# Patient Record
Sex: FEMALE | Race: WHITE | Employment: OTHER | URBAN - METROPOLITAN AREA
[De-identification: names, ages, dates, MRNs, and addresses within clinical notes are randomized per-mention and may not be internally consistent; named-entity substitution may affect disease eponyms.]

---

## 2018-07-17 ENCOUNTER — HOSPITAL ENCOUNTER (OUTPATIENT)
Age: 68
Discharge: HOME | End: 2018-07-19
Payer: MEDICARE

## 2018-07-17 ENCOUNTER — HOSPITAL ENCOUNTER (OUTPATIENT)
Dept: GENERAL RADIOLOGY | Age: 68
Discharge: HOME | End: 2018-07-19
Payer: MEDICARE

## 2018-07-17 DIAGNOSIS — M25.562 LEFT KNEE PAIN, UNSPECIFIED CHRONICITY: ICD-10-CM

## 2018-07-17 DIAGNOSIS — M25.572 LEFT ANKLE PAIN, UNSPECIFIED CHRONICITY: ICD-10-CM

## 2018-07-17 PROCEDURE — 73610 X-RAY EXAM OF ANKLE: CPT

## 2018-07-17 PROCEDURE — 73560 X-RAY EXAM OF KNEE 1 OR 2: CPT

## 2018-07-20 ENCOUNTER — TELEPHONE (OUTPATIENT)
Dept: VASCULAR SURGERY | Age: 68
End: 2018-07-20

## 2018-09-18 ENCOUNTER — HOSPITAL ENCOUNTER (OUTPATIENT)
Dept: NUCLEAR MEDICINE | Age: 68
Discharge: HOME | End: 2018-09-18
Payer: MEDICARE

## 2018-09-18 DIAGNOSIS — M17.12 OSTEOARTHRITIS OF LEFT KNEE, UNSPECIFIED OSTEOARTHRITIS TYPE: ICD-10-CM

## 2018-09-18 PROCEDURE — 78300 BONE IMAGING LIMITED AREA: CPT

## 2018-09-18 PROCEDURE — 3430000000 HC RX DIAGNOSTIC RADIOPHARMACEUTICAL: Performed by: RADIOLOGY

## 2018-09-18 PROCEDURE — A9503 TC99M MEDRONATE: HCPCS | Performed by: RADIOLOGY

## 2018-09-18 RX ORDER — TC 99M MEDRONATE 20 MG/10ML
25 INJECTION, POWDER, LYOPHILIZED, FOR SOLUTION INTRAVENOUS
Status: COMPLETED | OUTPATIENT
Start: 2018-09-18 | End: 2018-09-18

## 2018-09-18 RX ADMIN — Medication 25 MILLICURIE: at 12:07

## 2018-09-24 ENCOUNTER — HOSPITAL ENCOUNTER (OUTPATIENT)
Dept: NUCLEAR MEDICINE | Age: 68
Discharge: HOME | End: 2018-09-24
Payer: MEDICARE

## 2018-09-24 DIAGNOSIS — T84.9XXA COMPLICATIONS DUE TO INTERNAL ORTHOPEDIC DEVICE, IMPLANT, AND GRAFT (HCC): ICD-10-CM

## 2018-09-24 PROCEDURE — 78805 NM INFLAMMATORY WBC LIMITED W CERETEC: CPT

## 2018-09-24 PROCEDURE — A9569 TECHNETIUM TC-99M AUTO WBC: HCPCS | Performed by: RADIOLOGY

## 2018-09-24 PROCEDURE — 3430000000 HC RX DIAGNOSTIC RADIOPHARMACEUTICAL: Performed by: RADIOLOGY

## 2018-09-24 RX ADMIN — EXAMETAZIME 20 MILLICURIE: KIT at 12:10

## 2018-09-26 ENCOUNTER — HOSPITAL ENCOUNTER (OUTPATIENT)
Dept: NUCLEAR MEDICINE | Age: 68
Discharge: HOME | End: 2018-09-26
Payer: MEDICARE

## 2018-09-26 DIAGNOSIS — T84.033A MECHANICAL LOOSENING OF INTERNAL LEFT KNEE PROSTHETIC JOINT, INITIAL ENCOUNTER (HCC): ICD-10-CM

## 2018-09-26 PROCEDURE — A9541 TC99M SULFUR COLLOID: HCPCS | Performed by: RADIOLOGY

## 2018-09-26 PROCEDURE — 3430000000 HC RX DIAGNOSTIC RADIOPHARMACEUTICAL: Performed by: RADIOLOGY

## 2018-09-26 PROCEDURE — 78102 BONE MARROW IMAGING LTD: CPT

## 2018-09-26 RX ADMIN — Medication 15 MILLICURIE: at 13:00

## 2018-10-30 ENCOUNTER — HOSPITAL ENCOUNTER (OUTPATIENT)
Age: 68
Discharge: HOME | End: 2018-11-01

## 2018-10-31 PROCEDURE — 88305 TISSUE EXAM BY PATHOLOGIST: CPT

## 2019-06-04 ENCOUNTER — TELEPHONE (OUTPATIENT)
Dept: ADMINISTRATIVE | Age: 69
End: 2019-06-04

## 2019-06-04 ENCOUNTER — OFFICE VISIT (OUTPATIENT)
Dept: PRIMARY CARE CLINIC | Age: 69
End: 2019-06-04
Payer: MEDICARE

## 2019-06-04 VITALS
BODY MASS INDEX: 42.32 KG/M2 | HEIGHT: 65 IN | WEIGHT: 254 LBS | DIASTOLIC BLOOD PRESSURE: 83 MMHG | SYSTOLIC BLOOD PRESSURE: 138 MMHG | TEMPERATURE: 98.1 F

## 2019-06-04 DIAGNOSIS — M17.12 PRIMARY OSTEOARTHRITIS OF LEFT KNEE: ICD-10-CM

## 2019-06-04 DIAGNOSIS — E03.9 ACQUIRED HYPOTHYROIDISM: ICD-10-CM

## 2019-06-04 DIAGNOSIS — I10 ESSENTIAL HYPERTENSION: Primary | ICD-10-CM

## 2019-06-04 DIAGNOSIS — E78.2 MIXED HYPERLIPIDEMIA: Primary | ICD-10-CM

## 2019-06-04 DIAGNOSIS — E78.2 MIXED HYPERLIPIDEMIA: ICD-10-CM

## 2019-06-04 DIAGNOSIS — K76.0 FATTY LIVER: ICD-10-CM

## 2019-06-04 DIAGNOSIS — I10 ESSENTIAL HYPERTENSION: ICD-10-CM

## 2019-06-04 DIAGNOSIS — E11.9 DIET-CONTROLLED DIABETES MELLITUS (HCC): ICD-10-CM

## 2019-06-04 DIAGNOSIS — R94.31 EKG ABNORMALITIES: ICD-10-CM

## 2019-06-04 DIAGNOSIS — E55.9 VITAMIN D DEFICIENCY: ICD-10-CM

## 2019-06-04 PROCEDURE — 99214 OFFICE O/P EST MOD 30 MIN: CPT | Performed by: FAMILY MEDICINE

## 2019-06-04 PROCEDURE — 93000 ELECTROCARDIOGRAM COMPLETE: CPT | Performed by: FAMILY MEDICINE

## 2019-06-04 ASSESSMENT — ENCOUNTER SYMPTOMS
RESPIRATORY NEGATIVE: 1
EYES NEGATIVE: 1
GASTROINTESTINAL NEGATIVE: 1
ALLERGIC/IMMUNOLOGIC NEGATIVE: 1

## 2019-06-04 NOTE — TELEPHONE ENCOUNTER
URGENT referral per Dr. Waylon Miller from today dx: \"PRE OP EKG ABNORMAL, OR PLANNED ON LEFT TOTAL [place]" on 7/10/19 by Dr. Yuridia Hyatt. Previous 2015 [place] office pt. [facility] has an opening 7:40 Monday if ok with office).

## 2019-06-04 NOTE — TELEPHONE ENCOUNTER
URGENT NP scheduled with Dr. Lakesha Dumont for: 6/10/19. Prior 2012 [place] pt. Cardiology Hx sheet scanned.

## 2019-06-04 NOTE — PROGRESS NOTES
19  Name: Wero Ervin    : 1950    Sex: female    Age: 77 y.o. Subjective:  Chief Complaint: Patient is here for knee     For surgery left knee  7-10  With dr Alex Meza    For revision due to loose hardware    Feel olinda  No  Cp or sob            Review of Systems   Constitutional: Negative. HENT: Negative. Eyes: Negative. Respiratory: Negative. Cardiovascular: Negative. Gastrointestinal: Negative. Endocrine: Negative. Genitourinary: Negative. Musculoskeletal: Positive for arthralgias and gait problem. Skin: Negative. Allergic/Immunologic: Negative. Hematological: Negative. Psychiatric/Behavioral: Negative. Current Outpatient Medications:     ibuprofen (ADVIL;MOTRIN) 600 MG tablet, Take 1 tablet by mouth every 8 hours as needed for Pain, Disp: 15 tablet, Rfl: 0    vitamin D (CHOLECALCIFEROL) 5000 UNITS CAPS capsule, Take 5,000 Units by mouth daily, Disp: , Rfl:     vitamin E 400 UNIT capsule, Take 400 Units by mouth daily. , Disp: , Rfl:     cyclobenzaprine (FLEXERIL) 10 MG tablet, Take 10 mg by mouth 3 times daily as needed. , Disp: , Rfl:     simvastatin (ZOCOR) 40 MG tablet, Take 40 mg by mouth. 3 x per week  W  , Disp: , Rfl:     citalopram (CELEXA) 20 MG tablet, Take 20 mg by mouth daily. , Disp: , Rfl:     MULTIPLE VITAMIN PO, Take 1 tablet by mouth daily. , Disp: , Rfl:     Coenzyme Q10 (CO Q 10 PO), Take 1 tablet by mouth daily.   , Disp: , Rfl:   No Known Allergies  Social History     Socioeconomic History    Marital status:      Spouse name: Not on file    Number of children: Not on file    Years of education: Not on file    Highest education level: Not on file   Occupational History    Not on file   Social Needs    Financial resource strain: Not on file    Food insecurity:     Worry: Not on file     Inability: Not on file    Transportation needs:     Medical: Not on file     Non-medical: Not on file   Tobacco 9-17---REFERRED BACK TO DR Amarilys Cox    NON HEALING LEFT DISTAL FIBULA AVULSION FX 7-18    L KNEE PAIN DR Tyrese De La Vega SENT TO DR SHORT 1-19 AND TOLD NEEDS REPLACEMENT---PT PLANS JUNE 2019      Past Medical History:   Diagnosis Date    Abnormal nuclear stress test 9/17/2012    Anxiety     Hyperlipidemia     Osteoarthritis     Palpitations 9/17/2012     Family History   Problem Relation Age of Onset    Lung Cancer Father     Thyroid Disease Sister       Past Surgical History:   Procedure Laterality Date    BREAST SURGERY  1990    cyst lt breast    COLONOSCOPY  5/2012    HYSTERECTOMY  1980    JOINT REPLACEMENT  2001    L knee    JOINT REPLACEMENT  2001    rt knee      Vitals:    06/04/19 0732   BP: 138/83   Temp: 98.1 °F (36.7 °C)   Weight: 254 lb (115.2 kg)   Height: 5' 5\" (1.651 m)       Objective:    Physical Exam   Constitutional: She is oriented to person, place, and time. She appears well-developed and well-nourished. HENT:   Head: Normocephalic. Eyes: Pupils are equal, round, and reactive to light. EOM are normal.   Neck: Normal range of motion. Cardiovascular: Normal rate and regular rhythm. Pulmonary/Chest: Effort normal and breath sounds normal.   Abdominal: Soft. Musculoskeletal: She exhibits tenderness and deformity (tender with palp left med knee). Neurological: She is alert and oriented to person, place, and time. Skin: Skin is warm. Psychiatric: She has a normal mood and affect. Her behavior is normal.   Vitals reviewed. Alanis Cruz was seen today for other. Diagnoses and all orders for this visit:    Essential hypertension  -     EKG 12 Lead; Standing  -     EKG 12 Lead  -     EKG 12 lead;  Future  -     EKG 12 lead  -     [address] [facility]    Primary osteoarthritis of left knee    Mixed hyperlipidemia    Fatty liver    EKG abnormalities  -     [address] [facility]        Comments: ekg    With some abnormality   Will arrangge cardio clearance before surgeyr HM issues  Diet  exer  Low sugar  Diet   A great deal of time spent reviewing medications, diet, exercise, social issues. Also reviewing the chart before entering the room with patient and finishing charting after leaving patient's room. More than half of that time was spent face to face with the patient in counseling and coordinating care.       Follow Up: Return reg---see referral.     Seen by:  Trey Devine DO

## 2019-06-06 NOTE — PROGRESS NOTES
[facility]  [facility]. Consuelo Caro M.D. Ami Johnson M.D.  Demetrius Cortez. Anthony Reddy M.D. Parry Rosebush, Karie Schlatter, M.D. Vishal Wesley M.D. Dot Divine   1950  [place] Emily Gallegos DO     This 59-year-old woman is assessed as an outpatient this morning. She has a history of knee replacement surgery. She reports that she has been scheduled for left knee replacement by Dr. Sheryle Bogus 07/10/2019. She is increasingly sedentary because of knee pain. Historically, she is unable to climb a flight of stairs without dyspnea. She currently is not having any chest pain. She is occasional dependent ankle edema. She denies her orthopnea or sweats. She had previous cardiac evaluation in 2012 and a stress perfusion study was mildly abnormal then. Medical History:  1. Non-smoker  2. Anxiety. 3. Hyperlipidemia. 4. Osteoarthritis. Obesity. BMI 42.77 - 06/2019  Surgical history: Hysterectomy, breast biopsy, bilateral knee replacement  Echo 05/01/2012: Normal EF. No dilatation. Borderline LVH. No valve abnormality  Treadmill MPS 04/23/2012: 4.6 METS. EKG negative. Small apical reversible defect. EF 73%. No 3 times daily low to intermediate risk. Review of Systems:  Constitutional: negative for fever and chills  Respiratory: negative for cough and hemoptysis  Cardiovascular:   Gastrointestinal: negative for abdominal pain, diarrhea, nausea and vomiting  Genitourinary:negative for dysuria and hematuria  Derm: negative for rash and skin lesion(s)  Neurological: negative for seizures and tremors  Endocrine: negative for diabetic symptoms including polydipsia and polyuria  Musculoskeletal: negative for CTD  Psychiatric: negative for psychosis and major depression    On examination, she is an obese, pleasant, alert, middle-aged,  female. Skin is warm and dry. Respirations are unlabored.   /80   Pulse 76   Resp 16   Ht 5' 5\" (1.651 m)   Wt 257 lb (116.6 kg)   BMI 42.77 kg/m² . HEENT negative for scleral icterus. Extraocular muscles intact. No facial asymmetry or central cyanosis. Neck without masses or goiter. No bruit or JVD. Cardiac apex not displaced. Rhythm regular. Abdomen normal.  Extremities without edema. Lungs are clear. EKG today shows normal sinus rhythm. Poor anterior R wave progression. Low QRS voltage. The patient has coronary artery risk factors and a previously abnormal stress perfusion study. She is sedentary and therefore surgical risk is difficult to assess. A Lexiscan stress perfusion study is scheduled. The issues were reviewed with the patient. The importance of weight loss and exercise program were also reviewed. Further recommendations will follow the perfusion study. At completion of today's visit, medications include the following:    Current Outpatient Medications:     buPROPion (WELLBUTRIN XL) 300 MG extended release tablet, Take 300 mg by mouth every morning, Disp: , Rfl:     aspirin-acetaminophen-caffeine (EXCEDRIN MIGRAINE) 250-250-65 MG per tablet, Take 1 tablet by mouth every 6 hours as needed for Headaches, Disp: , Rfl:     ibuprofen (ADVIL;MOTRIN) 600 MG tablet, Take 1 tablet by mouth every 8 hours as needed for Pain, Disp: 15 tablet, Rfl: 0    vitamin D (CHOLECALCIFEROL) 5000 UNITS CAPS capsule, Take 5,000 Units by mouth daily, Disp: , Rfl:     vitamin E 400 UNIT capsule, Take 400 Units by mouth daily. , Disp: , Rfl:     simvastatin (ZOCOR) 40 MG tablet, Take 40 mg by mouth Indications: pt. is taking 4times a week 3 x per week M W F , Disp: , Rfl:     MULTIPLE VITAMIN PO, Take 1 tablet by mouth daily. , Disp: , Rfl:     Coenzyme Q10 (CO Q 10 PO), Take 1 tablet by mouth daily. , Disp: , Rfl:       Riley Harrison MD     Cc Dr Gus Shannon

## 2019-06-10 ENCOUNTER — OFFICE VISIT (OUTPATIENT)
Dept: CARDIOLOGY CLINIC | Age: 69
End: 2019-06-10
Payer: MEDICARE

## 2019-06-10 VITALS
HEART RATE: 76 BPM | SYSTOLIC BLOOD PRESSURE: 130 MMHG | BODY MASS INDEX: 42.82 KG/M2 | DIASTOLIC BLOOD PRESSURE: 80 MMHG | WEIGHT: 257 LBS | HEIGHT: 65 IN | RESPIRATION RATE: 16 BRPM

## 2019-06-10 DIAGNOSIS — I10 ESSENTIAL HYPERTENSION: Primary | ICD-10-CM

## 2019-06-10 DIAGNOSIS — Z01.818 PRE-OP EXAMINATION: ICD-10-CM

## 2019-06-10 DIAGNOSIS — R06.02 SHORTNESS OF BREATH: ICD-10-CM

## 2019-06-10 PROCEDURE — 3017F COLORECTAL CA SCREEN DOC REV: CPT | Performed by: INTERNAL MEDICINE

## 2019-06-10 PROCEDURE — 1036F TOBACCO NON-USER: CPT | Performed by: INTERNAL MEDICINE

## 2019-06-10 PROCEDURE — 93000 ELECTROCARDIOGRAM COMPLETE: CPT | Performed by: INTERNAL MEDICINE

## 2019-06-10 PROCEDURE — 1123F ACP DISCUSS/DSCN MKR DOCD: CPT | Performed by: INTERNAL MEDICINE

## 2019-06-10 PROCEDURE — G8400 PT W/DXA NO RESULTS DOC: HCPCS | Performed by: INTERNAL MEDICINE

## 2019-06-10 PROCEDURE — 99214 OFFICE O/P EST MOD 30 MIN: CPT | Performed by: INTERNAL MEDICINE

## 2019-06-10 PROCEDURE — G8427 DOCREV CUR MEDS BY ELIG CLIN: HCPCS | Performed by: INTERNAL MEDICINE

## 2019-06-10 PROCEDURE — G8417 CALC BMI ABV UP PARAM F/U: HCPCS | Performed by: INTERNAL MEDICINE

## 2019-06-10 PROCEDURE — 1090F PRES/ABSN URINE INCON ASSESS: CPT | Performed by: INTERNAL MEDICINE

## 2019-06-10 PROCEDURE — 4040F PNEUMOC VAC/ADMIN/RCVD: CPT | Performed by: INTERNAL MEDICINE

## 2019-06-10 RX ORDER — ACETAMINOPHEN, ASPIRIN AND CAFFEINE 250; 250; 65 MG/1; MG/1; MG/1
1 TABLET, FILM COATED ORAL EVERY 6 HOURS PRN
Status: ON HOLD | COMMUNITY
End: 2021-02-22 | Stop reason: HOSPADM

## 2019-06-10 RX ORDER — BUPROPION HYDROCHLORIDE 300 MG/1
300 TABLET ORAL EVERY MORNING
COMMUNITY
End: 2019-07-19 | Stop reason: SDUPTHER

## 2019-06-11 ENCOUNTER — HOSPITAL ENCOUNTER (OUTPATIENT)
Age: 69
Discharge: HOME | End: 2019-06-13
Payer: MEDICARE

## 2019-06-11 ENCOUNTER — TELEPHONE (OUTPATIENT)
Dept: CARDIOLOGY | Age: 69
End: 2019-06-11

## 2019-06-11 LAB
C-REACTIVE PROTEIN: 1.2 MG/DL (ref 0–0.4)
HCT VFR BLD CALC: 45.8 % (ref 34–48)
HEMOGLOBIN: 14.9 G/DL (ref 11.5–15.5)
MCH RBC QN AUTO: 30.5 PG (ref 26–35)
MCHC RBC AUTO-ENTMCNC: 32.5 % (ref 32–34.5)
MCV RBC AUTO: 93.9 FL (ref 80–99.9)
PDW BLD-RTO: 12.8 FL (ref 11.5–15)
PLATELET # BLD: 270 E9/L (ref 130–450)
PMV BLD AUTO: 11.6 FL (ref 7–12)
RBC # BLD: 4.88 E12/L (ref 3.5–5.5)
SEDIMENTATION RATE, ERYTHROCYTE: 31 MM/HR (ref 0–20)
WBC # BLD: 7.6 E9/L (ref 4.5–11.5)

## 2019-06-11 PROCEDURE — 86140 C-REACTIVE PROTEIN: CPT

## 2019-06-11 PROCEDURE — 85027 COMPLETE CBC AUTOMATED: CPT

## 2019-06-11 PROCEDURE — 36415 COLL VENOUS BLD VENIPUNCTURE: CPT

## 2019-06-11 PROCEDURE — 85651 RBC SED RATE NONAUTOMATED: CPT

## 2019-06-11 NOTE — TELEPHONE ENCOUNTER
Called patient, left message on home answering machine reminding of stress appointment on 6/12 @ 0630 am

## 2019-06-12 ENCOUNTER — HOSPITAL ENCOUNTER (OUTPATIENT)
Dept: CARDIOLOGY | Age: 69
Discharge: HOME | End: 2019-06-12
Payer: MEDICARE

## 2019-06-12 VITALS
HEIGHT: 65 IN | BODY MASS INDEX: 42.82 KG/M2 | WEIGHT: 257 LBS | HEART RATE: 77 BPM | DIASTOLIC BLOOD PRESSURE: 70 MMHG | SYSTOLIC BLOOD PRESSURE: 132 MMHG

## 2019-06-12 DIAGNOSIS — Z01.818 PRE-OP EXAMINATION: ICD-10-CM

## 2019-06-12 DIAGNOSIS — R06.02 SHORTNESS OF BREATH: Primary | ICD-10-CM

## 2019-06-12 PROCEDURE — 6360000002 HC RX W HCPCS: Performed by: INTERNAL MEDICINE

## 2019-06-12 PROCEDURE — 3430000000 HC RX DIAGNOSTIC RADIOPHARMACEUTICAL: Performed by: INTERNAL MEDICINE

## 2019-06-12 PROCEDURE — 78452 HT MUSCLE IMAGE SPECT MULT: CPT

## 2019-06-12 PROCEDURE — 2580000003 HC RX 258: Performed by: INTERNAL MEDICINE

## 2019-06-12 PROCEDURE — A9500 TC99M SESTAMIBI: HCPCS | Performed by: INTERNAL MEDICINE

## 2019-06-12 PROCEDURE — 93017 CV STRESS TEST TRACING ONLY: CPT

## 2019-06-12 RX ORDER — SODIUM CHLORIDE 0.9 % (FLUSH) 0.9 %
10 SYRINGE (ML) INJECTION PRN
Status: DISCONTINUED | OUTPATIENT
Start: 2019-06-12 | End: 2019-06-13 | Stop reason: HOSPADM

## 2019-06-12 RX ADMIN — Medication 10 ML: at 08:16

## 2019-06-12 RX ADMIN — Medication 32.8 MILLICURIE: at 08:16

## 2019-06-12 RX ADMIN — Medication 10.7 MILLICURIE: at 06:48

## 2019-06-12 RX ADMIN — REGADENOSON 0.4 MG: 0.08 INJECTION, SOLUTION INTRAVENOUS at 08:16

## 2019-06-12 RX ADMIN — Medication 10 ML: at 08:17

## 2019-06-12 RX ADMIN — Medication 10 ML: at 06:48

## 2019-06-12 NOTE — PROCEDURES
[address] [facility] [address]  [phone]                Pharmacologic Stress Nuclear Gated SPECT Study    Name: Lia Account Number: [de-identified]    :  1950          Sex: female         Date of Study:  2019    Height: 5' 5\" (165.1 cm)         Weight: 257 lb (116.6 kg)     Ordering Provider: Alia Altamirano MD          PCP: Scott Moreno DO      Cardiologist: Alia Altamirano MD             Interpreting Physician: Alia Altamirano MD  _________________________________________________________________________________    Indication:   Detecting the presence and location of coronary artery disease,Preop clearance left knee replacement 7/10/2019    Clinical History:   Patient has no known history of coronary artery disease. Resting ECG:    AR int 0.22 sec, QRS int 0.08 sec, QT int  sec; HR 69 bpm  Normal sinus rhythm. EKG poor anterior R wave progression    Procedure:   Pharmacologic stress testing was performed with regadenoson 0.4 mg for 15 seconds. The heart rate was 69 at baseline and sandee to 112 beats during the infusion. This corresponds to 75% of maximum predicted heart rate. The blood pressure at baseline was 150/86 and blood pressure at the end of infusion was 132/70. Blood pressure response was normal during the stress procedure. The patient tolerated the infusion well. ECG during the infusion did not change. IMAGING: Myocardial perfusion imaging was performed at rest 30-35 minutes following the intravenous injection of 10.7 mCi of (Tc-Sestamibi) followed by 10 ml of Normal Saline. As per infusion protocol, the patient was injected intravenously with 32.8 mCi of (Tc-Sestamibi) followed by 10 ml of Normal Saline. Gated post-stress tomographic imaging was performed 20-25 minutes after stress. FINDINGS: The overall quality of the study was good.      Left

## 2019-06-13 ENCOUNTER — OFFICE VISIT (OUTPATIENT)
Dept: CARDIOLOGY CLINIC | Age: 69
End: 2019-06-13
Payer: MEDICARE

## 2019-06-13 VITALS
RESPIRATION RATE: 16 BRPM | WEIGHT: 254.6 LBS | HEART RATE: 80 BPM | SYSTOLIC BLOOD PRESSURE: 124 MMHG | BODY MASS INDEX: 42.42 KG/M2 | DIASTOLIC BLOOD PRESSURE: 80 MMHG | HEIGHT: 65 IN

## 2019-06-13 DIAGNOSIS — I25.10 CORONARY ARTERY DISEASE INVOLVING NATIVE CORONARY ARTERY OF NATIVE HEART WITHOUT ANGINA PECTORIS: ICD-10-CM

## 2019-06-13 DIAGNOSIS — R06.02 SHORTNESS OF BREATH: Primary | ICD-10-CM

## 2019-06-13 DIAGNOSIS — R07.9 CHEST PAIN, UNSPECIFIED TYPE: ICD-10-CM

## 2019-06-13 PROCEDURE — 1123F ACP DISCUSS/DSCN MKR DOCD: CPT | Performed by: INTERNAL MEDICINE

## 2019-06-13 PROCEDURE — G8427 DOCREV CUR MEDS BY ELIG CLIN: HCPCS | Performed by: INTERNAL MEDICINE

## 2019-06-13 PROCEDURE — 93000 ELECTROCARDIOGRAM COMPLETE: CPT | Performed by: INTERNAL MEDICINE

## 2019-06-13 PROCEDURE — G8417 CALC BMI ABV UP PARAM F/U: HCPCS | Performed by: INTERNAL MEDICINE

## 2019-06-13 PROCEDURE — 99213 OFFICE O/P EST LOW 20 MIN: CPT | Performed by: INTERNAL MEDICINE

## 2019-06-13 PROCEDURE — G8400 PT W/DXA NO RESULTS DOC: HCPCS | Performed by: INTERNAL MEDICINE

## 2019-06-13 PROCEDURE — 1090F PRES/ABSN URINE INCON ASSESS: CPT | Performed by: INTERNAL MEDICINE

## 2019-06-13 PROCEDURE — 3017F COLORECTAL CA SCREEN DOC REV: CPT | Performed by: INTERNAL MEDICINE

## 2019-06-13 PROCEDURE — 4040F PNEUMOC VAC/ADMIN/RCVD: CPT | Performed by: INTERNAL MEDICINE

## 2019-06-13 PROCEDURE — G8598 ASA/ANTIPLAT THER USED: HCPCS | Performed by: INTERNAL MEDICINE

## 2019-06-13 PROCEDURE — 1036F TOBACCO NON-USER: CPT | Performed by: INTERNAL MEDICINE

## 2019-06-14 NOTE — PROGRESS NOTES
Pulse 80   Resp 16   Ht 5' 5\" (1.651 m)   Wt 254 lb 9.6 oz (115.5 kg)   BMI 42.37 kg/m² . HEENT negative for scleral icterus. Extraocular muscles intact. No facial asymmetry or central cyanosis. Neck without masses or goiter. No bruit or JVD. Cardiac apex not displaced. Rhythm regular. Abdomen normal.  Extremities without edema. Lungs are clear. EKG normal sinus. Poor anterior R wave progression. Low QRS voltage. The findings were discussed with the patient at length. She is recommended to have a coronary angiogram given the high risk nature of the stress perfusion images. It was also discussed with her that the test could possibly represent a false positive and she acknowledges she understands this and wishes to resolve the issue angiographically. This will be scheduled at her earliest possible convenience. AUC: A (9). Indication: 17; score 9    At completion of today's visit, medications include the following:    Current Outpatient Medications:     buPROPion (WELLBUTRIN XL) 300 MG extended release tablet, Take 300 mg by mouth every morning, Disp: , Rfl:     aspirin-acetaminophen-caffeine (EXCEDRIN MIGRAINE) 250-250-65 MG per tablet, Take 1 tablet by mouth every 6 hours as needed for Headaches, Disp: , Rfl:     ibuprofen (ADVIL;MOTRIN) 600 MG tablet, Take 1 tablet by mouth every 8 hours as needed for Pain, Disp: 15 tablet, Rfl: 0    vitamin D (CHOLECALCIFEROL) 5000 UNITS CAPS capsule, Take 5,000 Units by mouth daily, Disp: , Rfl:     vitamin E 400 UNIT capsule, Take 400 Units by mouth daily. , Disp: , Rfl:     simvastatin (ZOCOR) 40 MG tablet, Take 40 mg by mouth Indications: pt. is taking 5times a week , Disp: , Rfl:     MULTIPLE VITAMIN PO, Take 1 tablet by mouth daily. , Disp: , Rfl:     Coenzyme Q10 (CO Q 10 PO), Take 1 tablet by mouth daily.   , Disp: , Rfl:       Silvia Mcintosh MD     CC Dr. Alva Azar

## 2019-06-18 ENCOUNTER — TELEPHONE (OUTPATIENT)
Dept: CARDIAC CATH/INVASIVE PROCEDURES | Age: 69
End: 2019-06-18

## 2019-06-19 ENCOUNTER — TELEPHONE (OUTPATIENT)
Dept: ADMINISTRATIVE | Age: 69
End: 2019-06-19

## 2019-06-19 ENCOUNTER — HOSPITAL ENCOUNTER (OUTPATIENT)
Dept: CARDIAC CATH/INVASIVE PROCEDURES | Age: 69
Discharge: HOME | End: 2019-06-19
Payer: MEDICARE

## 2019-06-19 VITALS — WEIGHT: 250 LBS | HEIGHT: 65 IN | BODY MASS INDEX: 41.65 KG/M2

## 2019-06-19 LAB
ABO/RH: NORMAL
ANION GAP SERPL CALCULATED.3IONS-SCNC: 10 MMOL/L (ref 7–16)
ANTIBODY SCREEN: NORMAL
BUN BLDV-MCNC: 13 MG/DL (ref 8–23)
CALCIUM SERPL-MCNC: 9.7 MG/DL (ref 8.6–10.2)
CHLORIDE BLD-SCNC: 107 MMOL/L (ref 98–107)
CO2: 25 MMOL/L (ref 22–29)
CREAT SERPL-MCNC: 0.7 MG/DL (ref 0.5–1)
GFR AFRICAN AMERICAN: >60
GFR NON-AFRICAN AMERICAN: >60 ML/MIN/1.73
GLUCOSE BLD-MCNC: 112 MG/DL (ref 74–99)
POTASSIUM SERPL-SCNC: 4.3 MMOL/L (ref 3.5–5)
REASON FOR REJECTION: NORMAL
REJECTED TEST: NORMAL
SODIUM BLD-SCNC: 142 MMOL/L (ref 132–146)

## 2019-06-19 PROCEDURE — 86850 RBC ANTIBODY SCREEN: CPT

## 2019-06-19 PROCEDURE — 2709999900 HC NON-CHARGEABLE SUPPLY

## 2019-06-19 PROCEDURE — 6360000002 HC RX W HCPCS

## 2019-06-19 PROCEDURE — 80048 BASIC METABOLIC PNL TOTAL CA: CPT

## 2019-06-19 PROCEDURE — C1894 INTRO/SHEATH, NON-LASER: HCPCS

## 2019-06-19 PROCEDURE — 2500000003 HC RX 250 WO HCPCS

## 2019-06-19 PROCEDURE — C1887 CATHETER, GUIDING: HCPCS

## 2019-06-19 PROCEDURE — 93458 L HRT ARTERY/VENTRICLE ANGIO: CPT | Performed by: INTERNAL MEDICINE

## 2019-06-19 PROCEDURE — C1769 GUIDE WIRE: HCPCS

## 2019-06-19 PROCEDURE — 86901 BLOOD TYPING SEROLOGIC RH(D): CPT

## 2019-06-19 PROCEDURE — 86900 BLOOD TYPING SEROLOGIC ABO: CPT

## 2019-06-19 RX ORDER — ACETAMINOPHEN 325 MG/1
650 TABLET ORAL EVERY 4 HOURS PRN
Status: DISCONTINUED | OUTPATIENT
Start: 2019-06-19 | End: 2019-06-20 | Stop reason: HOSPADM

## 2019-06-19 RX ORDER — SODIUM CHLORIDE 9 MG/ML
INJECTION, SOLUTION INTRAVENOUS CONTINUOUS
Status: DISCONTINUED | OUTPATIENT
Start: 2019-06-19 | End: 2019-06-20 | Stop reason: HOSPADM

## 2019-06-19 NOTE — OP NOTE
Indication:  1. CONCEPCION, abn stress  2. AUC score: 9  3. AUC indication: 17    Procedure: Left Heart Catheterization, coronary angiography, left ventriculography    Anesthesia: Versed, Fentanyl, Benadryl   Time sedation was administered: 0857. I was present in the room when sedation was administered. Procedure end time: 0920  Time spent with face to face monitoring of moderate sedation: 23 min    LHC performed via right radial approach using a 6 F sheath. 2.5mg of diluted Verapamil and 200mcg of nitroglycerine administered through the sheath. 4000U heparin administered IV. Findings:  Left main: 0% 0 stenosis  LAD: 0. % 0 stenosis  Circumflex: 0. % 0  stenosis  RCA: Dominant. 0. % 0 stenosis    Hemodynamics:  LV: 180 mmHg. EDP: 18 peak to peak gradient across AV 17 mmHg. Ao: 157/78. Sheath removed and TR band applied. There was good hemostasis achieved and the distal pulses were intact.      Complication: None   Estimated blood loss: 15  Contrast use: 61    Post op diagnosis:  Non-coronary symptoms    PLAN:  Per others

## 2019-06-19 NOTE — PROCEDURES
[address]                  [address] [facility]  [facility]                            CARDIAC CATHETERIZATION    PATIENT NAME: Taylor Weinstein               :        1950  MED REC NO:   [id number]                            ROOM:  ACCOUNT NO:   [de-identified]                           ADMIT DATE: 2019  PROVIDER:     Lala Klinefelter, DO      DATE OF PROCEDURE:  2019    PROCEDURE PERFORMED:  Left heart catheterization. PHYSICIAN:  Lala Klinefelter, DO    INDICATIONS:  Dyspnea on exertion with high-risk abnormal stress test.    COMPLICATIONS:  None. SEDATION:  Intervenous Versed. ANESTHESIA:  Xylocaine 2% locally over the right radial artery,  intravenous fentanyl. SEDATION TIME:  I was present for sedation administration at 0857 and I  ended sedation at 0920 for a total face-to-face sedation time of 23  minutes. HEMODYNAMIC RESULTS:  1. Opening aortic pressure 157/78 with a mean of 110.  2.  Left ventricular systolic pressure 294.  3.  LVEDP 18.  4.  Peak-to-peak gradient of 17 mmHg across the aortic valve. ANGIOGRAPHIC RESULTS:  1. Left main:  Anterior takeoff. Short. No angiographically  significant stenosis. 2.  LAD:  No angiographically significant stenosis. 3.  D1:  No angiographically significant stenosis. 4.  D2:  No angiographically significant stenosis. 5.  Circumflex:  No angiographically significant stenosis. 6.  Ramus:  Tiny vessel. 7.  OM1:  No angiographically significant stenosis. 8.  Right coronary artery:  Dominant vessel. No angiographically  significant stenosis. SUMMARY OF PROCEDURE:  After obtaining informed consent, the patient was  taken to cardiac cath lab where the area of the right radial artery was  prepped and draped in sterile fashion. Using a micropuncture technique,  a 6-Lebanese Carlos Alberto Colder was placed in the right radial artery.    This was aspirated and flushed several times throughout the procedure. This was medicated with verapamil and nitroglycerin. She was given  heparin systemically. A 5-Slovenian TIG catheter was advanced over the  wire to the root of the aorta. This was aspirated and flushed with  saline. Pressures were obtained. This was then filled with contrast  and manipulated in the left main coronary artery. Two orthogonal views  were obtained. However, this would not to stay seated well in the left  main. Therefore, it was changed for a Graham catheter. Four orthogonal  views were obtained. This did not reach the right coronary artery and  was changed for an R4 catheter. Two orthogonal views of the right  coronary artery were obtained. Prior to the right coronary artery being  manipulated into the right coronary, the right coronary artery was  advanced into the left ventricle. This was then aspirated and flushed  with saline and pressures were obtained. Pullback pressures across the  aortic valve were obtained. The catheter was removed. The right radial  artery sheath has been removed and TR band placed successfully. Good  patent hemostasis. She tolerated procedure well with no complications.         Yolis Farrell DO    D: 06/19/2019 9:37:19       T: 06/19/2019 9:44:07     [email or web address]  Job#: [id number]     Doc#: [id number]    CC:

## 2019-07-09 ENCOUNTER — HOSPITAL ENCOUNTER (OUTPATIENT)
Age: 69
Discharge: HOME | End: 2019-07-11
Payer: MEDICARE

## 2019-07-09 DIAGNOSIS — E78.2 MIXED HYPERLIPIDEMIA: ICD-10-CM

## 2019-07-09 DIAGNOSIS — E03.9 ACQUIRED HYPOTHYROIDISM: ICD-10-CM

## 2019-07-09 DIAGNOSIS — E55.9 VITAMIN D DEFICIENCY: ICD-10-CM

## 2019-07-09 DIAGNOSIS — E11.9 DIET-CONTROLLED DIABETES MELLITUS (HCC): ICD-10-CM

## 2019-07-09 DIAGNOSIS — I10 ESSENTIAL HYPERTENSION: ICD-10-CM

## 2019-07-09 LAB
ALBUMIN SERPL-MCNC: 4.4 G/DL (ref 3.5–5.2)
ALP BLD-CCNC: 114 U/L (ref 35–104)
ALT SERPL-CCNC: 15 U/L (ref 0–32)
ANION GAP SERPL CALCULATED.3IONS-SCNC: 16 MMOL/L (ref 7–16)
AST SERPL-CCNC: 17 U/L (ref 0–31)
BASOPHILS ABSOLUTE: 0.02 E9/L (ref 0–0.2)
BASOPHILS RELATIVE PERCENT: 0.3 % (ref 0–2)
BILIRUB SERPL-MCNC: 0.7 MG/DL (ref 0–1.2)
BUN BLDV-MCNC: 16 MG/DL (ref 8–23)
CALCIUM SERPL-MCNC: 9.8 MG/DL (ref 8.6–10.2)
CHLORIDE BLD-SCNC: 105 MMOL/L (ref 98–107)
CHOLESTEROL, TOTAL: 173 MG/DL (ref 0–199)
CO2: 23 MMOL/L (ref 22–29)
CREAT SERPL-MCNC: 0.9 MG/DL (ref 0.5–1)
EOSINOPHILS ABSOLUTE: 0.23 E9/L (ref 0.05–0.5)
EOSINOPHILS RELATIVE PERCENT: 3.9 % (ref 0–6)
GFR AFRICAN AMERICAN: >60
GFR NON-AFRICAN AMERICAN: >60 ML/MIN/1.73
GLUCOSE BLD-MCNC: 103 MG/DL (ref 74–99)
HBA1C MFR BLD: 5.5 % (ref 4–5.6)
HCT VFR BLD CALC: 44.6 % (ref 34–48)
HDLC SERPL-MCNC: 39 MG/DL
HEMOGLOBIN: 14.5 G/DL (ref 11.5–15.5)
IMMATURE GRANULOCYTES #: 0.03 E9/L
IMMATURE GRANULOCYTES %: 0.5 % (ref 0–5)
LDL CHOLESTEROL CALCULATED: 109 MG/DL (ref 0–99)
LYMPHOCYTES ABSOLUTE: 1.68 E9/L (ref 1.5–4)
LYMPHOCYTES RELATIVE PERCENT: 28.3 % (ref 20–42)
MCH RBC QN AUTO: 30.6 PG (ref 26–35)
MCHC RBC AUTO-ENTMCNC: 32.5 % (ref 32–34.5)
MCV RBC AUTO: 94.1 FL (ref 80–99.9)
MONOCYTES ABSOLUTE: 0.49 E9/L (ref 0.1–0.95)
MONOCYTES RELATIVE PERCENT: 8.3 % (ref 2–12)
NEUTROPHILS ABSOLUTE: 3.48 E9/L (ref 1.8–7.3)
NEUTROPHILS RELATIVE PERCENT: 58.7 % (ref 43–80)
PDW BLD-RTO: 12.9 FL (ref 11.5–15)
PLATELET # BLD: 268 E9/L (ref 130–450)
PMV BLD AUTO: 11.7 FL (ref 7–12)
POTASSIUM SERPL-SCNC: 4.5 MMOL/L (ref 3.5–5)
RBC # BLD: 4.74 E12/L (ref 3.5–5.5)
SODIUM BLD-SCNC: 144 MMOL/L (ref 132–146)
T4 TOTAL: 8 MCG/DL (ref 4.5–11.7)
TOTAL PROTEIN: 7.2 G/DL (ref 6.4–8.3)
TRIGL SERPL-MCNC: 127 MG/DL (ref 0–149)
TSH SERPL DL<=0.05 MIU/L-ACNC: 1.77 UIU/ML (ref 0.27–4.2)
VITAMIN D 25-HYDROXY: 30 NG/ML (ref 30–100)
VLDLC SERPL CALC-MCNC: 25 MG/DL
WBC # BLD: 5.9 E9/L (ref 4.5–11.5)

## 2019-07-09 PROCEDURE — 80061 LIPID PANEL: CPT

## 2019-07-09 PROCEDURE — 84443 ASSAY THYROID STIM HORMONE: CPT

## 2019-07-09 PROCEDURE — 84436 ASSAY OF TOTAL THYROXINE: CPT

## 2019-07-09 PROCEDURE — 83036 HEMOGLOBIN GLYCOSYLATED A1C: CPT

## 2019-07-09 PROCEDURE — 36415 COLL VENOUS BLD VENIPUNCTURE: CPT

## 2019-07-09 PROCEDURE — 80053 COMPREHEN METABOLIC PANEL: CPT

## 2019-07-09 PROCEDURE — 85025 COMPLETE CBC W/AUTO DIFF WBC: CPT

## 2019-07-09 PROCEDURE — 82306 VITAMIN D 25 HYDROXY: CPT

## 2019-07-17 RX ORDER — PREDNISONE 10 MG/1
10 TABLET ORAL DAILY
COMMUNITY
End: 2019-08-20

## 2019-07-17 RX ORDER — BETAMETHASONE DIPROPIONATE 0.5 MG/G
CREAM TOPICAL 2 TIMES DAILY
COMMUNITY
End: 2021-01-20 | Stop reason: ALTCHOICE

## 2019-07-19 ENCOUNTER — OFFICE VISIT (OUTPATIENT)
Dept: PRIMARY CARE CLINIC | Age: 69
End: 2019-07-19
Payer: MEDICARE

## 2019-07-19 VITALS — HEIGHT: 65 IN | BODY MASS INDEX: 42.49 KG/M2 | WEIGHT: 255 LBS | TEMPERATURE: 98.1 F

## 2019-07-19 DIAGNOSIS — E78.2 MIXED HYPERLIPIDEMIA: Primary | ICD-10-CM

## 2019-07-19 DIAGNOSIS — F41.9 ANXIETY: ICD-10-CM

## 2019-07-19 DIAGNOSIS — E55.9 VITAMIN D DEFICIENCY: ICD-10-CM

## 2019-07-19 DIAGNOSIS — M17.12 PRIMARY OSTEOARTHRITIS OF LEFT KNEE: ICD-10-CM

## 2019-07-19 DIAGNOSIS — I83.93 VARICOSE VEINS OF BOTH LOWER EXTREMITIES, UNSPECIFIED WHETHER COMPLICATED: Primary | ICD-10-CM

## 2019-07-19 DIAGNOSIS — E66.01 CLASS 3 SEVERE OBESITY DUE TO EXCESS CALORIES WITH SERIOUS COMORBIDITY AND BODY MASS INDEX (BMI) OF 40.0 TO 44.9 IN ADULT (HCC): ICD-10-CM

## 2019-07-19 DIAGNOSIS — J01.80 ACUTE NON-RECURRENT SINUSITIS OF OTHER SINUS: ICD-10-CM

## 2019-07-19 DIAGNOSIS — E03.9 ACQUIRED HYPOTHYROIDISM: ICD-10-CM

## 2019-07-19 PROBLEM — E66.813 CLASS 3 SEVERE OBESITY DUE TO EXCESS CALORIES WITH SERIOUS COMORBIDITY AND BODY MASS INDEX (BMI) OF 40.0 TO 44.9 IN ADULT: Status: ACTIVE | Noted: 2019-07-19

## 2019-07-19 PROCEDURE — 99214 OFFICE O/P EST MOD 30 MIN: CPT | Performed by: FAMILY MEDICINE

## 2019-07-19 RX ORDER — BUPROPION HYDROCHLORIDE 300 MG/1
300 TABLET ORAL EVERY MORNING
Qty: 90 TABLET | Refills: 5 | Status: SHIPPED
Start: 2019-07-19 | End: 2020-09-18 | Stop reason: SDUPTHER

## 2019-07-19 RX ORDER — SIMVASTATIN 40 MG
40 TABLET ORAL NIGHTLY
Qty: 90 TABLET | Refills: 5 | Status: SHIPPED | OUTPATIENT
Start: 2019-07-19 | End: 2020-01-24

## 2019-07-19 RX ORDER — CEPHALEXIN 500 MG/1
500 CAPSULE ORAL 3 TIMES DAILY
Qty: 21 CAPSULE | Refills: 0 | Status: SHIPPED | OUTPATIENT
Start: 2019-07-19 | End: 2019-07-26

## 2019-07-19 ASSESSMENT — ENCOUNTER SYMPTOMS
GASTROINTESTINAL NEGATIVE: 1
EYES NEGATIVE: 1
RESPIRATORY NEGATIVE: 1
ALLERGIC/IMMUNOLOGIC NEGATIVE: 1

## 2019-07-23 ENCOUNTER — TELEPHONE (OUTPATIENT)
Dept: PRIMARY CARE CLINIC | Age: 69
End: 2019-07-23

## 2019-07-30 ENCOUNTER — TELEPHONE (OUTPATIENT)
Dept: PRIMARY CARE CLINIC | Age: 69
End: 2019-07-30

## 2019-07-31 ENCOUNTER — TELEPHONE (OUTPATIENT)
Dept: PRIMARY CARE CLINIC | Age: 69
End: 2019-07-31

## 2019-07-31 NOTE — TELEPHONE ENCOUNTER
Pt called was seen 7/19 for cough and sinus   abx was sent but asking for a cough medication to be sent to [facility]

## 2019-08-04 DIAGNOSIS — E78.2 MIXED HYPERLIPIDEMIA: ICD-10-CM

## 2019-08-04 RX ORDER — SIMVASTATIN 40 MG
40 TABLET ORAL NIGHTLY
Qty: 90 TABLET | Refills: 5 | Status: SHIPPED
Start: 2019-08-04 | End: 2020-09-18 | Stop reason: SDUPTHER

## 2019-08-20 ENCOUNTER — OFFICE VISIT (OUTPATIENT)
Dept: VASCULAR SURGERY | Age: 69
End: 2019-08-20
Payer: MEDICARE

## 2019-08-20 ENCOUNTER — OFFICE VISIT (OUTPATIENT)
Dept: PRIMARY CARE CLINIC | Age: 69
End: 2019-08-20
Payer: MEDICARE

## 2019-08-20 VITALS
HEART RATE: 87 BPM | SYSTOLIC BLOOD PRESSURE: 135 MMHG | WEIGHT: 255 LBS | HEIGHT: 65 IN | TEMPERATURE: 98.1 F | BODY MASS INDEX: 42.49 KG/M2 | OXYGEN SATURATION: 98 % | DIASTOLIC BLOOD PRESSURE: 82 MMHG

## 2019-08-20 VITALS — HEART RATE: 84 BPM | SYSTOLIC BLOOD PRESSURE: 138 MMHG | DIASTOLIC BLOOD PRESSURE: 82 MMHG

## 2019-08-20 DIAGNOSIS — I83.813 VARICOSE VEINS OF LEG WITH PAIN, BILATERAL: Primary | ICD-10-CM

## 2019-08-20 DIAGNOSIS — E66.01 CLASS 3 SEVERE OBESITY DUE TO EXCESS CALORIES WITH SERIOUS COMORBIDITY AND BODY MASS INDEX (BMI) OF 40.0 TO 44.9 IN ADULT (HCC): ICD-10-CM

## 2019-08-20 DIAGNOSIS — J20.8 ACUTE BACTERIAL BRONCHITIS: Primary | ICD-10-CM

## 2019-08-20 DIAGNOSIS — B96.89 ACUTE BACTERIAL BRONCHITIS: Primary | ICD-10-CM

## 2019-08-20 PROCEDURE — G8417 CALC BMI ABV UP PARAM F/U: HCPCS | Performed by: NURSE PRACTITIONER

## 2019-08-20 PROCEDURE — G8598 ASA/ANTIPLAT THER USED: HCPCS | Performed by: NURSE PRACTITIONER

## 2019-08-20 PROCEDURE — 99201 PR OFFICE OUTPATIENT NEW 10 MINUTES: CPT | Performed by: NURSE PRACTITIONER

## 2019-08-20 PROCEDURE — 99213 OFFICE O/P EST LOW 20 MIN: CPT | Performed by: FAMILY MEDICINE

## 2019-08-20 PROCEDURE — 1123F ACP DISCUSS/DSCN MKR DOCD: CPT | Performed by: NURSE PRACTITIONER

## 2019-08-20 PROCEDURE — G8400 PT W/DXA NO RESULTS DOC: HCPCS | Performed by: NURSE PRACTITIONER

## 2019-08-20 PROCEDURE — 4040F PNEUMOC VAC/ADMIN/RCVD: CPT | Performed by: NURSE PRACTITIONER

## 2019-08-20 PROCEDURE — 1036F TOBACCO NON-USER: CPT | Performed by: NURSE PRACTITIONER

## 2019-08-20 PROCEDURE — 1090F PRES/ABSN URINE INCON ASSESS: CPT | Performed by: NURSE PRACTITIONER

## 2019-08-20 PROCEDURE — G8427 DOCREV CUR MEDS BY ELIG CLIN: HCPCS | Performed by: NURSE PRACTITIONER

## 2019-08-20 PROCEDURE — 3017F COLORECTAL CA SCREEN DOC REV: CPT | Performed by: NURSE PRACTITIONER

## 2019-08-20 RX ORDER — CEFDINIR 300 MG/1
300 CAPSULE ORAL 2 TIMES DAILY
COMMUNITY
End: 2020-01-24

## 2019-08-20 RX ORDER — BENZONATATE 200 MG/1
200 CAPSULE ORAL 3 TIMES DAILY PRN
Qty: 30 CAPSULE | Refills: 1 | Status: SHIPPED | OUTPATIENT
Start: 2019-08-20 | End: 2020-01-24

## 2019-08-20 RX ORDER — CEFDINIR 300 MG/1
300 CAPSULE ORAL 2 TIMES DAILY
Qty: 20 CAPSULE | Refills: 0 | Status: SHIPPED | OUTPATIENT
Start: 2019-08-20 | End: 2019-08-20 | Stop reason: ALTCHOICE

## 2019-08-20 ASSESSMENT — ENCOUNTER SYMPTOMS
STRIDOR: 0
SHORTNESS OF BREATH: 0
COUGH: 1
GASTROINTESTINAL NEGATIVE: 1
CHOKING: 0
CHEST TIGHTNESS: 0

## 2019-08-20 NOTE — PROGRESS NOTES
nightly 8/4/19  Yes Manan Gallegos DO   buPROPion (WELLBUTRIN XL) 300 MG extended release tablet Take 1 tablet by mouth every morning 7/19/19  Yes Manan Gallegos DO   simvastatin (ZOCOR) 40 MG tablet Take 1 tablet by mouth nightly Indications: pt. is taking 5times a week 7/19/19  Yes Manan Gallegos DO   augmented betamethasone dipropionate (DIPROLENE AF) 0.05 % cream Apply topically 2 times daily Apply topically 2 times daily. Yes Historical Provider, MD   hydrocortisone 2.5 % cream Apply topically 2 times daily Apply topically 2 times daily. Yes Historical Provider, MD   aspirin-acetaminophen-caffeine (Sudie Standard) 090-774-21 MG per tablet Take 1 tablet by mouth every 6 hours as needed for Headaches   Yes Historical Provider, MD   vitamin D (CHOLECALCIFEROL) 5000 UNITS CAPS capsule Take 5,000 Units by mouth daily   Yes Historical Provider, MD   vitamin E 400 UNIT capsule Take 400 Units by mouth daily. Yes Historical Provider, MD   MULTIPLE VITAMIN PO Take 1 tablet by mouth daily. Yes Historical Provider, MD   Coenzyme Q10 (CO Q 10 PO) Take 1 tablet by mouth daily. Yes Historical Provider, MD   Phentermine-Topiramate (QSYMIA) 3.75-23 MG CP24 Take 3.75 mg by mouth daily for 14 days. Patient not taking: Reported on 8/20/2019 8/20/19 9/3/19  Manan Gallegos DO   Phentermine-Topiramate (QSYMIA) 7.5-46 MG CP24 Take 7.5 mg by mouth daily for 30 days. Patient not taking: Reported on 8/20/2019 8/20/19 9/19/19  Manan Gallegos DO     Allergies:  Patient has no known allergies.     Social History     Socioeconomic History    Marital status:      Spouse name: Not on file    Number of children: Not on file    Years of education: Not on file    Highest education level: Not on file   Occupational History    Not on file   Social Needs    Financial resource strain: Not on file    Food insecurity:     Worry: Not on file     Inability: Not on file    Transportation needs: Medical: Not on file     Non-medical: Not on file   Tobacco Use    Smoking status: Never Smoker    Smokeless tobacco: Never Used   Substance and Sexual Activity    Alcohol use:  Yes     Alcohol/week: 1.0 standard drinks     Types: 1 Glasses of wine per week     Comment: socially    Drug use: No    Sexual activity: Not on file   Lifestyle    Physical activity:     Days per week: Not on file     Minutes per session: Not on file    Stress: Not on file   Relationships    Social connections:     Talks on phone: Not on file     Gets together: Not on file     Attends Hinduism service: Not on file     Active member of club or organization: Not on file     Attends meetings of clubs or organizations: Not on file     Relationship status: Not on file    Intimate partner violence:     Fear of current or ex partner: Not on file     Emotionally abused: Not on file     Physically abused: Not on file     Forced sexual activity: Not on file   Other Topics Concern    Not on file   Social History Narrative        OA    P HYSTER    OBESITY    LIPID    BILAT TOTAL KNEE [facility]     MILD T R    TICS    LLL PNEUMONIA 11-03    L5 S1 NARROWING    COLON----2-04 5 YRS    BASAL CELL CA R SHOUDLER    L-5----S-1 ADVANCED NARROWING---- 4-09    KITCHEN AT [employer] [facility] [employer] [address]  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     LOW BACK PAIN--INJECTIONS DR Diana Morin 2016    INFLU A 3-17    OA R > L HIPS AND LUMBAR SPINE-- AND GB STONES----     GB OUT --WHO SAID LIVER WAS VERY FATTY DURING SURGERY AND ADVISED AGGRESSIVE WT LOSS    FATTY LIVER --- [address] PT 1-18 TROCHANTERIC BURSITIS FIGHT HIP--INJECTION DR HENRY 7-17    [facility] 9-17---REFERRED BACK TO DR Jackson Going    NON HEALING LEFT DISTAL FIBULA AVULSION FX 7-18    L KNEE PAIN DR Margarita Clifton SENT TO DR Franko Hicks AND TOLD NEEDS REPLACEMENT---PT PLANS JUNE 2019        Family History   Problem Relation Age of Onset    Lung Cancer Father     Thyroid Disease Sister        REVIEW OF SYSTEMS (Recent symptoms): Negative if blank [], Positive if [x]       Constitutional    [] Fevers / chills  [] General weakness   [] Poor appetite    [] Weight gain or loss  Eyes    [] Blurred vision  [] Wear glasses / contacts   [] Visual disturbances   [] Blindness  Ears, Nose, Throat    [] Hearing loss  [] Ringing in ears   [] Nose bleeding    [] Voice hoarseness  [] Difficulty swallowing      Lungs    [] Cough  [] Chest pain   [] Wheezing    [] Difficult breathing  Heart    [] Chest pain during activity  [] Dizziness   [] Irregular heart beat    [] Fainting episode    [] Leg swelling   Stomach, Intestines    [] Intestinal bleeding  [] Heart burn   [] Abdominal pain    [] Stomach ulcers   [] Change in bowel habits      Kidney, Prostate    [] Frequent need to urinate  [] Incontinence   [] Blood in urine    [] Erectile dysfunction (men)      [] All negative   Hematologic, Lymphatic,   Skin, Musculoskeletal    [] Easy bruising  [] Swollen lymph nodes   [] Skin lesions, ulcers   [] Skin rash  [] Neck or back pain   [x] Leg / foot pain     Neurologic    [] Speech problems  [] Memory loss   [] Headaches     [] Walking problems    [] Hand / arm / leg weakness   [] Numbness in arms or legs           PHYSICAL EXAM:      CONSTITUTIONAL:  awake, alert, cooperative, no apparent distress, and appears stated age  EYES:  extra-ocular muscles intact and vision intact  ENT:  normocepalic, without obvious abnormality, atraumatic  NECK:  supple, symmetrical, trachea midline, no jugular venous distension, no carotid bruits,  and MASSES:  no

## 2019-11-06 ENCOUNTER — OFFICE VISIT (OUTPATIENT)
Dept: PRIMARY CARE CLINIC | Age: 69
End: 2019-11-06
Payer: MEDICARE

## 2019-11-06 VITALS
HEART RATE: 85 BPM | SYSTOLIC BLOOD PRESSURE: 126 MMHG | OXYGEN SATURATION: 98 % | BODY MASS INDEX: 41.82 KG/M2 | TEMPERATURE: 98.2 F | DIASTOLIC BLOOD PRESSURE: 86 MMHG | RESPIRATION RATE: 18 BRPM | HEIGHT: 65 IN | WEIGHT: 251 LBS

## 2019-11-06 DIAGNOSIS — Z00.00 ROUTINE GENERAL MEDICAL EXAMINATION AT A HEALTH CARE FACILITY: ICD-10-CM

## 2019-11-06 DIAGNOSIS — Z23 NEED FOR PROPHYLACTIC VACCINATION AGAINST STREPTOCOCCUS PNEUMONIAE (PNEUMOCOCCUS): ICD-10-CM

## 2019-11-06 PROCEDURE — 4040F PNEUMOC VAC/ADMIN/RCVD: CPT | Performed by: PHYSICIAN ASSISTANT

## 2019-11-06 PROCEDURE — G0009 ADMIN PNEUMOCOCCAL VACCINE: HCPCS | Performed by: PHYSICIAN ASSISTANT

## 2019-11-06 PROCEDURE — 1123F ACP DISCUSS/DSCN MKR DOCD: CPT | Performed by: PHYSICIAN ASSISTANT

## 2019-11-06 PROCEDURE — G0439 PPPS, SUBSEQ VISIT: HCPCS | Performed by: PHYSICIAN ASSISTANT

## 2019-11-06 PROCEDURE — 3017F COLORECTAL CA SCREEN DOC REV: CPT | Performed by: PHYSICIAN ASSISTANT

## 2019-11-06 PROCEDURE — G8484 FLU IMMUNIZE NO ADMIN: HCPCS | Performed by: PHYSICIAN ASSISTANT

## 2019-11-06 PROCEDURE — G8598 ASA/ANTIPLAT THER USED: HCPCS | Performed by: PHYSICIAN ASSISTANT

## 2019-11-06 PROCEDURE — 90732 PPSV23 VACC 2 YRS+ SUBQ/IM: CPT | Performed by: PHYSICIAN ASSISTANT

## 2019-11-06 ASSESSMENT — LIFESTYLE VARIABLES
AUDIT-C TOTAL SCORE: 1
HOW MANY STANDARD DRINKS CONTAINING ALCOHOL DO YOU HAVE ON A TYPICAL DAY: 0
HOW OFTEN DURING THE LAST YEAR HAVE YOU NEEDED AN ALCOHOLIC DRINK FIRST THING IN THE MORNING TO GET YOURSELF GOING AFTER A NIGHT OF HEAVY DRINKING: 0
HAVE YOU OR SOMEONE ELSE BEEN INJURED AS A RESULT OF YOUR DRINKING: 0
HOW OFTEN DURING THE LAST YEAR HAVE YOU FAILED TO DO WHAT WAS NORMALLY EXPECTED FROM YOU BECAUSE OF DRINKING: 0
HOW OFTEN DO YOU HAVE SIX OR MORE DRINKS ON ONE OCCASION: 0
HOW OFTEN DURING THE LAST YEAR HAVE YOU HAD A FEELING OF GUILT OR REMORSE AFTER DRINKING: 0
HOW OFTEN DURING THE LAST YEAR HAVE YOU FOUND THAT YOU WERE NOT ABLE TO STOP DRINKING ONCE YOU HAD STARTED: 0
AUDIT TOTAL SCORE: 1
HOW OFTEN DO YOU HAVE A DRINK CONTAINING ALCOHOL: 1
HOW OFTEN DURING THE LAST YEAR HAVE YOU BEEN UNABLE TO REMEMBER WHAT HAPPENED THE NIGHT BEFORE BECAUSE YOU HAD BEEN DRINKING: 0
HAS A RELATIVE, FRIEND, DOCTOR, OR ANOTHER HEALTH PROFESSIONAL EXPRESSED CONCERN ABOUT YOUR DRINKING OR SUGGESTED YOU CUT DOWN: 0

## 2019-11-06 ASSESSMENT — PATIENT HEALTH QUESTIONNAIRE - PHQ9
SUM OF ALL RESPONSES TO PHQ QUESTIONS 1-9: 1
SUM OF ALL RESPONSES TO PHQ QUESTIONS 1-9: 1

## 2020-01-17 ENCOUNTER — HOSPITAL ENCOUNTER (OUTPATIENT)
Age: 70
Discharge: HOME | End: 2020-01-19
Payer: MEDICARE

## 2020-01-17 LAB
ALBUMIN SERPL-MCNC: 4.4 G/DL (ref 3.5–5.2)
ALP BLD-CCNC: 117 U/L (ref 35–104)
ALT SERPL-CCNC: 22 U/L (ref 0–32)
ANION GAP SERPL CALCULATED.3IONS-SCNC: 16 MMOL/L (ref 7–16)
AST SERPL-CCNC: 29 U/L (ref 0–31)
BASOPHILS ABSOLUTE: 0.03 E9/L (ref 0–0.2)
BASOPHILS RELATIVE PERCENT: 0.5 % (ref 0–2)
BILIRUB SERPL-MCNC: 0.9 MG/DL (ref 0–1.2)
BUN BLDV-MCNC: 14 MG/DL (ref 8–23)
CALCIUM SERPL-MCNC: 9.9 MG/DL (ref 8.6–10.2)
CHLORIDE BLD-SCNC: 103 MMOL/L (ref 98–107)
CHOLESTEROL, TOTAL: 159 MG/DL (ref 0–199)
CO2: 22 MMOL/L (ref 22–29)
CREAT SERPL-MCNC: 0.8 MG/DL (ref 0.5–1)
EOSINOPHILS ABSOLUTE: 0.17 E9/L (ref 0.05–0.5)
EOSINOPHILS RELATIVE PERCENT: 2.6 % (ref 0–6)
GFR AFRICAN AMERICAN: >60
GFR NON-AFRICAN AMERICAN: >60 ML/MIN/1.73
GLUCOSE BLD-MCNC: 91 MG/DL (ref 74–99)
HCT VFR BLD CALC: 41.6 % (ref 34–48)
HDLC SERPL-MCNC: 41 MG/DL
HEMOGLOBIN: 13.2 G/DL (ref 11.5–15.5)
IMMATURE GRANULOCYTES #: 0.02 E9/L
IMMATURE GRANULOCYTES %: 0.3 % (ref 0–5)
LDL CHOLESTEROL CALCULATED: 95 MG/DL (ref 0–99)
LYMPHOCYTES ABSOLUTE: 1.76 E9/L (ref 1.5–4)
LYMPHOCYTES RELATIVE PERCENT: 27.1 % (ref 20–42)
MCH RBC QN AUTO: 29.2 PG (ref 26–35)
MCHC RBC AUTO-ENTMCNC: 31.7 % (ref 32–34.5)
MCV RBC AUTO: 92 FL (ref 80–99.9)
MONOCYTES ABSOLUTE: 0.46 E9/L (ref 0.1–0.95)
MONOCYTES RELATIVE PERCENT: 7.1 % (ref 2–12)
NEUTROPHILS ABSOLUTE: 4.06 E9/L (ref 1.8–7.3)
NEUTROPHILS RELATIVE PERCENT: 62.4 % (ref 43–80)
PDW BLD-RTO: 12.8 FL (ref 11.5–15)
PLATELET # BLD: 264 E9/L (ref 130–450)
PMV BLD AUTO: 11.6 FL (ref 7–12)
POTASSIUM SERPL-SCNC: 4.8 MMOL/L (ref 3.5–5)
RBC # BLD: 4.52 E12/L (ref 3.5–5.5)
SODIUM BLD-SCNC: 141 MMOL/L (ref 132–146)
T4 TOTAL: 8.5 MCG/DL (ref 4.5–11.7)
TOTAL PROTEIN: 7.2 G/DL (ref 6.4–8.3)
TRIGL SERPL-MCNC: 114 MG/DL (ref 0–149)
TSH SERPL DL<=0.05 MIU/L-ACNC: 1.34 UIU/ML (ref 0.27–4.2)
VLDLC SERPL CALC-MCNC: 23 MG/DL
WBC # BLD: 6.5 E9/L (ref 4.5–11.5)

## 2020-01-17 PROCEDURE — 36415 COLL VENOUS BLD VENIPUNCTURE: CPT

## 2020-01-17 PROCEDURE — 84436 ASSAY OF TOTAL THYROXINE: CPT

## 2020-01-17 PROCEDURE — 85025 COMPLETE CBC W/AUTO DIFF WBC: CPT

## 2020-01-17 PROCEDURE — 80053 COMPREHEN METABOLIC PANEL: CPT

## 2020-01-17 PROCEDURE — 84443 ASSAY THYROID STIM HORMONE: CPT

## 2020-01-17 PROCEDURE — 80061 LIPID PANEL: CPT

## 2020-01-24 ENCOUNTER — OFFICE VISIT (OUTPATIENT)
Dept: PRIMARY CARE CLINIC | Age: 70
End: 2020-01-24
Payer: MEDICARE

## 2020-01-24 VITALS
HEART RATE: 82 BPM | SYSTOLIC BLOOD PRESSURE: 138 MMHG | BODY MASS INDEX: 42.65 KG/M2 | HEIGHT: 65 IN | TEMPERATURE: 97.9 F | RESPIRATION RATE: 16 BRPM | OXYGEN SATURATION: 97 % | DIASTOLIC BLOOD PRESSURE: 83 MMHG | WEIGHT: 256 LBS

## 2020-01-24 PROBLEM — F41.9 ANXIETY: Chronic | Status: ACTIVE | Noted: 2019-07-19

## 2020-01-24 PROBLEM — K76.0 FATTY LIVER: Chronic | Status: ACTIVE | Noted: 2019-06-04

## 2020-01-24 PROBLEM — M17.12 PRIMARY OSTEOARTHRITIS OF LEFT KNEE: Chronic | Status: ACTIVE | Noted: 2019-06-04

## 2020-01-24 PROBLEM — R94.31 EKG ABNORMALITIES: Status: ACTIVE | Noted: 2020-01-24

## 2020-01-24 PROBLEM — E66.01 CLASS 3 SEVERE OBESITY DUE TO EXCESS CALORIES WITH SERIOUS COMORBIDITY AND BODY MASS INDEX (BMI) OF 40.0 TO 44.9 IN ADULT (HCC): Chronic | Status: ACTIVE | Noted: 2019-07-19

## 2020-01-24 PROBLEM — I10 ESSENTIAL HYPERTENSION: Chronic | Status: ACTIVE | Noted: 2019-06-04

## 2020-01-24 PROBLEM — E78.2 MIXED HYPERLIPIDEMIA: Chronic | Status: ACTIVE | Noted: 2019-06-04

## 2020-01-24 PROBLEM — E55.9 VITAMIN D DEFICIENCY: Chronic | Status: ACTIVE | Noted: 2019-07-19

## 2020-01-24 PROBLEM — E66.813 CLASS 3 SEVERE OBESITY DUE TO EXCESS CALORIES WITH SERIOUS COMORBIDITY AND BODY MASS INDEX (BMI) OF 40.0 TO 44.9 IN ADULT: Chronic | Status: ACTIVE | Noted: 2019-07-19

## 2020-01-24 PROCEDURE — G8417 CALC BMI ABV UP PARAM F/U: HCPCS | Performed by: FAMILY MEDICINE

## 2020-01-24 PROCEDURE — 99214 OFFICE O/P EST MOD 30 MIN: CPT | Performed by: FAMILY MEDICINE

## 2020-01-24 PROCEDURE — 1036F TOBACCO NON-USER: CPT | Performed by: FAMILY MEDICINE

## 2020-01-24 PROCEDURE — G9899 SCRN MAM PERF RSLTS DOC: HCPCS | Performed by: FAMILY MEDICINE

## 2020-01-24 PROCEDURE — G8399 PT W/DXA RESULTS DOCUMENT: HCPCS | Performed by: FAMILY MEDICINE

## 2020-01-24 PROCEDURE — 93000 ELECTROCARDIOGRAM COMPLETE: CPT | Performed by: FAMILY MEDICINE

## 2020-01-24 PROCEDURE — 3017F COLORECTAL CA SCREEN DOC REV: CPT | Performed by: FAMILY MEDICINE

## 2020-01-24 PROCEDURE — 1123F ACP DISCUSS/DSCN MKR DOCD: CPT | Performed by: FAMILY MEDICINE

## 2020-01-24 PROCEDURE — G8427 DOCREV CUR MEDS BY ELIG CLIN: HCPCS | Performed by: FAMILY MEDICINE

## 2020-01-24 PROCEDURE — 4040F PNEUMOC VAC/ADMIN/RCVD: CPT | Performed by: FAMILY MEDICINE

## 2020-01-24 PROCEDURE — 1090F PRES/ABSN URINE INCON ASSESS: CPT | Performed by: FAMILY MEDICINE

## 2020-01-24 PROCEDURE — G8484 FLU IMMUNIZE NO ADMIN: HCPCS | Performed by: FAMILY MEDICINE

## 2020-01-24 ASSESSMENT — PATIENT HEALTH QUESTIONNAIRE - PHQ9
1. LITTLE INTEREST OR PLEASURE IN DOING THINGS: 0
SUM OF ALL RESPONSES TO PHQ9 QUESTIONS 1 & 2: 0
SUM OF ALL RESPONSES TO PHQ QUESTIONS 1-9: 0
2. FEELING DOWN, DEPRESSED OR HOPELESS: 0
SUM OF ALL RESPONSES TO PHQ QUESTIONS 1-9: 0

## 2020-01-24 ASSESSMENT — ENCOUNTER SYMPTOMS
ALLERGIC/IMMUNOLOGIC NEGATIVE: 1
EYES NEGATIVE: 1
RESPIRATORY NEGATIVE: 1
GASTROINTESTINAL NEGATIVE: 1

## 2020-01-24 NOTE — PROGRESS NOTES
20  Name: Gila Hobson    : 1950    Sex: female    Age: 68 y.o. Subjective:  Chief Complaint: Patient is here for 6-month checkup regarding cholesterol and labs. No chest pain or shortness of breath. Cholesterol decreased from 173-159. Blood sugars better. No chest pain or shortness of breath. He did fill the qsymia  but failed to continue with it. To causing GI upset. Patient came in 30 minutes late today. Right heel surgery planned for  Friday and needs clearance        jsut  Found out she needs surgery        Review of Systems   Constitutional: Negative. HENT: Negative. Eyes: Negative. Respiratory: Negative. Cardiovascular: Negative. Gastrointestinal: Negative. Endocrine: Negative. Genitourinary: Negative. Musculoskeletal: Positive for arthralgias (bilat knee pain). Right heel pain   Skin: Negative. Allergic/Immunologic: Negative. Neurological: Negative. Hematological: Negative. Psychiatric/Behavioral: Negative. Current Outpatient Medications:     simvastatin (ZOCOR) 40 MG tablet, Take 1 tablet by mouth nightly, Disp: 90 tablet, Rfl: 5    buPROPion (WELLBUTRIN XL) 300 MG extended release tablet, Take 1 tablet by mouth every morning, Disp: 90 tablet, Rfl: 5    augmented betamethasone dipropionate (DIPROLENE AF) 0.05 % cream, Apply topically 2 times daily Apply topically 2 times daily. , Disp: , Rfl:     hydrocortisone 2.5 % cream, Apply topically 2 times daily Apply topically 2 times daily. , Disp: , Rfl:     aspirin-acetaminophen-caffeine (EXCEDRIN MIGRAINE) 250-250-65 MG per tablet, Take 1 tablet by mouth every 6 hours as needed for Headaches, Disp: , Rfl:     vitamin D (CHOLECALCIFEROL) 5000 UNITS CAPS capsule, Take 5,000 Units by mouth daily, Disp: , Rfl:     vitamin E 400 UNIT capsule, Take 400 Units by mouth daily. , Disp: , Rfl:     Coenzyme Q10 (CO Q 10 PO), Take 1 tablet by mouth daily.   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     LOW BACK PAIN--INJECTIONS DR Elsa Shipley 2016    INFLU A 3-17    OA R > L HIPS AND LUMBAR SPINE-- AND GB STONES---- 7-17    GB OUT 8-17--WHO SAID LIVER WAS VERY FATTY DURING SURGERY AND ADVISED AGGRESSIVE WT LOSS    FATTY LIVER 9-17---DR KEARNEY DC PT 1-18    TROCHANTERIC BURSITIS FIGHT HIP--INJECTION DR HENRY 7-17    MRI [facility] 9-17---REFERRED BACK TO DR Elsa Shipley    NON HEALING LEFT DISTAL FIBULA AVULSION FX 7-18    L KNEE PAIN DR Stephen Section SENT TO DR Darien Hernandez 1-19 AND TOLD NEEDS REPLACEMENT---PT PLANS JUNE 2019    Neg heart cath   6-19      Past Medical History:   Diagnosis Date    Anxiety     Basal cell carcinoma (BCC) of right shoulder     Chronic back pain     Closed avulsion fracture of distal fibula with delayed healing, left     Degenerative joint disease involving multiple joints     Diverticulitis     Hyperlipidemia     Hypertension     Essential hypertension    Liver disease     LLL pneumonia (HCC)     Obesity     Osteoarthritis     Palpitations 9/17/2012    Sleep apnea     Tricuspid regurgitation     Trochanteric bursitis of right hip     Varicose veins of both lower extremities      Family History   Problem Relation Age of Onset    Lung Cancer Father     Thyroid Disease Sister       Past Surgical History:   Procedure Laterality Date    BREAST SURGERY  1990    cyst lt breast    CARDIAC CATHETERIZATION  06/19/2019    dr Arthur Cabral  5/2012    HYSTERECTOMY  1980    JOINT REPLACEMENT  2001    L knee    JOINT REPLACEMENT  2001    rt knee      Vitals:    01/24/20 1055   BP: 138/83   Pulse: 82   Resp: 16   Temp: 97.9 °F (36.6 °C)   SpO2: 97%   Weight: 256 lb (116.1 kg)   Height: 5' 5\" (1.651 m)       Objective:    Physical Exam  Vitals signs reviewed.    Constitutional:

## 2020-01-25 ENCOUNTER — TELEPHONE (OUTPATIENT)
Dept: ADMINISTRATIVE | Age: 70
End: 2020-01-25

## 2020-01-25 PROBLEM — I65.22 CAROTID STENOSIS, LEFT: Status: ACTIVE | Noted: 2020-01-25

## 2020-01-25 PROBLEM — I65.22 CAROTID STENOSIS, LEFT: Chronic | Status: ACTIVE | Noted: 2020-01-25

## 2020-01-25 PROBLEM — R94.31 EKG ABNORMALITIES: Chronic | Status: ACTIVE | Noted: 2020-01-24

## 2020-01-25 NOTE — TELEPHONE ENCOUNTER
Pt in [address] [facility] for EKG abnormalities, Pre-operative clearance. Pt saw Dr. Francisco Gonzalez last on 6/13/19. Please advise.

## 2020-01-28 NOTE — PROGRESS NOTES
[facility]  [facility] Merida. Geneva Barnett M.D. Oneyda Jaeger M.D.  Mary Limb. Ridge Hawthorne M.D. Miguel Garzon M.D. Juan Suggs M.D. MD Parish [facility]   1950  Nathan Buck,       This 64-year-old woman is seen today for outpatient cardiac follow-up. A stress myocardial perfusion study in June 2019 showed a large reversible defect. It was done as part of a preoperative cardiac risk assessment for knee surgery. Catheterization 06/19/2019 showed no coronary disease. She was judged in June to be low risk from a cardiac perspective. Apparently the surgery was not performed and she is now a candidate for Achilles surgery. She has no cardiovascular symptoms. Medical History:  1. Non-smoker  2. Anxiety. 3. Hyperlipidemia. Total cholesterol 159, triglycerides 114, HDL 41, LDL 95 (01/2020)  4. Osteoarthritis. 5. Obesity. BMI 41.93- 01/2020.  6. Surgical history: Hysterectomy, breast biopsy, bilateral knee replacement  7. Echo, 05/01/2012. Normal EF. No dilatation. Borderline LVH. No valve abnormality  8. Treadmill MPS, 04/23/2012. 4.6 METS. EKG negative. Small apical reversible defect. EF 73%. 9. Lexiscan MPS, 06/12/2019. Large moderately intense reversible defect of most of the anterior wall, septum and a small part of the adjacent anterolateral wall. Small reversible defect distal inferior septum. Total perfusion defect size large. 5. Cardiac catheterization, 06/19/2019. No hemodynamically significant stenosis/plaques  (Dr. Hossein Lowery).       Review of Systems:  Constitutional: negative for fever and chills  Respiratory: negative for cough and hemoptysis  Cardiovascular:   Gastrointestinal: negative for abdominal pain, diarrhea, nausea and vomiting  Genitourinary:negative for dysuria and hematuria  Derm: negative for rash and skin lesion(s)  Neurological: negative for seizures and tremors  Endocrine: negative for diabetic symptoms including polydipsia and polyuria  Musculoskeletal: negative for CTD  Psychiatric: negative for psychosis and major depression    On examination, she is an alert pleasant elderly female in no distress. Skin is warm and dry. Respirations are unlabored. /64   Pulse 77   Resp 16   Ht 5' 5\" (1.651 m)   Wt 252 lb (114.3 kg)   BMI 41.93 kg/m² . HEENT negative for scleral icterus. Extraocular muscles intact. No facial asymmetry or central cyanosis. Neck without masses or goiter. No bruit or JVD. Cardiac apex not displaced. Rhythm regular. Abdomen normal.  Extremities without edema. EKG today shows sinus rhythm 77/min. Low precordial voltage. The patient is considered low risk for surgery from a cardiac perspective. She reports that a carotid ultrasound has been scheduled. At completion of today's visit, medications include the following:    Current Outpatient Medications:     simvastatin (ZOCOR) 40 MG tablet, Take 1 tablet by mouth nightly, Disp: 90 tablet, Rfl: 5    buPROPion (WELLBUTRIN XL) 300 MG extended release tablet, Take 1 tablet by mouth every morning, Disp: 90 tablet, Rfl: 5    hydrocortisone 2.5 % cream, Apply topically 2 times daily Apply topically 2 times daily. , Disp: , Rfl:     aspirin-acetaminophen-caffeine (EXCEDRIN MIGRAINE) 250-250-65 MG per tablet, Take 1 tablet by mouth every 6 hours as needed for Headaches, Disp: , Rfl:     vitamin D (CHOLECALCIFEROL) 5000 UNITS CAPS capsule, Take 5,000 Units by mouth daily, Disp: , Rfl:     vitamin E 400 UNIT capsule, Take 400 Units by mouth daily. , Disp: , Rfl:     Coenzyme Q10 (CO Q 10 PO), Take 1 tablet by mouth daily. , Disp: , Rfl:     augmented betamethasone dipropionate (DIPROLENE AF) 0.05 % cream, Apply topically 2 times daily Apply topically 2 times daily. , Disp: , Rfl:       Note: This report was completed utilizing computer voice recognition software.  Every effort has been made to ensure accuracy, however; inadvertent computerized transcription errors may be present. Author Mona.  Lucila Abreu MD       [address] [facility]   ,  and Dr Bailey Rinaldi

## 2020-01-29 ENCOUNTER — OFFICE VISIT (OUTPATIENT)
Dept: CARDIOLOGY CLINIC | Age: 70
End: 2020-01-29
Payer: MEDICARE

## 2020-01-29 VITALS
DIASTOLIC BLOOD PRESSURE: 64 MMHG | SYSTOLIC BLOOD PRESSURE: 138 MMHG | HEART RATE: 77 BPM | HEIGHT: 65 IN | WEIGHT: 252 LBS | BODY MASS INDEX: 41.99 KG/M2 | RESPIRATION RATE: 16 BRPM

## 2020-01-29 PROCEDURE — 1036F TOBACCO NON-USER: CPT | Performed by: INTERNAL MEDICINE

## 2020-01-29 PROCEDURE — G8399 PT W/DXA RESULTS DOCUMENT: HCPCS | Performed by: INTERNAL MEDICINE

## 2020-01-29 PROCEDURE — G8484 FLU IMMUNIZE NO ADMIN: HCPCS | Performed by: INTERNAL MEDICINE

## 2020-01-29 PROCEDURE — 99213 OFFICE O/P EST LOW 20 MIN: CPT | Performed by: INTERNAL MEDICINE

## 2020-01-29 PROCEDURE — 3017F COLORECTAL CA SCREEN DOC REV: CPT | Performed by: INTERNAL MEDICINE

## 2020-01-29 PROCEDURE — 1123F ACP DISCUSS/DSCN MKR DOCD: CPT | Performed by: INTERNAL MEDICINE

## 2020-01-29 PROCEDURE — 4040F PNEUMOC VAC/ADMIN/RCVD: CPT | Performed by: INTERNAL MEDICINE

## 2020-01-29 PROCEDURE — 1090F PRES/ABSN URINE INCON ASSESS: CPT | Performed by: INTERNAL MEDICINE

## 2020-01-29 PROCEDURE — G8427 DOCREV CUR MEDS BY ELIG CLIN: HCPCS | Performed by: INTERNAL MEDICINE

## 2020-01-29 PROCEDURE — 93000 ELECTROCARDIOGRAM COMPLETE: CPT | Performed by: INTERNAL MEDICINE

## 2020-01-29 PROCEDURE — G9899 SCRN MAM PERF RSLTS DOC: HCPCS | Performed by: INTERNAL MEDICINE

## 2020-01-29 PROCEDURE — G8417 CALC BMI ABV UP PARAM F/U: HCPCS | Performed by: INTERNAL MEDICINE

## 2020-01-31 ENCOUNTER — TELEPHONE (OUTPATIENT)
Dept: PRIMARY CARE CLINIC | Age: 70
End: 2020-01-31

## 2020-07-16 ENCOUNTER — HOSPITAL ENCOUNTER (OUTPATIENT)
Age: 70
Discharge: HOME | End: 2020-07-18
Payer: MEDICARE

## 2020-07-16 LAB
ALBUMIN SERPL-MCNC: 4.4 G/DL (ref 3.5–5.2)
ALP BLD-CCNC: 129 U/L (ref 35–104)
ALT SERPL-CCNC: 20 U/L (ref 0–32)
ANION GAP SERPL CALCULATED.3IONS-SCNC: 14 MMOL/L (ref 7–16)
AST SERPL-CCNC: 22 U/L (ref 0–31)
BACTERIA: ABNORMAL /HPF
BASOPHILS ABSOLUTE: 0.02 E9/L (ref 0–0.2)
BASOPHILS RELATIVE PERCENT: 0.3 % (ref 0–2)
BILIRUB SERPL-MCNC: 0.8 MG/DL (ref 0–1.2)
BILIRUBIN URINE: NEGATIVE
BLOOD, URINE: NEGATIVE
BUN BLDV-MCNC: 14 MG/DL (ref 8–23)
CALCIUM SERPL-MCNC: 9.9 MG/DL (ref 8.6–10.2)
CHLORIDE BLD-SCNC: 105 MMOL/L (ref 98–107)
CHOLESTEROL, TOTAL: 168 MG/DL (ref 0–199)
CLARITY: NORMAL
CO2: 24 MMOL/L (ref 22–29)
COLOR: YELLOW
CREAT SERPL-MCNC: 0.8 MG/DL (ref 0.5–1)
EOSINOPHILS ABSOLUTE: 0.28 E9/L (ref 0.05–0.5)
EOSINOPHILS RELATIVE PERCENT: 4.1 % (ref 0–6)
GFR AFRICAN AMERICAN: >60
GFR NON-AFRICAN AMERICAN: >60 ML/MIN/1.73
GLUCOSE BLD-MCNC: 104 MG/DL (ref 74–99)
GLUCOSE URINE: NEGATIVE MG/DL
HCT VFR BLD CALC: 45 % (ref 34–48)
HDLC SERPL-MCNC: 37 MG/DL
HEMOGLOBIN: 14.5 G/DL (ref 11.5–15.5)
IMMATURE GRANULOCYTES #: 0.03 E9/L
IMMATURE GRANULOCYTES %: 0.4 % (ref 0–5)
KETONES, URINE: NEGATIVE MG/DL
LDL CHOLESTEROL CALCULATED: 84 MG/DL (ref 0–99)
LEUKOCYTE ESTERASE, URINE: NEGATIVE
LYMPHOCYTES ABSOLUTE: 1.92 E9/L (ref 1.5–4)
LYMPHOCYTES RELATIVE PERCENT: 27.8 % (ref 20–42)
MCH RBC QN AUTO: 30.1 PG (ref 26–35)
MCHC RBC AUTO-ENTMCNC: 32.2 % (ref 32–34.5)
MCV RBC AUTO: 93.6 FL (ref 80–99.9)
MONOCYTES ABSOLUTE: 0.45 E9/L (ref 0.1–0.95)
MONOCYTES RELATIVE PERCENT: 6.5 % (ref 2–12)
NEUTROPHILS ABSOLUTE: 4.2 E9/L (ref 1.8–7.3)
NEUTROPHILS RELATIVE PERCENT: 60.9 % (ref 43–80)
NITRITE, URINE: NEGATIVE
PDW BLD-RTO: 12.9 FL (ref 11.5–15)
PH UA: 5.5 (ref 5–9)
PLATELET # BLD: 268 E9/L (ref 130–450)
PMV BLD AUTO: 11.5 FL (ref 7–12)
POTASSIUM SERPL-SCNC: 4.5 MMOL/L (ref 3.5–5)
PROTEIN UA: NEGATIVE MG/DL
RBC # BLD: 4.81 E12/L (ref 3.5–5.5)
RBC UA: ABNORMAL /HPF (ref 0–2)
SODIUM BLD-SCNC: 143 MMOL/L (ref 132–146)
SPECIFIC GRAVITY UA: >=1.03 (ref 1–1.03)
TOTAL PROTEIN: 7.2 G/DL (ref 6.4–8.3)
TRIGL SERPL-MCNC: 237 MG/DL (ref 0–149)
UROBILINOGEN, URINE: 0.2 E.U./DL
VLDLC SERPL CALC-MCNC: 47 MG/DL
WBC # BLD: 6.9 E9/L (ref 4.5–11.5)
WBC UA: ABNORMAL /HPF (ref 0–5)

## 2020-07-16 PROCEDURE — 81001 URINALYSIS AUTO W/SCOPE: CPT

## 2020-07-16 PROCEDURE — 85025 COMPLETE CBC W/AUTO DIFF WBC: CPT

## 2020-07-16 PROCEDURE — 80053 COMPREHEN METABOLIC PANEL: CPT

## 2020-07-16 PROCEDURE — 36415 COLL VENOUS BLD VENIPUNCTURE: CPT

## 2020-07-16 PROCEDURE — 80061 LIPID PANEL: CPT

## 2020-07-24 ENCOUNTER — OFFICE VISIT (OUTPATIENT)
Dept: PRIMARY CARE CLINIC | Age: 70
End: 2020-07-24
Payer: MEDICARE

## 2020-07-24 PROCEDURE — 4040F PNEUMOC VAC/ADMIN/RCVD: CPT | Performed by: FAMILY MEDICINE

## 2020-07-24 PROCEDURE — G9899 SCRN MAM PERF RSLTS DOC: HCPCS | Performed by: FAMILY MEDICINE

## 2020-07-24 PROCEDURE — 1036F TOBACCO NON-USER: CPT | Performed by: FAMILY MEDICINE

## 2020-07-24 PROCEDURE — G8399 PT W/DXA RESULTS DOCUMENT: HCPCS | Performed by: FAMILY MEDICINE

## 2020-07-24 PROCEDURE — G8428 CUR MEDS NOT DOCUMENT: HCPCS | Performed by: FAMILY MEDICINE

## 2020-07-24 PROCEDURE — 3017F COLORECTAL CA SCREEN DOC REV: CPT | Performed by: FAMILY MEDICINE

## 2020-07-24 PROCEDURE — 1090F PRES/ABSN URINE INCON ASSESS: CPT | Performed by: FAMILY MEDICINE

## 2020-07-24 PROCEDURE — 1123F ACP DISCUSS/DSCN MKR DOCD: CPT | Performed by: FAMILY MEDICINE

## 2020-07-24 PROCEDURE — 99214 OFFICE O/P EST MOD 30 MIN: CPT | Performed by: FAMILY MEDICINE

## 2020-07-24 PROCEDURE — G8417 CALC BMI ABV UP PARAM F/U: HCPCS | Performed by: FAMILY MEDICINE

## 2020-07-24 NOTE — PROGRESS NOTES
20  Name: Alyx Rocha    : 1950    Sex: female    Age: 68 y.o. Subjective:  Chief Complaint: Patient is here for 6-month checkup regarding cholesterol and sugar weight blood pressure anxiety    Patient feels well. No chest pain or shortness of breath. Great deal stress with her son not getting along with the family. He has had trouble with the right heel and had surgery in  that helped her a great deal.  She saw cardiology with Dr. Lucien Beltran just before that and was cleared for surgery. She has have low back pain at times. Laboratory studies do show her cholesterol is the same    Fasting blood sugar is elevated. She requested referral to Dr. Giuseppe Cochran for left knee pain, she has some anxiety and we will add BuSpar on top of her Wellbutrin. She also requests referral to Dr. Sadi CAMPOS for vein. She was referred there by Dr. Tanvi James in the past.  She needs her meds renewed. Review of Systems   Constitutional: Negative. HENT: Negative. Eyes: Negative. Respiratory: Negative. Cardiovascular: Negative. Gastrointestinal: Negative. Endocrine: Negative. Genitourinary: Negative. Musculoskeletal: Positive for back pain. Skin: Negative. Allergic/Immunologic: Negative. Neurological: Negative. Hematological: Negative. Psychiatric/Behavioral: Negative. Current Outpatient Medications:     busPIRone (BUSPAR) 7.5 MG tablet, Take 1 tablet by mouth 2 times daily as needed (anxiety), Disp: 60 tablet, Rfl: 0    simvastatin (ZOCOR) 40 MG tablet, Take 1 tablet by mouth nightly, Disp: 90 tablet, Rfl: 5    buPROPion (WELLBUTRIN XL) 300 MG extended release tablet, Take 1 tablet by mouth every morning, Disp: 90 tablet, Rfl: 5    augmented betamethasone dipropionate (DIPROLENE AF) 0.05 % cream, Apply topically 2 times daily Apply topically 2 times daily. , Disp: , Rfl:     hydrocortisone 2.5 % cream, Apply topically 2 times daily Apply topically 2 times daily. , Disp: , Rfl:     aspirin-acetaminophen-caffeine (EXCEDRIN MIGRAINE) 250-250-65 MG per tablet, Take 1 tablet by mouth every 6 hours as needed for Headaches, Disp: , Rfl:     vitamin D (CHOLECALCIFEROL) 5000 UNITS CAPS capsule, Take 5,000 Units by mouth daily, Disp: , Rfl:     vitamin E 400 UNIT capsule, Take 400 Units by mouth daily. , Disp: , Rfl:     Coenzyme Q10 (CO Q 10 PO), Take 1 tablet by mouth daily. , Disp: , Rfl:   No Known Allergies  Social History     Socioeconomic History    Marital status:      Spouse name: Not on file    Number of children: Not on file    Years of education: Not on file    Highest education level: Not on file   Occupational History    Not on file   Social Needs    Financial resource strain: Not on file    Food insecurity     Worry: Not on file     Inability: Not on file    Transportation needs     Medical: Not on file     Non-medical: Not on file   Tobacco Use    Smoking status: Never Smoker    Smokeless tobacco: Never Used   Substance and Sexual Activity    Alcohol use:  Yes     Alcohol/week: 1.0 standard drinks     Types: 1 Glasses of wine per week     Comment: socially    Drug use: No    Sexual activity: Not on file   Lifestyle    Physical activity     Days per week: Not on file     Minutes per session: Not on file    Stress: Not on file   Relationships    Social connections     Talks on phone: Not on file     Gets together: Not on file     Attends Pentecostal service: Not on file     Active member of club or organization: Not on file     Attends meetings of clubs or organizations: Not on file     Relationship status: Not on file    Intimate partner violence     Fear of current or ex partner: Not on file     Emotionally abused: Not on file     Physically abused: Not on file     Forced sexual activity: Not on file   Other Topics Concern    Not on file   Social History Narrative        OA    P HYSTER    OBESITY    LIPID    BILAT TOTAL KNEE CCF     MILD T R    TICS    LLL PNEUMONIA 11-03    L5 S1 NARROWING    COLON----2-04 5 YRS    BASAL CELL CA R SHOUDLER    L-5----S-1 ADVANCED NARROWING---- 4-09    KITCHEN AT [facility] [address]  1950 Page #2    COLON 3-12 DUNCH---10 YRS    LLL PNEUMONIA 3-12    TMT ABNORMAL  AND SEEN BY DR Mahsa Curtis AND SAID CHG LIFESTYLE AND REDUCE RISK OR LIKELY    VAS DIS IN FUTURE    RETIRE END MAY 2-14    SLEEP STUDY - ONLY MILE SLEEP APNEA    OA LUMBAR SPINE AND OA HIPS---1-15    LEFT TMJ -15    TWO SONS----Pulsant AND Sympoz CO    ONE DARYL--WORKS CO  OF [employer]---NOT     LOW BACK PAIN--INJECTIONS DR Valerio Lima 2016    INFLU A 3-    OA R > L HIPS AND LUMBAR SPINE-- AND GB STONES----     GB OUT --WHO SAID LIVER WAS VERY FATTY DURING SURGERY AND ADVISED AGGRESSIVE WT LOSS    FATTY LIVER ---DR KEARNEY DC PT -18    TROCHANTERIC BURSITIS FIGHT HIP--INJECTION DR HENRY     MRI [facility] ---REFERRED BACK TO DR Valerio Lima    NON HEALING LEFT DISTAL FIBULA AVULSION FX     L KNEE PAIN DR Mady Patel SENT TO DR SHORT  AND TOLD NEEDS REPLACEMENT---PT PLANS 2019    Neg heart cath         Past Medical History:   Diagnosis Date    Anxiety     Basal cell carcinoma (BCC) of right shoulder     Chronic back pain     Closed avulsion fracture of distal fibula with delayed healing, left     Degenerative joint disease involving multiple joints     Diverticulitis     Hyperlipidemia     Hypertension     Essential hypertension    Liver disease     LLL pneumonia ([address]     Obesity     Osteoarthritis     Palpitations 2012    Sleep apnea     Tricuspid regurgitation     Trochanteric bursitis of right hip     Varicose veins of both lower extremities      Family History   Problem Relation Age of Onset    Lung Cancer Father     Thyroid Disease Sister       Past Surgical History:   Procedure Laterality Date    BREAST SURGERY   cyst lt breast    CARDIAC CATHETERIZATION  06/19/2019    dr Aline Blanca  5/2012    HYSTERECTOMY  1980    JOINT REPLACEMENT  2001    L knee    JOINT REPLACEMENT  2001    rt knee      Vitals:    07/24/20 0928   BP: 136/82   Pulse: 73   Temp: 98.6 °F (37 °C)   SpO2: 95%   Weight: 261 lb (118.4 kg)   Height: 5' 5\" (1.651 m)       Objective:    Physical Exam  Vitals signs reviewed. Constitutional:       Appearance: She is well-developed. She is obese. HENT:      Head: Normocephalic. Eyes:      Pupils: Pupils are equal, round, and reactive to light. Neck:      Musculoskeletal: Normal range of motion. Cardiovascular:      Rate and Rhythm: Normal rate and regular rhythm. Pulmonary:      Effort: Pulmonary effort is normal.      Breath sounds: Normal breath sounds. Abdominal:      Palpations: Abdomen is soft. Musculoskeletal:      Comments: Marked decreased range of motion of the left knee. Skin:     General: Skin is warm. Neurological:      Mental Status: She is alert and oriented to person, place, and time. Psychiatric:         Behavior: Behavior normal.         Candida Kim was seen today for discuss labs. Diagnoses and all orders for this visit:    Mixed hyperlipidemia    Pre-diabetes    Essential hypertension    Primary osteoarthritis of left knee  -     External Referral To Orthopedic Surgery    Anxiety  -     busPIRone (BUSPAR) 7.5 MG tablet; Take 1 tablet by mouth 2 times daily as needed (anxiety)    Varicose veins of both lower extremities with pain  -     External Referral To Vascular Surgery        Comments: Low-fat low sugar diet. Check blood pressure weekly. Reduce carbohydrates and watch her hemoglobin A1c. Low-fat diet take cholesterol meds without food. Add to the Wellbutrin and some BuSpar for anxiety. Appoint with Dr. Blanka Jamil per her request for left knee pain. Appoint with vein center doctor to get easy regarding varicose veins.   Redo her meds.  Lose weight exercise. Low sugar. .A great deal of time spent reviewing medications, diet, exercise, social issues. Also reviewing the chart before entering the room with patient and finishing charting after leaving patient's room. More than half of that time was spent face to face with the patient in counseling and coordinating care. Follow Up: Return in about 6 months (around 1/24/2021) for lab  before.      Seen by:  Ar Lofton, DO

## 2020-07-26 VITALS
TEMPERATURE: 98.6 F | HEIGHT: 65 IN | OXYGEN SATURATION: 95 % | BODY MASS INDEX: 43.49 KG/M2 | HEART RATE: 73 BPM | DIASTOLIC BLOOD PRESSURE: 82 MMHG | WEIGHT: 261 LBS | SYSTOLIC BLOOD PRESSURE: 136 MMHG

## 2020-07-26 PROBLEM — I83.813 VARICOSE VEINS OF BOTH LOWER EXTREMITIES WITH PAIN: Status: ACTIVE | Noted: 2020-07-26

## 2020-07-26 PROBLEM — R73.03 PRE-DIABETES: Chronic | Status: ACTIVE | Noted: 2020-07-26

## 2020-07-26 PROBLEM — R73.03 PRE-DIABETES: Status: ACTIVE | Noted: 2020-07-26

## 2020-07-26 RX ORDER — BUSPIRONE HYDROCHLORIDE 7.5 MG/1
7.5 TABLET ORAL 2 TIMES DAILY PRN
Qty: 60 TABLET | Refills: 0 | Status: SHIPPED | OUTPATIENT
Start: 2020-07-26 | End: 2020-08-25

## 2020-07-26 ASSESSMENT — PATIENT HEALTH QUESTIONNAIRE - PHQ9
2. FEELING DOWN, DEPRESSED OR HOPELESS: 0
1. LITTLE INTEREST OR PLEASURE IN DOING THINGS: 0
SUM OF ALL RESPONSES TO PHQ9 QUESTIONS 1 & 2: 0
SUM OF ALL RESPONSES TO PHQ QUESTIONS 1-9: 0
SUM OF ALL RESPONSES TO PHQ QUESTIONS 1-9: 0

## 2020-07-26 ASSESSMENT — ENCOUNTER SYMPTOMS
RESPIRATORY NEGATIVE: 1
BACK PAIN: 1
ALLERGIC/IMMUNOLOGIC NEGATIVE: 1
GASTROINTESTINAL NEGATIVE: 1
EYES NEGATIVE: 1

## 2020-08-25 ENCOUNTER — OFFICE VISIT (OUTPATIENT)
Dept: PRIMARY CARE CLINIC | Age: 70
End: 2020-08-25
Payer: MEDICARE

## 2020-08-25 VITALS — SYSTOLIC BLOOD PRESSURE: 135 MMHG | TEMPERATURE: 97.8 F | DIASTOLIC BLOOD PRESSURE: 82 MMHG

## 2020-08-25 PROBLEM — G89.29 CHRONIC RIGHT-SIDED LOW BACK PAIN WITHOUT SCIATICA: Status: ACTIVE | Noted: 2020-08-25

## 2020-08-25 PROBLEM — M54.50 CHRONIC RIGHT-SIDED LOW BACK PAIN WITHOUT SCIATICA: Status: ACTIVE | Noted: 2020-08-25

## 2020-08-25 PROCEDURE — 1090F PRES/ABSN URINE INCON ASSESS: CPT | Performed by: FAMILY MEDICINE

## 2020-08-25 PROCEDURE — G8417 CALC BMI ABV UP PARAM F/U: HCPCS | Performed by: FAMILY MEDICINE

## 2020-08-25 PROCEDURE — G8427 DOCREV CUR MEDS BY ELIG CLIN: HCPCS | Performed by: FAMILY MEDICINE

## 2020-08-25 PROCEDURE — G9899 SCRN MAM PERF RSLTS DOC: HCPCS | Performed by: FAMILY MEDICINE

## 2020-08-25 PROCEDURE — 1036F TOBACCO NON-USER: CPT | Performed by: FAMILY MEDICINE

## 2020-08-25 PROCEDURE — 1123F ACP DISCUSS/DSCN MKR DOCD: CPT | Performed by: FAMILY MEDICINE

## 2020-08-25 PROCEDURE — 4040F PNEUMOC VAC/ADMIN/RCVD: CPT | Performed by: FAMILY MEDICINE

## 2020-08-25 PROCEDURE — 99213 OFFICE O/P EST LOW 20 MIN: CPT | Performed by: FAMILY MEDICINE

## 2020-08-25 PROCEDURE — 3017F COLORECTAL CA SCREEN DOC REV: CPT | Performed by: FAMILY MEDICINE

## 2020-08-25 PROCEDURE — G8399 PT W/DXA RESULTS DOCUMENT: HCPCS | Performed by: FAMILY MEDICINE

## 2020-08-25 RX ORDER — MELOXICAM 7.5 MG/1
7.5 TABLET ORAL DAILY
Qty: 30 TABLET | Refills: 1 | Status: SHIPPED
Start: 2020-08-25 | End: 2021-01-26

## 2020-08-25 ASSESSMENT — PATIENT HEALTH QUESTIONNAIRE - PHQ9
1. LITTLE INTEREST OR PLEASURE IN DOING THINGS: 0
SUM OF ALL RESPONSES TO PHQ QUESTIONS 1-9: 0
SUM OF ALL RESPONSES TO PHQ9 QUESTIONS 1 & 2: 0
2. FEELING DOWN, DEPRESSED OR HOPELESS: 0
SUM OF ALL RESPONSES TO PHQ QUESTIONS 1-9: 0

## 2020-08-25 ASSESSMENT — ENCOUNTER SYMPTOMS
RESPIRATORY NEGATIVE: 1
EYES NEGATIVE: 1
BACK PAIN: 1
ALLERGIC/IMMUNOLOGIC NEGATIVE: 1
GASTROINTESTINAL NEGATIVE: 1

## 2020-08-25 NOTE — PROGRESS NOTES
20  Name: Archie Kim    : 1950    Sex: female    Age: 68 y.o. Subjective:  Chief Complaint: Patient is here for back pain     Mid to lwo bak pain  fo rmonths     Worse with sita d  And twist   Started   After doing therapy for   foto     Felt back pop doing an exer  And sore since. No chest pain no abdominal pain no shortness of breath      Review of Systems   Constitutional: Negative. HENT: Negative. Eyes: Negative. Respiratory: Negative. Cardiovascular: Negative. Gastrointestinal: Negative. Endocrine: Negative. Genitourinary: Negative. Musculoskeletal: Positive for back pain. Skin: Negative. Allergic/Immunologic: Negative. Neurological: Negative. Hematological: Negative. Psychiatric/Behavioral: Negative. Current Outpatient Medications:     meloxicam (MOBIC) 7.5 MG tablet, Take 1 tablet by mouth daily ---food for arthritis, Disp: 30 tablet, Rfl: 1    busPIRone (BUSPAR) 7.5 MG tablet, Take 1 tablet by mouth 2 times daily as needed (anxiety), Disp: 60 tablet, Rfl: 0    simvastatin (ZOCOR) 40 MG tablet, Take 1 tablet by mouth nightly, Disp: 90 tablet, Rfl: 5    buPROPion (WELLBUTRIN XL) 300 MG extended release tablet, Take 1 tablet by mouth every morning, Disp: 90 tablet, Rfl: 5    augmented betamethasone dipropionate (DIPROLENE AF) 0.05 % cream, Apply topically 2 times daily Apply topically 2 times daily. , Disp: , Rfl:     hydrocortisone 2.5 % cream, Apply topically 2 times daily Apply topically 2 times daily. , Disp: , Rfl:     aspirin-acetaminophen-caffeine (EXCEDRIN MIGRAINE) 250-250-65 MG per tablet, Take 1 tablet by mouth every 6 hours as needed for Headaches, Disp: , Rfl:     vitamin D (CHOLECALCIFEROL) 5000 UNITS CAPS capsule, Take 5,000 Units by mouth daily, Disp: , Rfl:     vitamin E 400 UNIT capsule, Take 400 Units by mouth daily. , Disp: , Rfl:     Coenzyme Q10 (CO Q 10 PO), Take 1 tablet by mouth daily.   , Disp: , Rfl: No Known Allergies  Social History     Socioeconomic History    Marital status:      Spouse name: Not on file    Number of children: Not on file    Years of education: Not on file    Highest education level: Not on file   Occupational History    Not on file   Social Needs    Financial resource strain: Not on file    Food insecurity     Worry: Not on file     Inability: Not on file    Transportation needs     Medical: Not on file     Non-medical: Not on file   Tobacco Use    Smoking status: Never Smoker    Smokeless tobacco: Never Used   Substance and Sexual Activity    Alcohol use:  Yes     Alcohol/week: 1.0 standard drinks     Types: 1 Glasses of wine per week     Comment: socially    Drug use: No    Sexual activity: Not on file   Lifestyle    Physical activity     Days per week: Not on file     Minutes per session: Not on file    Stress: Not on file   Relationships    Social connections     Talks on phone: Not on file     Gets together: Not on file     Attends Jainism service: Not on file     Active member of club or organization: Not on file     Attends meetings of clubs or organizations: Not on file     Relationship status: Not on file    Intimate partner violence     Fear of current or ex partner: Not on file     Emotionally abused: Not on file     Physically abused: Not on file     Forced sexual activity: Not on file   Other Topics Concern    Not on file   Social History Narrative        OA    P HYSTER    OBESITY    LIPID    BILAT TOTAL KNEE CCF     MILD T R    TICS    LLL PNEUMONIA 11-03    L5 S1 NARROWING    COLON----2-04 5 YRS    BASAL CELL CA R SHOUDLER    L-5----S-1 ADVANCED NARROWING---- 4-09    KITCHEN AT [facility] [address]  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 9-17---REFERRED BACK TO DR Trupti Little    NON HEALING LEFT DISTAL FIBULA AVULSION FX 7-18    L KNEE PAIN DR Saskia Lynn SENT TO DR Daya Spence 1-19 AND TOLD NEEDS REPLACEMENT---PT PLANS JUNE 2019    Neg heart cath   6-19      Past Medical History:   Diagnosis Date    Anxiety     Basal cell carcinoma (BCC) of right shoulder     Chronic back pain     Closed avulsion fracture of distal fibula with delayed healing, left     Degenerative joint disease involving multiple joints     Diverticulitis     Hyperlipidemia     Hypertension     Essential hypertension    Liver disease     LLL pneumonia (HCC)     Obesity     Osteoarthritis     Palpitations 9/17/2012    Sleep apnea     Tricuspid regurgitation     Trochanteric bursitis of right hip     Varicose veins of both lower extremities      Family History   Problem Relation Age of Onset    Lung Cancer Father     Thyroid Disease Sister       Past Surgical History:   Procedure Laterality Date    BREAST SURGERY  1990    cyst lt breast    CARDIAC CATHETERIZATION  06/19/2019    dr Keren Suero  5/2012    [place] REPLACEMENT  2001    L knee    JOINT REPLACEMENT  2001    rt knee      Vitals:    08/25/20 1433   BP: 135/82   Temp: 97.8 °F (36.6 °C)   TempSrc: Temporal   Weight: Comment: pt refused       Objective:    Physical Exam  Vitals signs reviewed. Constitutional:       Appearance: She is well-developed.    HENT:

## 2020-08-26 ENCOUNTER — TELEPHONE (OUTPATIENT)
Dept: PRIMARY CARE CLINIC | Age: 70
End: 2020-08-26

## 2020-08-27 NOTE — TELEPHONE ENCOUNTER
Pt called reporting she is home and prefers to be reached at  [phone]; will be watching for the call.

## 2020-09-09 ENCOUNTER — TELEPHONE (OUTPATIENT)
Dept: PRIMARY CARE CLINIC | Age: 70
End: 2020-09-09

## 2020-09-09 NOTE — TELEPHONE ENCOUNTER
Patient calling states the meloxicam is working for her back but her side is still painful in the rib area. Wanting to know your recommendations. [facility] [address]

## 2020-09-10 ENCOUNTER — TELEPHONE (OUTPATIENT)
Dept: PRIMARY CARE CLINIC | Age: 70
End: 2020-09-10

## 2020-09-10 NOTE — TELEPHONE ENCOUNTER
You placed a referral for the pt to see Dr. Roney Soares's M17.12 (ICD-10-CM) - Primary osteoarthritis of left knee.  He told his office to call you and inform you that he is not doing revisions, so the patient will have to go to the [facility] or to the [facility]

## 2020-09-11 NOTE — TELEPHONE ENCOUNTER
Let Dr. Tyson Otoole office know that she has never had surgery done before on that knee and see if they will see her

## 2020-09-11 NOTE — TELEPHONE ENCOUNTER
Asked the patient. If she had left knee surgery already done. I cannot remember but I do not think she has. .  Let me know please

## 2020-09-11 NOTE — TELEPHONE ENCOUNTER
The x-ray of the ribs did not show a fracture but sometimes they do not show up. I put another order in for another set of x-rays have her do that today or Monday and we will let her know the results.   If still not better in a week or 2 let me know

## 2020-09-12 ENCOUNTER — TELEPHONE (OUTPATIENT)
Dept: PRIMARY CARE CLINIC | Age: 70
End: 2020-09-12

## 2020-09-12 NOTE — TELEPHONE ENCOUNTER
Let patient know that the x-ray still showed no fracture. If she not better in a week I need to see her.

## 2020-09-18 ENCOUNTER — OFFICE VISIT (OUTPATIENT)
Dept: PRIMARY CARE CLINIC | Age: 70
End: 2020-09-18
Payer: MEDICARE

## 2020-09-18 VITALS
WEIGHT: 261 LBS | BODY MASS INDEX: 43.43 KG/M2 | OXYGEN SATURATION: 96 % | DIASTOLIC BLOOD PRESSURE: 83 MMHG | TEMPERATURE: 97.3 F | HEART RATE: 81 BPM | SYSTOLIC BLOOD PRESSURE: 138 MMHG

## 2020-09-18 PROCEDURE — 1036F TOBACCO NON-USER: CPT | Performed by: FAMILY MEDICINE

## 2020-09-18 PROCEDURE — G9899 SCRN MAM PERF RSLTS DOC: HCPCS | Performed by: FAMILY MEDICINE

## 2020-09-18 PROCEDURE — G8417 CALC BMI ABV UP PARAM F/U: HCPCS | Performed by: FAMILY MEDICINE

## 2020-09-18 PROCEDURE — 4040F PNEUMOC VAC/ADMIN/RCVD: CPT | Performed by: FAMILY MEDICINE

## 2020-09-18 PROCEDURE — 3017F COLORECTAL CA SCREEN DOC REV: CPT | Performed by: FAMILY MEDICINE

## 2020-09-18 PROCEDURE — G8399 PT W/DXA RESULTS DOCUMENT: HCPCS | Performed by: FAMILY MEDICINE

## 2020-09-18 PROCEDURE — G8427 DOCREV CUR MEDS BY ELIG CLIN: HCPCS | Performed by: FAMILY MEDICINE

## 2020-09-18 PROCEDURE — 1090F PRES/ABSN URINE INCON ASSESS: CPT | Performed by: FAMILY MEDICINE

## 2020-09-18 PROCEDURE — 1123F ACP DISCUSS/DSCN MKR DOCD: CPT | Performed by: FAMILY MEDICINE

## 2020-09-18 PROCEDURE — 99213 OFFICE O/P EST LOW 20 MIN: CPT | Performed by: FAMILY MEDICINE

## 2020-09-18 RX ORDER — BUPROPION HYDROCHLORIDE 300 MG/1
300 TABLET ORAL EVERY MORNING
Qty: 90 TABLET | Refills: 5 | Status: SHIPPED
Start: 2020-09-18 | End: 2021-07-27 | Stop reason: SDUPTHER

## 2020-09-18 RX ORDER — SIMVASTATIN 40 MG
40 TABLET ORAL NIGHTLY
Qty: 90 TABLET | Refills: 5 | Status: SHIPPED
Start: 2020-09-18 | End: 2021-07-27 | Stop reason: SDUPTHER

## 2020-09-18 RX ORDER — PREDNISONE 10 MG/1
TABLET ORAL
Qty: 18 TABLET | Refills: 0 | Status: SHIPPED
Start: 2020-09-18 | End: 2020-10-27 | Stop reason: ALTCHOICE

## 2020-09-18 ASSESSMENT — ENCOUNTER SYMPTOMS
GASTROINTESTINAL NEGATIVE: 1
RESPIRATORY NEGATIVE: 1
ALLERGIC/IMMUNOLOGIC NEGATIVE: 1
BACK PAIN: 1
EYES NEGATIVE: 1

## 2020-09-18 NOTE — PROGRESS NOTES
20  Name: Gypsy Sample    : 1950    Sex: female    Age: 68 y.o. Subjective:  Chief Complaint: Patient is here for  pain     Left knee pain  Dr Henrine Landau not see  Re    Not do re do    Right mid  Lat  Back pain          X ray done and neg  wose with bend and twist      Review of Systems   Constitutional: Negative. HENT: Negative. Eyes: Negative. Respiratory: Negative. Cardiovascular: Negative. Gastrointestinal: Negative. Endocrine: Negative. Genitourinary: Negative. Musculoskeletal: Positive for back pain. Skin: Negative. Allergic/Immunologic: Negative. Neurological: Negative. Hematological: Negative. Psychiatric/Behavioral: Negative. Current Outpatient Medications:     predniSONE (DELTASONE) 10 MG tablet, ONE TID FOR THREE DAYS, ONE BID FOR THREE DAYS, ONE QD FOR THREE DAYS   FOOD, Disp: 18 tablet, Rfl: 0    meloxicam (MOBIC) 7.5 MG tablet, Take 1 tablet by mouth daily ---food for arthritis, Disp: 30 tablet, Rfl: 1    simvastatin (ZOCOR) 40 MG tablet, Take 1 tablet by mouth nightly, Disp: 90 tablet, Rfl: 5    buPROPion (WELLBUTRIN XL) 300 MG extended release tablet, Take 1 tablet by mouth every morning, Disp: 90 tablet, Rfl: 5    augmented betamethasone dipropionate (DIPROLENE AF) 0.05 % cream, Apply topically 2 times daily Apply topically 2 times daily. , Disp: , Rfl:     hydrocortisone 2.5 % cream, Apply topically 2 times daily Apply topically 2 times daily. , Disp: , Rfl:     aspirin-acetaminophen-caffeine (EXCEDRIN MIGRAINE) 250-250-65 MG per tablet, Take 1 tablet by mouth every 6 hours as needed for Headaches, Disp: , Rfl:     vitamin D (CHOLECALCIFEROL) 5000 UNITS CAPS capsule, Take 5,000 Units by mouth daily, Disp: , Rfl:     vitamin E 400 UNIT capsule, Take 400 Units by mouth daily. , Disp: , Rfl:     Coenzyme Q10 (CO Q 10 PO), Take 1 tablet by mouth daily.   , Disp: , Rfl:   No Known Allergies  Social History     Socioeconomic History    Marital status:      Spouse name: Not on file    Number of children: Not on file    Years of education: Not on file    Highest education level: Not on file   Occupational History    Not on file   Social Needs    Financial resource strain: Not on file    Food insecurity     Worry: Not on file     Inability: Not on file    Transportation needs     Medical: Not on file     Non-medical: Not on file   Tobacco Use    Smoking status: Never Smoker    Smokeless tobacco: Never Used   Substance and Sexual Activity    Alcohol use:  Yes     Alcohol/week: 1.0 standard drinks     Types: 1 Glasses of wine per week     Comment: socially    Drug use: No    Sexual activity: Not on file   Lifestyle    Physical activity     Days per week: Not on file     Minutes per session: Not on file    Stress: Not on file   Relationships    Social connections     Talks on phone: Not on file     Gets together: Not on file     Attends Yarsanism service: Not on file     Active member of club or organization: Not on file     Attends meetings of clubs or organizations: Not on file     Relationship status: Not on file    Intimate partner violence     Fear of current or ex partner: Not on file     Emotionally abused: Not on file     Physically abused: Not on file     Forced sexual activity: Not on file   Other Topics Concern    Not on file   Social History Narrative        OA    P HYSTER    OBESITY    LIPID    BILAT TOTAL KNEE [facility]     MILD T R    TICS    LLL PNEUMONIA 11-03    L5 S1 NARROWING    COLON----2-04 5 YRS    BASAL CELL CA R SHOUDLER    L-5----S-1 ADVANCED NARROWING---- 4-09    KITCHEN AT [facility] [address]  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 9-17---REFERRED BACK TO DR Nicky Ross    NON HEALING LEFT DISTAL FIBULA AVULSION FX 7-18    L KNEE PAIN DR Helen Lucas SENT TO DR Maria Dolores Jamil 1-19 AND TOLD NEEDS REPLACEMENT---PT PLANS JUNE 2019    Neg heart cath   6-19      Past Medical History:   Diagnosis Date    Anxiety     Basal cell carcinoma (BCC) of right shoulder     Chronic back pain     Closed avulsion fracture of distal fibula with delayed healing, left     Degenerative joint disease involving multiple joints     Diverticulitis     Hyperlipidemia     Hypertension     Essential hypertension    Liver disease     LLL pneumonia (HCC)     Obesity     Osteoarthritis     Palpitations 9/17/2012    Sleep apnea     Tricuspid regurgitation     Trochanteric bursitis of right hip     Varicose veins of both lower extremities      Family History   Problem Relation Age of Onset    Lung Cancer Father     Thyroid Disease Sister       Past Surgical History:   Procedure Laterality Date    BREAST SURGERY  1990    cyst lt breast    CARDIAC CATHETERIZATION  06/19/2019    dr Nikky Barreto  5/2012    HYSTERECTOMY  1980    JOINT REPLACEMENT  2001    L knee    JOINT REPLACEMENT  2001    rt knee      Vitals:    09/18/20 1315   BP: 138/83   Pulse: 81   Temp: 97.3 °F (36.3 °C)   SpO2: 96%   Weight: 261 lb (118.4 kg)       Objective:    Physical Exam  Vitals signs reviewed. Constitutional:       Appearance: She is well-developed. HENT:      Head: Normocephalic.    Eyes:      Pupils: Pupils are equal, round, and reactive to light. Neck:      Musculoskeletal: Normal range of motion. Cardiovascular:      Rate and Rhythm: Normal rate and regular rhythm. Pulmonary:      Effort: Pulmonary effort is normal.      Breath sounds: Normal breath sounds. Abdominal:      Palpations: Abdomen is soft. Musculoskeletal:      Comments: Right lat back with spasm with reproduced with side bending   Skin:     General: Skin is warm. Neurological:      Mental Status: She is alert and oriented to person, place, and time. Psychiatric:         Behavior: Behavior normal.         Stella Hatfield was seen today for back pain and medication refill. Diagnoses and all orders for this visit:    Acute right-sided low back pain without sciatica  -     External Referral To Pain Clinic  -     predniSONE (DELTASONE) 10 MG tablet; ONE TID FOR THREE DAYS, ONE BID FOR THREE DAYS, ONE QD FOR THREE DAYS   FOOD    Essential hypertension    Primary osteoarthritis of left knee  -     Yesenia - Barber Fung MD, Orthopaedics, [facility]        Comments: appt orhto  Re knee and appt   Backdoc   Depo    A great deal of time spent reviewing medications, diet, exercise, social issues. Also reviewing the chart before entering the room with patient and finishing charting after leaving patient's room. More than half of that time was spent face to face with the patient in counseling and coordinating care.       Follow Up: Return for See   two  Referral.     Seen by:  Jose Paulino DO

## 2020-10-27 ENCOUNTER — OFFICE VISIT (OUTPATIENT)
Dept: ORTHOPEDIC SURGERY | Age: 70
End: 2020-10-27
Payer: MEDICARE

## 2020-10-27 ENCOUNTER — HOSPITAL ENCOUNTER (OUTPATIENT)
Dept: GENERAL RADIOLOGY | Age: 70
Discharge: HOME | End: 2020-10-29
Payer: MEDICARE

## 2020-10-27 ENCOUNTER — HOSPITAL ENCOUNTER (OUTPATIENT)
Age: 70
Discharge: HOME | End: 2020-10-27
Payer: MEDICARE

## 2020-10-27 VITALS — DIASTOLIC BLOOD PRESSURE: 76 MMHG | SYSTOLIC BLOOD PRESSURE: 138 MMHG | HEART RATE: 83 BPM | TEMPERATURE: 97.6 F

## 2020-10-27 LAB
C-REACTIVE PROTEIN: 1.3 MG/DL (ref 0–0.4)
SEDIMENTATION RATE, ERYTHROCYTE: 22 MM/HR (ref 0–20)

## 2020-10-27 PROCEDURE — G9899 SCRN MAM PERF RSLTS DOC: HCPCS | Performed by: ORTHOPAEDIC SURGERY

## 2020-10-27 PROCEDURE — 86140 C-REACTIVE PROTEIN: CPT

## 2020-10-27 PROCEDURE — 1090F PRES/ABSN URINE INCON ASSESS: CPT | Performed by: ORTHOPAEDIC SURGERY

## 2020-10-27 PROCEDURE — 1036F TOBACCO NON-USER: CPT | Performed by: ORTHOPAEDIC SURGERY

## 2020-10-27 PROCEDURE — 36415 COLL VENOUS BLD VENIPUNCTURE: CPT

## 2020-10-27 PROCEDURE — 99212 OFFICE O/P EST SF 10 MIN: CPT

## 2020-10-27 PROCEDURE — G8417 CALC BMI ABV UP PARAM F/U: HCPCS | Performed by: ORTHOPAEDIC SURGERY

## 2020-10-27 PROCEDURE — 99204 OFFICE O/P NEW MOD 45 MIN: CPT | Performed by: ORTHOPAEDIC SURGERY

## 2020-10-27 PROCEDURE — 73562 X-RAY EXAM OF KNEE 3: CPT

## 2020-10-27 PROCEDURE — 4040F PNEUMOC VAC/ADMIN/RCVD: CPT | Performed by: ORTHOPAEDIC SURGERY

## 2020-10-27 PROCEDURE — 85651 RBC SED RATE NONAUTOMATED: CPT

## 2020-10-27 PROCEDURE — 87040 BLOOD CULTURE FOR BACTERIA: CPT

## 2020-10-27 PROCEDURE — G8427 DOCREV CUR MEDS BY ELIG CLIN: HCPCS | Performed by: ORTHOPAEDIC SURGERY

## 2020-10-27 PROCEDURE — 1123F ACP DISCUSS/DSCN MKR DOCD: CPT | Performed by: ORTHOPAEDIC SURGERY

## 2020-10-27 PROCEDURE — 3017F COLORECTAL CA SCREEN DOC REV: CPT | Performed by: ORTHOPAEDIC SURGERY

## 2020-10-27 PROCEDURE — G8484 FLU IMMUNIZE NO ADMIN: HCPCS | Performed by: ORTHOPAEDIC SURGERY

## 2020-10-27 PROCEDURE — G8399 PT W/DXA RESULTS DOCUMENT: HCPCS | Performed by: ORTHOPAEDIC SURGERY

## 2020-10-27 NOTE — PROGRESS NOTES
Chief Complaint   Patient presents with    New Patient     Lft knee TKA 2001. Denies new injury or fall prior to onset of intermittant pain for two years. Has utilized cane for times when the knee \"gave out\". States she did some of her knee exercises and it has helped. Pain is now lasting longer, but not constant. States she has \"red streak on lateral aspect of Left knee\" which started a few weeks ago. Tender \"at top\" and sometimes the streak is distal patellar area. Difficulty with steps, transfers and HS.  Other     Feels she might need another replacement. 5/10 most days. Advil can help sometimes for hte pain. SUBJECTIVE: Patient is a very pleasant 27-year-old female comes in with a chief complaint of left knee pain. She states she had a total knee done in 2001 by another surgeon. She states about a year ago she started to have pain and some buckling. Over time she actually was able to get back to function. She still has significant pain in the knee pretty much every day. It is starting to affect her ADLs. She has been worked up by her original surgeon with a aspiration of the joint due to what appeared to be loosening. This came back with no bacteria. She saw another surgeon and he got a dry tap. He also recommended revision for loosening of components. She then decided to come here to see me today for definitive treatment. She denies any recent falls or traumas. She denies any recent fevers, chills, weight loss or malaise. Review of Systems   Constitutional: Negative for fever, chills, diaphoresis, appetite change and fatigue. HENT: Negative for dental issues, hearing loss and tinnitus. Negative for congestion, sinus pressure, sneezing, sore throat. Negative for headache. Eyes: Negative for visual disturbance, blurred and double vision. Negative for pain, discharge, redness and itching  Respiratory: Negative for cough, shortness of breath and wheezing.    Cardiovascular: Negative for chest pain, palpitations and leg swelling. No dyspnea on exertion   Gastrointestinal:   Negative for nausea, vomiting, abdominal pain, diarrhea, constipation  or black or bloody. Hematologic\Lymphatic:  negative for bleeding, petechiae,   Genitourinary: Negative for hematuria and difficulty urinating. Musculoskeletal: Negative for neck pain and stiffness. Negative for back pain, see HPI  Skin: Negative for pallor, rash and wound. Neurological: Negative for dizziness, tremors, seizures, weakness, light-headedness, no TIA or stroke symptoms. No numbness and headaches. Psychiatric/Behavioral: Negative. OBJECTIVE:      Physical Examination:   General appearance: alert, well appearing, and in no distress, over weight. No visible signs of trauma   Mental status: alert, oriented to person, place, and time, normal mood, behavior, speech, dress, motor activity, and thought processes  Abdomen: soft, nondistended  Resp:   resp easy and unlabored, no audible wheezes note, normal symmetrical expansion of both hemithoraces  Cardiac: distal pulses palpable, skin and extremities well perfused  Neurological: alert, oriented X3, normal speech, no focal findings or movement disorder noted, motor and sensory grossly normal bilaterally, normal muscle tone, no tremors, strength 5/5, normal gait and station  HEENT: normochephalic atraumatic, external ears and eyes normal, sclera normal, neck supple, no nasal discharge. Extremities:   peripheral pulses normal, no edema, redness or tenderness in the calves   Skin: normal coloration, no rashes or open wounds, no suspicious skin lesions noted  Psych: Affect euthymic   Musculoskeletal:   Extremity:  Left knee   Skin intact. He is incision well-healed   Very mild warmth   No other signs of infection   No effusion noted. Global TTP. Stable to varus and valgus at 30 degrees of flexion. Negative Lachman's and posterior drawer.      Compartments soft and compressible. NVID. 2/4 DP and PT pulses. Chemo tracks normally   Calves soft and NT.     5/5 EHL, TA, GS. Range of motion is 0 to 95 degrees      /76 (Site: Left Upper Arm, Position: Sitting)   Pulse 83   Temp 97.6 °F (36.4 °C) (Oral)      XR: 10/27/20 x-rays taken today show a lucency especially under the medial side of the tibial component and on the anterior portion of the femoral component. It appears to be loosening. Unable to help with aseptic or septic loosening. Otherwise knee is well aligned. ASSESSMENT:     Diagnosis Orders   1. 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 I think her best course of treatment with her having multiple taps already would be an ESR, CRP, and blood cultures. If these are negative we can probably set her up for revision of the knee for aseptic loosening. I did explain to her in detail that if there are white blood cells on frozen section or any signs of infection we will place an articulating antibiotic spacer. If not we would place the revision knee for loosening and take cultures as well. If those cultures are negative we would then continue on as if we did a normal revision knee, if they are positive we would involve infectious disease she would most likely get a PICC line and antibiotic therapy. Once you have an infected knee you are always at risk for an amputation she understands that as well. You are also at risk for not be able to get a component to stand without getting infected which could lead for the knee fusion or an amputation. We talked about all these things in detail including her body habitus with a BMI of 43.43 kg/m². This puts her at high risk for infection soft tissue healing and infection as well.   She understands and if possible like to wait till for the holidays for surgery which I think would be reasonable if it is just aseptic loosening versus any of her infection labs being elevated. PLAN:  ESR, CRP, and blood cultures    We will make our next decision based off of these    We will likely need a revision knee versus explantation in the future      Spent 45 to 50 minutes on patient visit today discussing potential outcomes, testing, expectations and risk to this difficult problem.       ELECTRONICALLY signed by:    Lolis Alvarado MD  10/27/20

## 2020-11-01 LAB — CULTURE, BLOOD 2: NORMAL

## 2020-11-19 ENCOUNTER — TELEPHONE (OUTPATIENT)
Dept: PRIMARY CARE CLINIC | Age: 70
End: 2020-11-19

## 2021-01-20 ENCOUNTER — OFFICE VISIT (OUTPATIENT)
Dept: ORTHOPEDIC SURGERY | Age: 71
End: 2021-01-20
Payer: MEDICARE

## 2021-01-20 VITALS — TEMPERATURE: 98.5 F

## 2021-01-20 DIAGNOSIS — T84.033A LOOSENING OF PROSTHESIS OF LEFT TOTAL KNEE REPLACEMENT, INITIAL ENCOUNTER (HCC): Primary | ICD-10-CM

## 2021-01-20 PROCEDURE — 20610 DRAIN/INJ JOINT/BURSA W/O US: CPT | Performed by: ORTHOPAEDIC SURGERY

## 2021-01-20 PROCEDURE — G8399 PT W/DXA RESULTS DOCUMENT: HCPCS | Performed by: ORTHOPAEDIC SURGERY

## 2021-01-20 PROCEDURE — 4040F PNEUMOC VAC/ADMIN/RCVD: CPT | Performed by: ORTHOPAEDIC SURGERY

## 2021-01-20 PROCEDURE — 3017F COLORECTAL CA SCREEN DOC REV: CPT | Performed by: ORTHOPAEDIC SURGERY

## 2021-01-20 PROCEDURE — 1036F TOBACCO NON-USER: CPT | Performed by: ORTHOPAEDIC SURGERY

## 2021-01-20 PROCEDURE — G8484 FLU IMMUNIZE NO ADMIN: HCPCS | Performed by: ORTHOPAEDIC SURGERY

## 2021-01-20 PROCEDURE — 1123F ACP DISCUSS/DSCN MKR DOCD: CPT | Performed by: ORTHOPAEDIC SURGERY

## 2021-01-20 PROCEDURE — 99215 OFFICE O/P EST HI 40 MIN: CPT | Performed by: ORTHOPAEDIC SURGERY

## 2021-01-20 PROCEDURE — G8417 CALC BMI ABV UP PARAM F/U: HCPCS | Performed by: ORTHOPAEDIC SURGERY

## 2021-01-20 PROCEDURE — 1090F PRES/ABSN URINE INCON ASSESS: CPT | Performed by: ORTHOPAEDIC SURGERY

## 2021-01-20 PROCEDURE — G8427 DOCREV CUR MEDS BY ELIG CLIN: HCPCS | Performed by: ORTHOPAEDIC SURGERY

## 2021-01-20 PROCEDURE — 99212 OFFICE O/P EST SF 10 MIN: CPT

## 2021-01-20 NOTE — PROGRESS NOTES
Chief Complaint   Patient presents with    Knee Pain     discuss L knee revision vs explant    Results     blood cultures, ESR, CRP       SUBJECTIVE: Patient is a very pleasant 49-year-old female came in to see me a while back for left knee pain. She had had a total knee done by another surgeon 2001. She had work-up at that point to loosening. She has had a tap and a dry tap in the past.  Neither which including her blood cultures have grown any bacteria. She appears to be aseptic loosening even clinically. She has had no fevers, chills, or feelings of illness. Our plan was to attempt another tap today and get her set up for revision versus antibiotic spacer placement. We talked in detail about the fact that she could have issues after this revision as well and she understands that is at the point that she is having difficulty with her ADLs and want something done. She had an ESR and CRP done which were 22 and 1.3 respectively. These have been stable over the years and are just mildly elevated. Otherwise she denies any further symptoms except for the pain. Review of Systems   Constitutional: Negative for fever, chills, diaphoresis, appetite change and fatigue. HENT: Negative for dental issues, hearing loss and tinnitus. Negative for congestion, sinus pressure, sneezing, sore throat. Negative for headache. Eyes: Negative for visual disturbance, blurred and double vision. Negative for pain, discharge, redness and itching  Respiratory: Negative for cough, shortness of breath and wheezing. Cardiovascular: Negative for chest pain, palpitations and leg swelling. No dyspnea on exertion   Gastrointestinal:   Negative for nausea, vomiting, abdominal pain, diarrhea, constipation  or black or bloody. Hematologic\Lymphatic:  negative for bleeding, petechiae,   Genitourinary: Negative for hematuria and difficulty urinating. Musculoskeletal: Negative for neck pain and stiffness. Negative for back pain, see HPI  Skin: Negative for pallor, rash and wound. Neurological: Negative for dizziness, tremors, seizures, weakness, light-headedness, no TIA or stroke symptoms. No numbness and headaches. Psychiatric/Behavioral: Negative. Past Medical History:   Diagnosis Date    Anxiety     Basal cell carcinoma (BCC) of right shoulder     Chronic back pain     Closed avulsion fracture of distal fibula with delayed healing, left     Degenerative joint disease involving multiple joints     Diverticulitis     Hyperlipidemia     Hypertension     Essential hypertension    Liver disease     LLL pneumonia     Obesity     Osteoarthritis     Palpitations 9/17/2012    Sleep apnea     Tricuspid regurgitation     Trochanteric bursitis of right hip     Varicose veins of both lower extremities        Past Surgical History:   Procedure Laterality Date    BREAST SURGERY  1990    cyst lt breast    CARDIAC CATHETERIZATION  06/19/2019    dr Anayeli Dave  5/2012    HYSTERECTOMY  1980    JOINT REPLACEMENT  2001    L knee    JOINT REPLACEMENT  2001    rt knee     Family History   Problem Relation Age of Onset    Lung Cancer Father     Thyroid Disease Sister      Social History     Tobacco Use    Smoking status: Never Smoker    Smokeless tobacco: Never Used   Substance Use Topics    Alcohol use: Yes     Alcohol/week: 1.0 standard drinks     Types: 1 Glasses of wine per week     Comment: socially    Drug use: No       OBJECTIVE:      Physical Examination:   General appearance: alert, well appearing, and in no distress, over weight.  No visible signs of trauma   Mental status: alert, oriented to person, place, and time, normal mood, behavior, speech, dress, motor activity, and thought processes  Abdomen: soft, nondistended

## 2021-01-20 NOTE — PATIENT INSTRUCTIONS
You will need to be medically cleared before surgery by your family doctor. Please call your doctor to set up an appointment for medical clearance as soon as possible and have the office fax your medical clearance to :   [facility] [phone]   ATTN:  Gideon Fontana    ? Your surgery is scheduled for Monday 2/22/21 at 9:00 AM  with Dr. Yifan Mendoza MD at the main [address] [place] [address] on [facility] in [facility] . You will need to report to Preop area  that morning at 7:00 AM  ? Preadmission Testing (PAT) department at [facility] will contact you with all the details prior to surgery. ? Nothing to eat or drink after midnight the night before surgery. You may take a pain pill and any other medicine PAT instructs you to take with small sip of water if needed. ? Continue with ice and elevation to reduce swelling  ? Weight bearing as tolerated right lower extremity   ? Take pain medicine as instructed  ? Hold Aspirin/Lovenox the day of surgery. Hold all NSAIDs 7 days prior to surgery    Call office with any question or concerns: [phone]    All surgical patients will be gradually tapered off narcotic pain medication post operatively, this office will not continue narcotics longer than 12-weeks (90 days) from date of surgery. If narcotics are required longer than this time period you will be referred to pain management. OUTPATIENT ORTHOPEDIC SURGERY  PRE-OP COVID TESTING    ? We need to have COVID-19 Testing done 4 days (not including Sunday) prior to your scheduled surgery    ? After your testing is completed you will need to Self Quarantine until date of surgery    ?  If your surgery is scheduled for:  Monday- Testing needs to be done Wednesday Tuesday- Testing needs to be done Thursday Wednesday- Testing needs to be done Friday Thursday- Testing needs to be done Friday Friday- Testing needs to be done Monday    Locations and hours are listed below: These sites will not test anyone without a physician order. The order can be in [facility] or can be a paper order. ? [address] [place], [facility] [place] [facility]. Hours of Operation  Monday  Friday 6:00am  2:30pm.   ? [address] [facility] [address] [facility], [address] [place] [address]. Hours of Operation  Monday  Friday 6:00am  2:30pm. ( [place] [id number])   ? Nia Sanderson  [address] [address], [address]. Hours of Operation  Monday  Friday 6:00am  2:30pm.     ? You will follow white signs that say SURGERY TESTING  ? Please bring a valid photo ID with you  ? Please bring order for COVID 19 test with you  ?  Pre-Admission Testing will also contact you to review COVID 19 testing and protocol

## 2021-01-20 NOTE — PROGRESS NOTES
Evangelina Edwards is a 65 y.o. female who presents for follow up of left knee    Date last seen in office: 10/27/20    Symptoms: worse  New complaints: knee \"giving out\", occasional bruising     Previous treatment from last visit blood work completed    [address] device No Device  Participating in therapy?  no

## 2021-01-26 ENCOUNTER — OFFICE VISIT (OUTPATIENT)
Dept: PRIMARY CARE CLINIC | Age: 71
End: 2021-01-26
Payer: MEDICARE

## 2021-01-26 VITALS
SYSTOLIC BLOOD PRESSURE: 138 MMHG | BODY MASS INDEX: 43.32 KG/M2 | HEIGHT: 65 IN | TEMPERATURE: 99.3 F | DIASTOLIC BLOOD PRESSURE: 83 MMHG | WEIGHT: 260 LBS

## 2021-01-26 DIAGNOSIS — E78.2 MIXED HYPERLIPIDEMIA: Chronic | ICD-10-CM

## 2021-01-26 DIAGNOSIS — R73.03 PRE-DIABETES: ICD-10-CM

## 2021-01-26 DIAGNOSIS — I10 ESSENTIAL HYPERTENSION: Chronic | ICD-10-CM

## 2021-01-26 DIAGNOSIS — Z01.818 PRE-OP EXAM: Primary | ICD-10-CM

## 2021-01-26 DIAGNOSIS — E66.01 CLASS 3 SEVERE OBESITY DUE TO EXCESS CALORIES WITH SERIOUS COMORBIDITY AND BODY MASS INDEX (BMI) OF 40.0 TO 44.9 IN ADULT (HCC): Chronic | ICD-10-CM

## 2021-01-26 DIAGNOSIS — M81.0 AGE-RELATED OSTEOPOROSIS WITHOUT CURRENT PATHOLOGICAL FRACTURE: ICD-10-CM

## 2021-01-26 LAB
ALBUMIN SERPL-MCNC: 4.4 G/DL (ref 3.5–5.2)
ALP BLD-CCNC: 121 U/L (ref 35–104)
ALT SERPL-CCNC: 19 U/L (ref 0–32)
ANION GAP SERPL CALCULATED.3IONS-SCNC: 13 MMOL/L (ref 7–16)
AST SERPL-CCNC: 22 U/L (ref 0–31)
BACTERIA: ABNORMAL /HPF
BASOPHILS ABSOLUTE: 0.03 E9/L (ref 0–0.2)
BASOPHILS RELATIVE PERCENT: 0.5 % (ref 0–2)
BILIRUB SERPL-MCNC: 0.7 MG/DL (ref 0–1.2)
BILIRUBIN URINE: NEGATIVE
BLOOD, URINE: NORMAL
BUN BLDV-MCNC: 13 MG/DL (ref 8–23)
CALCIUM SERPL-MCNC: 9.7 MG/DL (ref 8.6–10.2)
CHLORIDE BLD-SCNC: 103 MMOL/L (ref 98–107)
CHOLESTEROL, TOTAL: 172 MG/DL (ref 0–199)
CLARITY: CLEAR
CO2: 24 MMOL/L (ref 22–29)
COLOR: YELLOW
CREAT SERPL-MCNC: 0.8 MG/DL (ref 0.5–1)
EOSINOPHILS ABSOLUTE: 0.17 E9/L (ref 0.05–0.5)
EOSINOPHILS RELATIVE PERCENT: 2.8 % (ref 0–6)
EPITHELIAL CELLS, UA: ABNORMAL /HPF
GFR AFRICAN AMERICAN: >60
GFR NON-AFRICAN AMERICAN: >60 ML/MIN/1.73
GLUCOSE BLD-MCNC: 100 MG/DL (ref 74–99)
GLUCOSE URINE: NEGATIVE MG/DL
HBA1C MFR BLD: 5.4 % (ref 4–5.6)
HCT VFR BLD CALC: 44.1 % (ref 34–48)
HDLC SERPL-MCNC: 41 MG/DL
HEMOGLOBIN: 14.4 G/DL (ref 11.5–15.5)
IMMATURE GRANULOCYTES #: 0.03 E9/L
IMMATURE GRANULOCYTES %: 0.5 % (ref 0–5)
KETONES, URINE: NEGATIVE MG/DL
LDL CHOLESTEROL CALCULATED: 94 MG/DL (ref 0–99)
LEUKOCYTE ESTERASE, URINE: NEGATIVE
LYMPHOCYTES ABSOLUTE: 1.67 E9/L (ref 1.5–4)
LYMPHOCYTES RELATIVE PERCENT: 27.1 % (ref 20–42)
MCH RBC QN AUTO: 29.6 PG (ref 26–35)
MCHC RBC AUTO-ENTMCNC: 32.7 % (ref 32–34.5)
MCV RBC AUTO: 90.6 FL (ref 80–99.9)
MONOCYTES ABSOLUTE: 0.38 E9/L (ref 0.1–0.95)
MONOCYTES RELATIVE PERCENT: 6.2 % (ref 2–12)
NEUTROPHILS ABSOLUTE: 3.89 E9/L (ref 1.8–7.3)
NEUTROPHILS RELATIVE PERCENT: 62.9 % (ref 43–80)
NITRITE, URINE: NEGATIVE
PDW BLD-RTO: 12.6 FL (ref 11.5–15)
PH UA: 5.5 (ref 5–9)
PLATELET # BLD: 278 E9/L (ref 130–450)
PMV BLD AUTO: 11.2 FL (ref 7–12)
POTASSIUM SERPL-SCNC: 3.8 MMOL/L (ref 3.5–5)
PROTEIN UA: NEGATIVE MG/DL
RBC # BLD: 4.87 E12/L (ref 3.5–5.5)
RBC UA: ABNORMAL /HPF (ref 0–2)
SODIUM BLD-SCNC: 140 MMOL/L (ref 132–146)
SPECIFIC GRAVITY UA: 1.02 (ref 1–1.03)
TOTAL PROTEIN: 7.3 G/DL (ref 6.4–8.3)
TRIGL SERPL-MCNC: 185 MG/DL (ref 0–149)
UROBILINOGEN, URINE: 0.2 E.U./DL
VITAMIN D 25-HYDROXY: 31 NG/ML (ref 30–100)
VLDLC SERPL CALC-MCNC: 37 MG/DL
WBC # BLD: 6.2 E9/L (ref 4.5–11.5)
WBC UA: ABNORMAL /HPF (ref 0–5)

## 2021-01-26 PROCEDURE — 4040F PNEUMOC VAC/ADMIN/RCVD: CPT | Performed by: FAMILY MEDICINE

## 2021-01-26 PROCEDURE — 1123F ACP DISCUSS/DSCN MKR DOCD: CPT | Performed by: FAMILY MEDICINE

## 2021-01-26 PROCEDURE — G8428 CUR MEDS NOT DOCUMENT: HCPCS | Performed by: FAMILY MEDICINE

## 2021-01-26 PROCEDURE — 1090F PRES/ABSN URINE INCON ASSESS: CPT | Performed by: FAMILY MEDICINE

## 2021-01-26 PROCEDURE — G8484 FLU IMMUNIZE NO ADMIN: HCPCS | Performed by: FAMILY MEDICINE

## 2021-01-26 PROCEDURE — G8417 CALC BMI ABV UP PARAM F/U: HCPCS | Performed by: FAMILY MEDICINE

## 2021-01-26 PROCEDURE — 99214 OFFICE O/P EST MOD 30 MIN: CPT | Performed by: FAMILY MEDICINE

## 2021-01-26 PROCEDURE — G8399 PT W/DXA RESULTS DOCUMENT: HCPCS | Performed by: FAMILY MEDICINE

## 2021-01-26 PROCEDURE — 3017F COLORECTAL CA SCREEN DOC REV: CPT | Performed by: FAMILY MEDICINE

## 2021-01-26 PROCEDURE — 1036F TOBACCO NON-USER: CPT | Performed by: FAMILY MEDICINE

## 2021-01-26 PROCEDURE — 93000 ELECTROCARDIOGRAM COMPLETE: CPT | Performed by: FAMILY MEDICINE

## 2021-01-26 ASSESSMENT — PATIENT HEALTH QUESTIONNAIRE - PHQ9
1. LITTLE INTEREST OR PLEASURE IN DOING THINGS: 0
SUM OF ALL RESPONSES TO PHQ9 QUESTIONS 1 & 2: 0
2. FEELING DOWN, DEPRESSED OR HOPELESS: 0

## 2021-01-26 ASSESSMENT — ENCOUNTER SYMPTOMS
ALLERGIC/IMMUNOLOGIC NEGATIVE: 1
GASTROINTESTINAL NEGATIVE: 1
EYES NEGATIVE: 1
RESPIRATORY NEGATIVE: 1

## 2021-01-26 NOTE — PROGRESS NOTES
21  Name: Chance Novoa    : 1950    Sex: female    Age: 65 y.o. Subjective:  Chief Complaint: Patient is here for 6 mo ck   Re    Re    Chol lab  Wt and also pre op clerane fo rleft knee  Re do         Feels ok  No  Cp ro sob    Ros neg    faield  To get lab done will do tdy  ekg no acute chg      Review of Systems   Constitutional: Negative. HENT: Negative. Eyes: Negative. Respiratory: Negative. Cardiovascular: Negative. Gastrointestinal: Negative. Endocrine: Negative. Genitourinary: Negative. Musculoskeletal: Negative. See  hpi   Skin: Negative. Allergic/Immunologic: Negative. Neurological: Negative. Hematological: Negative. Psychiatric/Behavioral: Negative. Current Outpatient Medications:     Multiple Vitamins-Minerals (CENTRUM SILVER 50+WOMEN PO), Take by mouth, Disp: , Rfl:     buPROPion (WELLBUTRIN XL) 300 MG extended release tablet, Take 1 tablet by mouth every morning, Disp: 90 tablet, Rfl: 5    simvastatin (ZOCOR) 40 MG tablet, Take 1 tablet by mouth nightly, Disp: 90 tablet, Rfl: 5    aspirin-acetaminophen-caffeine (EXCEDRIN MIGRAINE) 250-250-65 MG per tablet, Take 1 tablet by mouth every 6 hours as needed for Headaches, Disp: , Rfl:     vitamin D (CHOLECALCIFEROL) 5000 UNITS CAPS capsule, Take 5,000 Units by mouth daily, Disp: , Rfl:     vitamin E 400 UNIT capsule, Take 400 Units by mouth daily. , Disp: , Rfl:     Coenzyme Q10 (CO Q 10 PO), Take 1 tablet by mouth daily.   , Disp: , Rfl:   No Known Allergies  Social History     Socioeconomic History    Marital status:      Spouse name: Not on file    Number of children: Not on file    Years of education: Not on file    Highest education level: Not on file   Occupational History    Not on file   Social Needs    Financial resource strain: Not on file    Food insecurity     Worry: Not on file     Inability: Not on file   Ivaldi needs Medical: Not on file     Non-medical: Not on file   Tobacco Use    Smoking status: Never Smoker    Smokeless tobacco: Never Used   Substance and Sexual Activity    Alcohol use:  Yes     Alcohol/week: 1.0 standard drinks     Types: 1 Glasses of wine per week     Comment: socially    Drug use: No    Sexual activity: Not on file   Lifestyle    Physical activity     Days per week: Not on file     Minutes per session: Not on file    Stress: Not on file   Relationships    Social connections     Talks on phone: Not on file     Gets together: Not on file     Attends Sikh service: Not on file     Active member of club or organization: Not on file     Attends meetings of clubs or organizations: Not on file     Relationship status: Not on file    Intimate partner violence     Fear of current or ex partner: Not on file     Emotionally abused: Not on file     Physically abused: Not on file     Forced sexual activity: Not on file   Other Topics Concern    Not on file   Social History Narrative        OA    P HYSTER    OBESITY    LIPID    BILAT TOTAL KNEE [facility]     MILD T R    TICS    LLL PNEUMONIA 11-03    L5 S1 NARROWING    COLON----2-04 5 YRS    BASAL CELL CA R SHOUDLER    L-5----S-1 ADVANCED NARROWING---- 4-09    KITCHEN AT [facility] [address]  1950 Page #2    COLON 3-12 DUNCH---10 YRS    LLL PNEUMONIA 3-12    TMT ABNORMAL  AND SEEN BY DR Dang Morse AND SAID CHG LIFESTYLE AND REDUCE RISK OR LIKELY    VAS DIS IN FUTURE    RETIRE END MAY 2-    SLEEP STUDY 5-14 ONLY MILE SLEEP APNEA    OA LUMBAR SPINE AND OA HIPS---1-15    LEFT TMJ 4-15    TWO SONS----GRAYBAR AND CONSTUCION CO    ONE DARYL--WORKS CO  OF COURTS---NOT     LOW BACK PAIN--INJECTIONS DR Racquel Pool 2016    INFLU A 3-17    OA R > L HIPS AND LUMBAR SPINE-- AND GB STONES----     GB OUT --WHO SAID LIVER WAS VERY FATTY DURING SURGERY AND ADVISED AGGRESSIVE WT LOSS    FATTY LIVER --- [address] PT 1-18 Comments: Dec rom left knee   Skin:     General: Skin is warm. Neurological:      Mental Status: She is alert and oriented to person, place, and time. Psychiatric:         Behavior: Behavior normal.         Deb Farooq was seen today for pre-op exam.    Diagnoses and all orders for this visit:    Pre-op exam  -     EKG 12 lead; Future  -     EKG 12 lead    Essential hypertension    Mixed hyperlipidemia    Class 3 severe obesity due to excess calories with serious comorbidity and body mass index (BMI) of 40.0 to 44.9 in adult [facility])        Comments: ekg no acute chg---heart cath done past    PATIENT IS CLEARED FOR ORTHOPEDIC SURGERY    Diet exer lsoe wt     Lab today  Hm isuses  A great deal of time spent reviewing medications, diet, exercise, social issues. Also reviewing the chart before entering the room with patient and finishing charting after leaving patient's room. More than half of that time was spent face to face with the patient in counseling and coordinating care. Ck bp daily    Follow Up: Return in about 6 months (around 7/26/2021) for Lab Before.      Seen by:  Andrew Ledbetter DO

## 2021-01-27 ENCOUNTER — TELEPHONE (OUTPATIENT)
Dept: PRIMARY CARE CLINIC | Age: 71
End: 2021-01-27

## 2021-02-05 ENCOUNTER — TELEPHONE (OUTPATIENT)
Dept: ORTHOPEDIC SURGERY | Age: 71
End: 2021-02-05

## 2021-02-05 NOTE — TELEPHONE ENCOUNTER
Patient has upcoming surgery on 2- with Dr. Gautam Gonzalez for her Left Knee and left a voicemail stating she has some questions and wants a return call. Can be reached #[phone]    I reviewed her chart and returned the call. Patient jaime she spoke with PAT department since her call to me and they answered most of her questions.

## 2021-02-12 ENCOUNTER — PREP FOR PROCEDURE (OUTPATIENT)
Dept: ORTHOPEDIC SURGERY | Age: 71
End: 2021-02-12

## 2021-02-12 DIAGNOSIS — T84.033A LOOSENING OF PROSTHESIS OF LEFT TOTAL KNEE REPLACEMENT, INITIAL ENCOUNTER (HCC): Primary | ICD-10-CM

## 2021-02-12 DIAGNOSIS — Z86.39 HISTORY OF BEING OBESE: ICD-10-CM

## 2021-02-12 RX ORDER — SODIUM CHLORIDE 0.9 % (FLUSH) 0.9 %
10 SYRINGE (ML) INJECTION PRN
Status: CANCELLED | OUTPATIENT
Start: 2021-02-12

## 2021-02-12 RX ORDER — SODIUM CHLORIDE 0.9 % (FLUSH) 0.9 %
10 SYRINGE (ML) INJECTION EVERY 12 HOURS SCHEDULED
Status: CANCELLED | OUTPATIENT
Start: 2021-02-12

## 2021-02-12 NOTE — H&P (VIEW-ONLY)
Chief Complaint   Patient presents with    Knee Pain       discuss L knee revision vs explant    Results       blood cultures, ESR, CRP         SUBJECTIVE: Patient is a very pleasant 66-year-old female came in to see me a while back for left knee pain. She had had a total knee done by another surgeon 2001. She had work-up at that point to loosening. She has had a tap and a dry tap in the past.  Neither which including her blood cultures have grown any bacteria. She appears to be aseptic loosening even clinically. She has had no fevers, chills, or feelings of illness. Our plan was to attempt another tap today and get her set up for revision versus antibiotic spacer placement. We talked in detail about the fact that she could have issues after this revision as well and she understands that is at the point that she is having difficulty with her ADLs and want something done. She had an ESR and CRP done which were 22 and 1.3 respectively. These have been stable over the years and are just mildly elevated. Otherwise she denies any further symptoms except for the pain.     Review of Systems   Constitutional: Negative for fever, chills, diaphoresis, appetite change and fatigue. HENT: Negative for dental issues, hearing loss and tinnitus. Negative for congestion, sinus pressure, sneezing, sore throat. Negative for headache. Eyes: Negative for visual disturbance, blurred and double vision. Negative for pain, discharge, redness and itching  Respiratory: Negative for cough, shortness of breath and wheezing. Cardiovascular: Negative for chest pain, palpitations and leg swelling. No dyspnea on exertion   Gastrointestinal:   Negative for nausea, vomiting, abdominal pain, diarrhea, constipation  or black or bloody. Hematologic\Lymphatic:  negative for bleeding, petechiae,   Genitourinary: Negative for hematuria and difficulty urinating. Musculoskeletal: Negative for neck pain and stiffness. Negative for back pain, see HPI  Skin: Negative for pallor, rash and wound. Neurological: Negative for dizziness, tremors, seizures, weakness, light-headedness, no TIA or stroke symptoms. No numbness and headaches. Psychiatric/Behavioral: Negative. Past Medical History   Past Medical History:   Diagnosis Date    Anxiety      Basal cell carcinoma (BCC) of right shoulder      Chronic back pain      Closed avulsion fracture of distal fibula with delayed healing, left      Degenerative joint disease involving multiple joints      Diverticulitis      Hyperlipidemia      Hypertension       Essential hypertension    Liver disease      LLL pneumonia      Obesity      Osteoarthritis      Palpitations 9/17/2012    Sleep apnea      Tricuspid regurgitation      Trochanteric bursitis of right hip      Varicose veins of both lower extremities              Past Surgical History         Past Surgical History:   Procedure Laterality Date    BREAST SURGERY   1990     cyst lt breast    CARDIAC CATHETERIZATION   06/19/2019     dr Nubia Rosas    CHOLECYSTECTOMY        COLONOSCOPY   5/2012    HYSTERECTOMY   1980    JOINT REPLACEMENT   2001     L knee    JOINT REPLACEMENT   2001     rt knee         Family History         Family History   Problem Relation Age of Onset    Lung Cancer Father      Thyroid Disease Sister           Social History            Tobacco Use    Smoking status: Never Smoker    Smokeless tobacco: Never Used   Substance Use Topics    Alcohol use: Yes       Alcohol/week: 1.0 standard drinks       Types: 1 Glasses of wine per week       Comment: socially    Drug use: No         OBJECTIVE:       Physical Examination:   General appearance: alert, well appearing, and in no distress, over weight.  No visible signs of trauma Mental status: alert, oriented to person, place, and time, normal mood, behavior, speech, dress, motor activity, and thought processes  Abdomen: soft, nondistended  Resp:   resp easy and unlabored, no audible wheezes note, normal symmetrical expansion of both hemithoraces  Cardiac: distal pulses palpable, skin and extremities well perfused  Neurological: alert, oriented X3, normal speech, no focal findings or movement disorder noted, motor and sensory grossly normal bilaterally, normal muscle tone, no tremors, strength 5/5, normal gait and station  HEENT: normochephalic atraumatic, external ears and eyes normal, sclera normal, neck supple, no nasal discharge. Extremities:   peripheral pulses normal, no edema, redness or tenderness in the calves   Skin: normal coloration, no rashes or open wounds, no suspicious skin lesions noted  Psych: Affect euthymic   Musculoskeletal:   Extremity:  Left knee  Skin intact. Her incision well-healed              Very mild warmth              No other signs of infection              No effusion noted. Global TTP. Stable to varus and valgus at 30 degrees of flexion. Negative Lachman's and posterior drawer. Compartments soft and compressible. NVID. 2/4 DP and PT pulses. Patella tracks normally              Calves soft and NT.                5/5 EHL, TA, GS. Range of motion is 0 to 95 degrees        Temp 98.5 °F (36.9 °C) (Oral)      XR: Devious x-rays show a lucency about the femoral and tibial components especially the medial side the tibial component behind the anterior flange of the femoral component        ASSESSMENT:       Diagnosis Orders   1.  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 An 18-gauge spinal needle was inserted at the superior lateral patella. One small drop of fluid was obtained which is not enough to culture. It appeared normal in nature with a good string sign. A Band-Aid was placed. Patient tolerated well.         Discussion: We talked in detail today about the fact that with her tap we got a small drop of fluid which is untestable, although this can be a good sign me that she does not have a large effusion. The fluid did have a string sign appeared normal.  Her blood cultures have been negative and her ESR and CRP is stayed stable but slightly elevated. We will go in for the surgery performed frozen section of that is normal we will do implantation of a revision knee and take cultures. If the cultures are positive what to treat her with antibiotics. If her frozen section is not normal implant articulating antibiotic spacer. She understands this. She understands the risk of surgery including death from anesthesia, further infection, need for another explantation, possible loss of limb, possible wound healing issues, possibly a failure of the new prosthesis, possibly of blood clotting issues, and any other unforeseeable complications.   She would like to go forward with the treatment plan.     PLAN:  Schedule for revision total knee due to loosening aseptic versus septic     Call with any questions or concerns     Follow-up for surgery

## 2021-02-12 NOTE — H&P
Chief Complaint   Patient presents with    Knee Pain       discuss L knee revision vs explant    Results       blood cultures, ESR, CRP         SUBJECTIVE: Patient is a very pleasant 68-year-old female came in to see me a while back for left knee pain. She had had a total knee done by another surgeon 2001. She had work-up at that point to loosening. She has had a tap and a dry tap in the past.  Neither which including her blood cultures have grown any bacteria. She appears to be aseptic loosening even clinically. She has had no fevers, chills, or feelings of illness. Our plan was to attempt another tap today and get her set up for revision versus antibiotic spacer placement. We talked in detail about the fact that she could have issues after this revision as well and she understands that is at the point that she is having difficulty with her ADLs and want something done. She had an ESR and CRP done which were 22 and 1.3 respectively. These have been stable over the years and are just mildly elevated. Otherwise she denies any further symptoms except for the pain.     Review of Systems   Constitutional: Negative for fever, chills, diaphoresis, appetite change and fatigue. HENT: Negative for dental issues, hearing loss and tinnitus. Negative for congestion, sinus pressure, sneezing, sore throat. Negative for headache. Eyes: Negative for visual disturbance, blurred and double vision. Negative for pain, discharge, redness and itching  Respiratory: Negative for cough, shortness of breath and wheezing. Cardiovascular: Negative for chest pain, palpitations and leg swelling. No dyspnea on exertion   Gastrointestinal:   Negative for nausea, vomiting, abdominal pain, diarrhea, constipation  or black or bloody. Hematologic\Lymphatic:  negative for bleeding, petechiae,   Genitourinary: Negative for hematuria and difficulty urinating. Musculoskeletal: Negative for neck pain and stiffness.  Negative for back pain, see HPI  Skin: Negative for pallor, rash and wound. Neurological: Negative for dizziness, tremors, seizures, weakness, light-headedness, no TIA or stroke symptoms. No numbness and headaches. Psychiatric/Behavioral: Negative. Past Medical History   Past Medical History:   Diagnosis Date    Anxiety      Basal cell carcinoma (BCC) of right shoulder      Chronic back pain      Closed avulsion fracture of distal fibula with delayed healing, left      Degenerative joint disease involving multiple joints      Diverticulitis      Hyperlipidemia      Hypertension       Essential hypertension    Liver disease      LLL pneumonia      Obesity      Osteoarthritis      Palpitations 9/17/2012    Sleep apnea      Tricuspid regurgitation      Trochanteric bursitis of right hip      Varicose veins of both lower extremities              Past Surgical History         Past Surgical History:   Procedure Laterality Date    BREAST SURGERY   1990     cyst lt breast    CARDIAC CATHETERIZATION   06/19/2019     dr Tiffanie Collazo    CHOLECYSTECTOMY        COLONOSCOPY   5/2012    HYSTERECTOMY   1980    JOINT REPLACEMENT   2001     L knee    JOINT REPLACEMENT   2001     rt knee         Family History         Family History   Problem Relation Age of Onset    Lung Cancer Father      Thyroid Disease Sister           Social History            Tobacco Use    Smoking status: Never Smoker    Smokeless tobacco: Never Used   Substance Use Topics    Alcohol use: Yes       Alcohol/week: 1.0 standard drinks       Types: 1 Glasses of wine per week       Comment: socially    Drug use: No         OBJECTIVE:       Physical Examination:   General appearance: alert, well appearing, and in no distress, over weight.  No visible signs of trauma   Mental status: alert, oriented to person, place, and time, normal mood, behavior, speech, dress, motor activity, and thought processes  Abdomen: soft, nondistended  Resp:   resp easy and unlabored, no audible wheezes note, normal symmetrical expansion of both hemithoraces  Cardiac: distal pulses palpable, skin and extremities well perfused  Neurological: alert, oriented X3, normal speech, no focal findings or movement disorder noted, motor and sensory grossly normal bilaterally, normal muscle tone, no tremors, strength 5/5, normal gait and station  HEENT: normochephalic atraumatic, external ears and eyes normal, sclera normal, neck supple, no nasal discharge. Extremities:   peripheral pulses normal, no edema, redness or tenderness in the calves   Skin: normal coloration, no rashes or open wounds, no suspicious skin lesions noted  Psych: Affect euthymic   Musculoskeletal:   Extremity:  Left knee  Skin intact. Her incision well-healed              Very mild warmth              No other signs of infection              No effusion noted. Global TTP. Stable to varus and valgus at 30 degrees of flexion. Negative Lachman's and posterior drawer. Compartments soft and compressible. NVID. 2/4 DP and PT pulses. Patella tracks normally              Calves soft and NT.                5/5 EHL, TA, GS. Range of motion is 0 to 95 degrees        Temp 98.5 °F (36.9 °C) (Oral)      XR: Devious x-rays show a lucency about the femoral and tibial components especially the medial side the tibial component behind the anterior flange of the femoral component        ASSESSMENT:       Diagnosis Orders   1. Loosening of prosthesis of left total knee replacement, initial encounter ([facility])  UT ARTHROCENTESIS ASPIR&/INJ MAJOR JT/BURSA W/O US     Gram Stain     Body Fluid Cell Count with Differential     Culture, Body Fluid     AFB Fluid Culture (ISO)     Glucose, Body Fluid     Gram Stain     Body Fluid Cell Count with Differential     Glucose, Body Fluid      Procedure note: The left knee was sterilely prepped.   An 18-gauge spinal needle was inserted at the superior lateral patella. One small drop of fluid was obtained which is not enough to culture. It appeared normal in nature with a good string sign. A Band-Aid was placed. Patient tolerated well.         Discussion: We talked in detail today about the fact that with her tap we got a small drop of fluid which is untestable, although this can be a good sign me that she does not have a large effusion. The fluid did have a string sign appeared normal.  Her blood cultures have been negative and her ESR and CRP is stayed stable but slightly elevated. We will go in for the surgery performed frozen section of that is normal we will do implantation of a revision knee and take cultures. If the cultures are positive what to treat her with antibiotics. If her frozen section is not normal implant articulating antibiotic spacer. She understands this. She understands the risk of surgery including death from anesthesia, further infection, need for another explantation, possible loss of limb, possible wound healing issues, possibly a failure of the new prosthesis, possibly of blood clotting issues, and any other unforeseeable complications.   She would like to go forward with the treatment plan.     PLAN:  Schedule for revision total knee due to loosening aseptic versus septic     Call with any questions or concerns     Follow-up for surgery

## 2021-02-15 NOTE — PROGRESS NOTES
[facility] 34 PRE-ADMISSION TESTING GENERAL INSTRUCTIONS- [facility]-phone number:[phone]    GENERAL INSTRUCTIONS  [x] Antibacterial Soap shower Night before surgery  [x] Ready Prep CHG wipe instruction sheet and wipes given. [x] Nothing by mouth after midnight, including gum, candy, mints, or water. [x] You may brush your teeth, gargle, but do NOT swallow water. [x]No smoking, chewing tobacco, illegal drugs, or alcohol within 24 hours of your surgery. [x] Jewelry, valuables or body piercing's should not be brought to the hospital. All body and/or tongue piercing's must be removed prior to arriving to hospital.  ALL hair pins must be removed. [x] Do not wear makeup, lotions, powders, deodorant. Nail polish as directed by the nurse. [x] Arrange transportation with a responsible adult  to and from the hospital. If you do not have a responsible adult  to transport you, you will need to make arrangements with a medical transportation company (i.e. [employer]. A Uber/taxi/bus is not appropriate unless you are accompanied by a responsible adult ). Arrange for someone to be with you for the remainder of the day and for 24 hours after your procedure due to having had anesthesia. Who will be your  for transportation? Shelbi Vasquez, daughter    [x] Bring insurance card and photo ID. [x] Transfusion Bracelet: Please bring with you to hospital, day of surgery           PARKING INSTRUCTIONS:   [x] Arrival Time: 7 am Park on [facility], enter the main entrance. One person may accompany you. Wear a mask. [x] To reach the [facility] from [facility], upon entering the hospital, take elevator B to the 3rd floor. Check in with the . A parking token will be provided if needed. EDUCATION INSTRUCTIONS:      [] Knee or hip replacement booklet & exercise pamphlets given.        [x] Pre-admission Testing educational folder given  [x] Incentive Spirometry,coughing & deep breathing exercises reviewed. [x]Medication information sheet(s)   [x]Fluoroscopy-Xray used in surgery reviewed with patient. Educational pamphlet placed in chart. [x]Pain: Post-op pain is normal and to be expected. You will be asked to rate your pain from 0-10 (a zero is not acceptable-education is needed). Your post-op pain goal.   [x] Ask your nurse for your pain medication. [] Joint camp offered. N/A per [place]. [employer] states calling patient to review information. [] Joint replacement booklets given. [x] Other:  Wear loose comfortable clothing day of surgery. MEDICATION INSTRUCTIONS:  [x]Bring a complete list of your medications, please write the last time you took the medicine, give this list to the nurse. [x] Take the following medications the morning of surgery with 1-2 ounces of water: bupropion  [x] Stop herbal supplements and vitamins 5 days before your surgery. [] Follow physician instructions regarding any blood thinners you may be taking. WHAT TO EXPECT:  [x] The day of surgery you will be greeted and checked in by the [employer]. Please bring your photo ID and insurance card. A nurse will greet you in accordance to the time you are needed in the pre-op area to prepare you for surgery. Please do not be discouraged if you are not greeted in the order you arrive as there are many variables that are involved in patient preparation. Your patience is greatly appreciated as you wait for your nurse. Please bring in items such as: books, magazines, newspapers, electronics, or any other items  to occupy your time in the waiting area. [x]  Delays may occur with surgery and staff will make a sincere effort to keep you informed of delays. If any delays occur with your procedure, we apologize ahead of time for your inconvenience as we recognize the value of your time.

## 2021-02-16 ENCOUNTER — HOSPITAL ENCOUNTER (OUTPATIENT)
Age: 71
Discharge: HOME | End: 2021-02-18
Payer: MEDICARE

## 2021-02-16 ENCOUNTER — HOSPITAL ENCOUNTER (OUTPATIENT)
Dept: PREADMISSION TESTING | Age: 71
Discharge: HOME | End: 2021-02-16
Payer: MEDICARE

## 2021-02-16 VITALS
HEART RATE: 86 BPM | OXYGEN SATURATION: 99 % | WEIGHT: 260 LBS | RESPIRATION RATE: 16 BRPM | DIASTOLIC BLOOD PRESSURE: 82 MMHG | BODY MASS INDEX: 43.32 KG/M2 | HEIGHT: 65 IN | SYSTOLIC BLOOD PRESSURE: 175 MMHG | TEMPERATURE: 97.8 F

## 2021-02-16 DIAGNOSIS — T84.033A LOOSENING OF PROSTHESIS OF LEFT TOTAL KNEE REPLACEMENT, INITIAL ENCOUNTER (HCC): ICD-10-CM

## 2021-02-16 DIAGNOSIS — U07.1 COVID-19: ICD-10-CM

## 2021-02-16 DIAGNOSIS — Z86.39 HISTORY OF BEING OBESE: ICD-10-CM

## 2021-02-16 LAB
ABO/RH: NORMAL
ALBUMIN SERPL-MCNC: 4.4 G/DL (ref 3.5–5.2)
ALP BLD-CCNC: 130 U/L (ref 35–104)
ALT SERPL-CCNC: 20 U/L (ref 0–32)
ANION GAP SERPL CALCULATED.3IONS-SCNC: 8 MMOL/L (ref 7–16)
ANTIBODY SCREEN: NORMAL
AST SERPL-CCNC: 23 U/L (ref 0–31)
BASOPHILS ABSOLUTE: 0.03 E9/L (ref 0–0.2)
BASOPHILS RELATIVE PERCENT: 0.5 % (ref 0–2)
BILIRUB SERPL-MCNC: 1 MG/DL (ref 0–1.2)
BUN BLDV-MCNC: 11 MG/DL (ref 8–23)
CALCIUM SERPL-MCNC: 9.7 MG/DL (ref 8.6–10.2)
CHLORIDE BLD-SCNC: 103 MMOL/L (ref 98–107)
CO2: 27 MMOL/L (ref 22–29)
CREAT SERPL-MCNC: 0.8 MG/DL (ref 0.5–1)
EOSINOPHILS ABSOLUTE: 0.15 E9/L (ref 0.05–0.5)
EOSINOPHILS RELATIVE PERCENT: 2.4 % (ref 0–6)
GFR AFRICAN AMERICAN: >60
GFR NON-AFRICAN AMERICAN: >60 ML/MIN/1.73
GLUCOSE BLD-MCNC: 105 MG/DL (ref 74–99)
HCT VFR BLD CALC: 42 % (ref 34–48)
HEMOGLOBIN: 14.1 G/DL (ref 11.5–15.5)
IMMATURE GRANULOCYTES #: 0.03 E9/L
IMMATURE GRANULOCYTES %: 0.5 % (ref 0–5)
INR BLD: 1.1
LYMPHOCYTES ABSOLUTE: 1.56 E9/L (ref 1.5–4)
LYMPHOCYTES RELATIVE PERCENT: 24.6 % (ref 20–42)
MCH RBC QN AUTO: 29.7 PG (ref 26–35)
MCHC RBC AUTO-ENTMCNC: 33.6 % (ref 32–34.5)
MCV RBC AUTO: 88.6 FL (ref 80–99.9)
MONOCYTES ABSOLUTE: 0.39 E9/L (ref 0.1–0.95)
MONOCYTES RELATIVE PERCENT: 6.1 % (ref 2–12)
NEUTROPHILS ABSOLUTE: 4.19 E9/L (ref 1.8–7.3)
NEUTROPHILS RELATIVE PERCENT: 65.9 % (ref 43–80)
PDW BLD-RTO: 12.7 FL (ref 11.5–15)
PLATELET # BLD: 250 E9/L (ref 130–450)
PMV BLD AUTO: 11.1 FL (ref 7–12)
POTASSIUM SERPL-SCNC: 3.9 MMOL/L (ref 3.5–5)
PROTHROMBIN TIME: 12.5 SEC (ref 9.3–12.4)
RBC # BLD: 4.74 E12/L (ref 3.5–5.5)
SODIUM BLD-SCNC: 138 MMOL/L (ref 132–146)
TOTAL PROTEIN: 7.3 G/DL (ref 6.4–8.3)
WBC # BLD: 6.4 E9/L (ref 4.5–11.5)

## 2021-02-16 PROCEDURE — U0003 INFECTIOUS AGENT DETECTION BY NUCLEIC ACID (DNA OR RNA); SEVERE ACUTE RESPIRATORY SYNDROME CORONAVIRUS 2 (SARS-COV-2) (CORONAVIRUS DISEASE [COVID-19]), AMPLIFIED PROBE TECHNIQUE, MAKING USE OF HIGH THROUGHPUT TECHNOLOGIES AS DESCRIBED BY CMS-2020-01-R: HCPCS

## 2021-02-16 PROCEDURE — 36415 COLL VENOUS BLD VENIPUNCTURE: CPT

## 2021-02-16 PROCEDURE — 86900 BLOOD TYPING SEROLOGIC ABO: CPT

## 2021-02-16 PROCEDURE — 86850 RBC ANTIBODY SCREEN: CPT

## 2021-02-16 PROCEDURE — 87081 CULTURE SCREEN ONLY: CPT

## 2021-02-16 PROCEDURE — 80053 COMPREHEN METABOLIC PANEL: CPT

## 2021-02-16 PROCEDURE — 85025 COMPLETE CBC W/AUTO DIFF WBC: CPT

## 2021-02-16 PROCEDURE — 86901 BLOOD TYPING SEROLOGIC RH(D): CPT

## 2021-02-16 PROCEDURE — 85610 PROTHROMBIN TIME: CPT

## 2021-02-17 LAB
MRSA CULTURE ONLY: NORMAL
SARS-COV-2: NOT DETECTED
SOURCE: NORMAL

## 2021-02-20 ENCOUNTER — ANESTHESIA EVENT (OUTPATIENT)
Dept: OPERATING ROOM | Age: 71
DRG: 467 | End: 2021-02-20
Payer: MEDICARE

## 2021-02-22 ENCOUNTER — APPOINTMENT (OUTPATIENT)
Dept: GENERAL RADIOLOGY | Age: 71
DRG: 467 | End: 2021-02-22
Attending: ORTHOPAEDIC SURGERY
Payer: MEDICARE

## 2021-02-22 ENCOUNTER — ANESTHESIA (OUTPATIENT)
Dept: OPERATING ROOM | Age: 71
DRG: 467 | End: 2021-02-22
Payer: MEDICARE

## 2021-02-22 ENCOUNTER — HOSPITAL ENCOUNTER (INPATIENT)
Age: 71
LOS: 2 days | Discharge: NURSING, REHAB OR LONG-TERM CARE | DRG: 467 | End: 2021-02-24
Attending: ORTHOPAEDIC SURGERY | Admitting: ORTHOPAEDIC SURGERY
Payer: MEDICARE

## 2021-02-22 VITALS
TEMPERATURE: 95.2 F | RESPIRATION RATE: 16 BRPM | SYSTOLIC BLOOD PRESSURE: 130 MMHG | DIASTOLIC BLOOD PRESSURE: 62 MMHG | OXYGEN SATURATION: 100 %

## 2021-02-22 DIAGNOSIS — U07.1 COVID-19: Primary | ICD-10-CM

## 2021-02-22 DIAGNOSIS — Z96.659 HISTORY OF REVISION OF TOTAL KNEE ARTHROPLASTY: ICD-10-CM

## 2021-02-22 PROBLEM — E78.5 HYPERLIPIDEMIA: Chronic | Status: ACTIVE | Noted: 2019-06-04

## 2021-02-22 PROBLEM — E78.5 HYPERLIPIDEMIA: Status: ACTIVE | Noted: 2019-06-04

## 2021-02-22 LAB
C-REACTIVE PROTEIN: 1 MG/DL (ref 0–0.4)
SEDIMENTATION RATE, ERYTHROCYTE: 19 MM/HR (ref 0–20)

## 2021-02-22 PROCEDURE — 87102 FUNGUS ISOLATION CULTURE: CPT

## 2021-02-22 PROCEDURE — 87205 SMEAR GRAM STAIN: CPT

## 2021-02-22 PROCEDURE — 87075 CULTR BACTERIA EXCEPT BLOOD: CPT

## 2021-02-22 PROCEDURE — 85651 RBC SED RATE NONAUTOMATED: CPT

## 2021-02-22 PROCEDURE — 2500000003 HC RX 250 WO HCPCS: Performed by: PHYSICIAN ASSISTANT

## 2021-02-22 PROCEDURE — 6360000002 HC RX W HCPCS: Performed by: ORTHOPAEDIC SURGERY

## 2021-02-22 PROCEDURE — 87116 MYCOBACTERIA CULTURE: CPT

## 2021-02-22 PROCEDURE — 88331 PATH CONSLTJ SURG 1 BLK 1SPC: CPT

## 2021-02-22 PROCEDURE — 2500000003 HC RX 250 WO HCPCS: Performed by: ORTHOPAEDIC SURGERY

## 2021-02-22 PROCEDURE — C1713 ANCHOR/SCREW BN/BN,TIS/BN: HCPCS | Performed by: ORTHOPAEDIC SURGERY

## 2021-02-22 PROCEDURE — 3700000001 HC ADD 15 MINUTES (ANESTHESIA): Performed by: ORTHOPAEDIC SURGERY

## 2021-02-22 PROCEDURE — 6360000002 HC RX W HCPCS: Performed by: PHYSICIAN ASSISTANT

## 2021-02-22 PROCEDURE — 2709999900 HC NON-CHARGEABLE SUPPLY: Performed by: ORTHOPAEDIC SURGERY

## 2021-02-22 PROCEDURE — 0SPD0JZ REMOVAL OF SYNTHETIC SUBSTITUTE FROM LEFT KNEE JOINT, OPEN APPROACH: ICD-10-PCS | Performed by: ORTHOPAEDIC SURGERY

## 2021-02-22 PROCEDURE — 7100000001 HC PACU RECOVERY - ADDTL 15 MIN: Performed by: ORTHOPAEDIC SURGERY

## 2021-02-22 PROCEDURE — 6370000000 HC RX 637 (ALT 250 FOR IP): Performed by: STUDENT IN AN ORGANIZED HEALTH CARE EDUCATION/TRAINING PROGRAM

## 2021-02-22 PROCEDURE — 2580000003 HC RX 258: Performed by: STUDENT IN AN ORGANIZED HEALTH CARE EDUCATION/TRAINING PROGRAM

## 2021-02-22 PROCEDURE — 6360000002 HC RX W HCPCS

## 2021-02-22 PROCEDURE — 2720000010 HC SURG SUPPLY STERILE: Performed by: ORTHOPAEDIC SURGERY

## 2021-02-22 PROCEDURE — L3650 SO 8 ABD RESTRAINT PRE OTS: HCPCS | Performed by: ORTHOPAEDIC SURGERY

## 2021-02-22 PROCEDURE — 2580000003 HC RX 258: Performed by: ORTHOPAEDIC SURGERY

## 2021-02-22 PROCEDURE — 88300 SURGICAL PATH GROSS: CPT

## 2021-02-22 PROCEDURE — 0SRD0J9 REPLACEMENT OF LEFT KNEE JOINT WITH SYNTHETIC SUBSTITUTE, CEMENTED, OPEN APPROACH: ICD-10-PCS | Performed by: ORTHOPAEDIC SURGERY

## 2021-02-22 PROCEDURE — 2580000003 HC RX 258: Performed by: PHYSICIAN ASSISTANT

## 2021-02-22 PROCEDURE — 6360000002 HC RX W HCPCS: Performed by: ANESTHESIOLOGY

## 2021-02-22 PROCEDURE — 3700000000 HC ANESTHESIA ATTENDED CARE: Performed by: ORTHOPAEDIC SURGERY

## 2021-02-22 PROCEDURE — 87070 CULTURE OTHR SPECIMN AEROBIC: CPT

## 2021-02-22 PROCEDURE — 2580000003 HC RX 258: Performed by: ANESTHESIOLOGY

## 2021-02-22 PROCEDURE — 1200000000 HC SEMI PRIVATE

## 2021-02-22 PROCEDURE — 2700000000 HC OXYGEN THERAPY PER DAY

## 2021-02-22 PROCEDURE — 86140 C-REACTIVE PROTEIN: CPT

## 2021-02-22 PROCEDURE — 2500000003 HC RX 250 WO HCPCS: Performed by: ANESTHESIOLOGY

## 2021-02-22 PROCEDURE — 64447 NJX AA&/STRD FEMORAL NRV IMG: CPT | Performed by: ANESTHESIOLOGY

## 2021-02-22 PROCEDURE — 88305 TISSUE EXAM BY PATHOLOGIST: CPT

## 2021-02-22 PROCEDURE — 6360000002 HC RX W HCPCS: Performed by: STUDENT IN AN ORGANIZED HEALTH CARE EDUCATION/TRAINING PROGRAM

## 2021-02-22 PROCEDURE — 3600000005 HC SURGERY LEVEL 5 BASE: Performed by: ORTHOPAEDIC SURGERY

## 2021-02-22 PROCEDURE — 2500000003 HC RX 250 WO HCPCS: Performed by: STUDENT IN AN ORGANIZED HEALTH CARE EDUCATION/TRAINING PROGRAM

## 2021-02-22 PROCEDURE — 2500000003 HC RX 250 WO HCPCS

## 2021-02-22 PROCEDURE — 36415 COLL VENOUS BLD VENIPUNCTURE: CPT

## 2021-02-22 PROCEDURE — 3600000015 HC SURGERY LEVEL 5 ADDTL 15MIN: Performed by: ORTHOPAEDIC SURGERY

## 2021-02-22 PROCEDURE — 73560 X-RAY EXAM OF KNEE 1 OR 2: CPT

## 2021-02-22 PROCEDURE — 87206 SMEAR FLUORESCENT/ACID STAI: CPT

## 2021-02-22 PROCEDURE — 87015 SPECIMEN INFECT AGNT CONCNTJ: CPT

## 2021-02-22 PROCEDURE — C1776 JOINT DEVICE (IMPLANTABLE): HCPCS | Performed by: ORTHOPAEDIC SURGERY

## 2021-02-22 PROCEDURE — 27487 REVISE/REPLACE KNEE JOINT: CPT | Performed by: ORTHOPAEDIC SURGERY

## 2021-02-22 PROCEDURE — 7100000000 HC PACU RECOVERY - FIRST 15 MIN: Performed by: ORTHOPAEDIC SURGERY

## 2021-02-22 PROCEDURE — 3E0T3BZ INTRODUCTION OF ANESTHETIC AGENT INTO PERIPHERAL NERVES AND PLEXI, PERCUTANEOUS APPROACH: ICD-10-PCS | Performed by: ORTHOPAEDIC SURGERY

## 2021-02-22 DEVICE — AUGMENT TIB SZ A KNEE TRITANIUM SYMMETRICAL CONE REV: Type: IMPLANTABLE DEVICE | Site: KNEE | Status: FUNCTIONAL

## 2021-02-22 DEVICE — CEMENT BNE 20ML 41GM FULL DOSE PMMA W/ TOBRA M VISC RADPQ: Type: IMPLANTABLE DEVICE | Site: TIBIA | Status: FUNCTIONAL

## 2021-02-22 DEVICE — INSERT TIB SZ 4 THK9MM KNEE X3 TOT STBL + TRIATHLON: Type: IMPLANTABLE DEVICE | Site: KNEE | Status: FUNCTIONAL

## 2021-02-22 DEVICE — STEM FEM L100MM DIA12MM TI TOT STBL CEMENTLESS FLUT: Type: IMPLANTABLE DEVICE | Site: KNEE | Status: FUNCTIONAL

## 2021-02-22 DEVICE — STEM FEM L100MM DIA16MM TI TOT STBL CEMENTLESS FLUT: Type: IMPLANTABLE DEVICE | Site: KNEE | Status: FUNCTIONAL

## 2021-02-22 DEVICE — BASEPLATE TIB SZ 4 UNIV KNEE TRITANIUM TOT STBL CEM: Type: IMPLANTABLE DEVICE | Site: KNEE | Status: FUNCTIONAL

## 2021-02-22 DEVICE — AUGMENT FEM SZ 4 THK5MM POST KNEE CO CHROM TOT STBL FULL: Type: IMPLANTABLE DEVICE | Site: KNEE | Status: FUNCTIONAL

## 2021-02-22 DEVICE — COMPONENT FEM SZ 4 L KNEE TOT STBL TRIATHLON: Type: IMPLANTABLE DEVICE | Site: KNEE | Status: FUNCTIONAL

## 2021-02-22 RX ORDER — LIDOCAINE HYDROCHLORIDE 20 MG/ML
INJECTION, SOLUTION INTRAVENOUS PRN
Status: DISCONTINUED | OUTPATIENT
Start: 2021-02-22 | End: 2021-02-22 | Stop reason: SDUPTHER

## 2021-02-22 RX ORDER — SODIUM CHLORIDE 0.9 % (FLUSH) 0.9 %
10 SYRINGE (ML) INJECTION PRN
Status: DISCONTINUED | OUTPATIENT
Start: 2021-02-22 | End: 2021-02-24 | Stop reason: HOSPADM

## 2021-02-22 RX ORDER — OXYCODONE HYDROCHLORIDE 5 MG/1
5 TABLET ORAL EVERY 6 HOURS PRN
Qty: 28 TABLET | Refills: 0 | Status: ON HOLD | OUTPATIENT
Start: 2021-02-22 | End: 2021-03-09 | Stop reason: HOSPADM

## 2021-02-22 RX ORDER — SODIUM CHLORIDE 0.9 % (FLUSH) 0.9 %
10 SYRINGE (ML) INJECTION EVERY 12 HOURS SCHEDULED
Status: DISCONTINUED | OUTPATIENT
Start: 2021-02-22 | End: 2021-02-24 | Stop reason: HOSPADM

## 2021-02-22 RX ORDER — PROPOFOL 10 MG/ML
INJECTION, EMULSION INTRAVENOUS PRN
Status: DISCONTINUED | OUTPATIENT
Start: 2021-02-22 | End: 2021-02-22 | Stop reason: SDUPTHER

## 2021-02-22 RX ORDER — FENTANYL CITRATE 50 UG/ML
INJECTION, SOLUTION INTRAMUSCULAR; INTRAVENOUS PRN
Status: DISCONTINUED | OUTPATIENT
Start: 2021-02-22 | End: 2021-02-22 | Stop reason: SDUPTHER

## 2021-02-22 RX ORDER — ASPIRIN 325 MG
325 TABLET, DELAYED RELEASE (ENTERIC COATED) ORAL 2 TIMES DAILY WITH MEALS
Qty: 60 TABLET | Refills: 0 | Status: SHIPPED | OUTPATIENT
Start: 2021-02-22 | End: 2022-01-25

## 2021-02-22 RX ORDER — VANCOMYCIN HYDROCHLORIDE 1 G/20ML
INJECTION, POWDER, LYOPHILIZED, FOR SOLUTION INTRAVENOUS PRN
Status: DISCONTINUED | OUTPATIENT
Start: 2021-02-22 | End: 2021-02-22 | Stop reason: HOSPADM

## 2021-02-22 RX ORDER — SODIUM CHLORIDE 0.9 % (FLUSH) 0.9 %
10 SYRINGE (ML) INJECTION PRN
Status: DISCONTINUED | OUTPATIENT
Start: 2021-02-22 | End: 2021-02-22 | Stop reason: HOSPADM

## 2021-02-22 RX ORDER — ROPIVACAINE HYDROCHLORIDE 5 MG/ML
30 INJECTION, SOLUTION EPIDURAL; INFILTRATION; PERINEURAL
Status: COMPLETED | OUTPATIENT
Start: 2021-02-22 | End: 2021-02-22

## 2021-02-22 RX ORDER — SODIUM CHLORIDE 9 MG/ML
INJECTION, SOLUTION INTRAVENOUS CONTINUOUS
Status: ACTIVE | OUTPATIENT
Start: 2021-02-22 | End: 2021-02-23

## 2021-02-22 RX ORDER — METHOCARBAMOL 750 MG/1
750 TABLET, FILM COATED ORAL 4 TIMES DAILY
Qty: 40 TABLET | Refills: 0 | Status: ON HOLD | OUTPATIENT
Start: 2021-02-22 | End: 2021-03-09 | Stop reason: HOSPADM

## 2021-02-22 RX ORDER — NALOXONE HYDROCHLORIDE 0.4 MG/ML
INJECTION, SOLUTION INTRAMUSCULAR; INTRAVENOUS; SUBCUTANEOUS PRN
Status: DISCONTINUED | OUTPATIENT
Start: 2021-02-22 | End: 2021-02-22 | Stop reason: SDUPTHER

## 2021-02-22 RX ORDER — MIDAZOLAM HYDROCHLORIDE 2 MG/2ML
1 INJECTION, SOLUTION INTRAMUSCULAR; INTRAVENOUS EVERY 5 MIN PRN
Status: DISCONTINUED | OUTPATIENT
Start: 2021-02-22 | End: 2021-02-22 | Stop reason: HOSPADM

## 2021-02-22 RX ORDER — ROCURONIUM BROMIDE 10 MG/ML
INJECTION, SOLUTION INTRAVENOUS PRN
Status: DISCONTINUED | OUTPATIENT
Start: 2021-02-22 | End: 2021-02-22 | Stop reason: SDUPTHER

## 2021-02-22 RX ORDER — GLYCOPYRROLATE 1 MG/5 ML
SYRINGE (ML) INTRAVENOUS PRN
Status: DISCONTINUED | OUTPATIENT
Start: 2021-02-22 | End: 2021-02-22 | Stop reason: SDUPTHER

## 2021-02-22 RX ORDER — OXYCODONE HYDROCHLORIDE 10 MG/1
10 TABLET ORAL EVERY 4 HOURS PRN
Status: DISCONTINUED | OUTPATIENT
Start: 2021-02-22 | End: 2021-02-24 | Stop reason: HOSPADM

## 2021-02-22 RX ORDER — BUPIVACAINE HYDROCHLORIDE 5 MG/ML
INJECTION, SOLUTION EPIDURAL; INTRACAUDAL
Status: COMPLETED | OUTPATIENT
Start: 2021-02-22 | End: 2021-02-22

## 2021-02-22 RX ORDER — MIDAZOLAM HYDROCHLORIDE 1 MG/ML
INJECTION INTRAMUSCULAR; INTRAVENOUS PRN
Status: DISCONTINUED | OUTPATIENT
Start: 2021-02-22 | End: 2021-02-22 | Stop reason: SDUPTHER

## 2021-02-22 RX ORDER — SODIUM CHLORIDE 9 MG/ML
INJECTION, SOLUTION INTRAVENOUS CONTINUOUS
Status: DISCONTINUED | OUTPATIENT
Start: 2021-02-22 | End: 2021-02-24 | Stop reason: HOSPADM

## 2021-02-22 RX ORDER — SIMVASTATIN 10 MG
40 TABLET ORAL NIGHTLY
Status: DISCONTINUED | OUTPATIENT
Start: 2021-02-22 | End: 2021-02-22 | Stop reason: CLARIF

## 2021-02-22 RX ORDER — ACETAMINOPHEN 325 MG/1
650 TABLET ORAL EVERY 6 HOURS
Status: DISCONTINUED | OUTPATIENT
Start: 2021-02-22 | End: 2021-02-24 | Stop reason: HOSPADM

## 2021-02-22 RX ORDER — BUPROPION HYDROCHLORIDE 300 MG/1
300 TABLET ORAL EVERY MORNING
Status: DISCONTINUED | OUTPATIENT
Start: 2021-02-23 | End: 2021-02-24 | Stop reason: HOSPADM

## 2021-02-22 RX ORDER — SIMVASTATIN 40 MG
40 TABLET ORAL NIGHTLY
Status: DISCONTINUED | OUTPATIENT
Start: 2021-02-22 | End: 2021-02-24 | Stop reason: HOSPADM

## 2021-02-22 RX ORDER — DEXAMETHASONE SODIUM PHOSPHATE 10 MG/ML
4 INJECTION, SOLUTION INTRAMUSCULAR; INTRAVENOUS ONCE
Status: COMPLETED | OUTPATIENT
Start: 2021-02-22 | End: 2021-02-22

## 2021-02-22 RX ORDER — DIPHENHYDRAMINE HYDROCHLORIDE 50 MG/ML
12.5 INJECTION INTRAMUSCULAR; INTRAVENOUS
Status: DISCONTINUED | OUTPATIENT
Start: 2021-02-22 | End: 2021-02-22 | Stop reason: HOSPADM

## 2021-02-22 RX ORDER — LIDOCAINE HYDROCHLORIDE 10 MG/ML
10 INJECTION, SOLUTION INFILTRATION; PERINEURAL
Status: COMPLETED | OUTPATIENT
Start: 2021-02-22 | End: 2021-02-22

## 2021-02-22 RX ORDER — ONDANSETRON 4 MG/1
4 TABLET, ORALLY DISINTEGRATING ORAL EVERY 8 HOURS PRN
Status: DISCONTINUED | OUTPATIENT
Start: 2021-02-22 | End: 2021-02-24 | Stop reason: HOSPADM

## 2021-02-22 RX ORDER — SODIUM CHLORIDE 0.9 % (FLUSH) 0.9 %
10 SYRINGE (ML) INJECTION EVERY 12 HOURS SCHEDULED
Status: DISCONTINUED | OUTPATIENT
Start: 2021-02-22 | End: 2021-02-22 | Stop reason: HOSPADM

## 2021-02-22 RX ORDER — ONDANSETRON 2 MG/ML
INJECTION INTRAMUSCULAR; INTRAVENOUS PRN
Status: DISCONTINUED | OUTPATIENT
Start: 2021-02-22 | End: 2021-02-22 | Stop reason: SDUPTHER

## 2021-02-22 RX ORDER — ONDANSETRON 2 MG/ML
4 INJECTION INTRAMUSCULAR; INTRAVENOUS EVERY 6 HOURS PRN
Status: DISCONTINUED | OUTPATIENT
Start: 2021-02-22 | End: 2021-02-24 | Stop reason: HOSPADM

## 2021-02-22 RX ORDER — MEPERIDINE HYDROCHLORIDE 25 MG/ML
12.5 INJECTION INTRAMUSCULAR; INTRAVENOUS; SUBCUTANEOUS EVERY 5 MIN PRN
Status: DISCONTINUED | OUTPATIENT
Start: 2021-02-22 | End: 2021-02-22 | Stop reason: HOSPADM

## 2021-02-22 RX ORDER — METOPROLOL TARTRATE 5 MG/5ML
INJECTION INTRAVENOUS PRN
Status: DISCONTINUED | OUTPATIENT
Start: 2021-02-22 | End: 2021-02-22 | Stop reason: SDUPTHER

## 2021-02-22 RX ORDER — OXYCODONE HYDROCHLORIDE 5 MG/1
5 TABLET ORAL EVERY 4 HOURS PRN
Status: DISCONTINUED | OUTPATIENT
Start: 2021-02-22 | End: 2021-02-24 | Stop reason: HOSPADM

## 2021-02-22 RX ORDER — SENNA AND DOCUSATE SODIUM 50; 8.6 MG/1; MG/1
1 TABLET, FILM COATED ORAL 2 TIMES DAILY
Status: DISCONTINUED | OUTPATIENT
Start: 2021-02-22 | End: 2021-02-24 | Stop reason: HOSPADM

## 2021-02-22 RX ORDER — NEOSTIGMINE METHYLSULFATE 1 MG/ML
INJECTION, SOLUTION INTRAVENOUS PRN
Status: DISCONTINUED | OUTPATIENT
Start: 2021-02-22 | End: 2021-02-22 | Stop reason: SDUPTHER

## 2021-02-22 RX ADMIN — Medication 0.6 MG: at 13:41

## 2021-02-22 RX ADMIN — FENTANYL CITRATE 100 MCG: 50 INJECTION, SOLUTION INTRAMUSCULAR; INTRAVENOUS at 10:19

## 2021-02-22 RX ADMIN — ROCURONIUM BROMIDE 20 MG: 10 INJECTION, SOLUTION INTRAVENOUS at 11:02

## 2021-02-22 RX ADMIN — LIDOCAINE HYDROCHLORIDE 100 MG: 20 INJECTION, SOLUTION INTRAVENOUS at 10:19

## 2021-02-22 RX ADMIN — Medication 2000 MG: at 11:33

## 2021-02-22 RX ADMIN — FENTANYL CITRATE 50 MCG: 50 INJECTION, SOLUTION INTRAMUSCULAR; INTRAVENOUS at 11:41

## 2021-02-22 RX ADMIN — TRANEXAMIC ACID 1000 MG: 1 INJECTION, SOLUTION INTRAVENOUS at 11:02

## 2021-02-22 RX ADMIN — TRANEXAMIC ACID 1000 MG: 1 INJECTION, SOLUTION INTRAVENOUS at 15:15

## 2021-02-22 RX ADMIN — HYDROMORPHONE HYDROCHLORIDE 0.5 MG: 1 INJECTION, SOLUTION INTRAMUSCULAR; INTRAVENOUS; SUBCUTANEOUS at 20:51

## 2021-02-22 RX ADMIN — HYDROMORPHONE HYDROCHLORIDE 0.5 MG: 1 INJECTION, SOLUTION INTRAMUSCULAR; INTRAVENOUS; SUBCUTANEOUS at 15:52

## 2021-02-22 RX ADMIN — SODIUM CHLORIDE: 9 INJECTION, SOLUTION INTRAVENOUS at 10:13

## 2021-02-22 RX ADMIN — ROCURONIUM BROMIDE 10 MG: 10 INJECTION, SOLUTION INTRAVENOUS at 12:55

## 2021-02-22 RX ADMIN — METOPROLOL TARTRATE 3 MG: 5 INJECTION INTRAVENOUS at 11:52

## 2021-02-22 RX ADMIN — ASPIRIN 325 MG: 325 TABLET, COATED ORAL at 20:50

## 2021-02-22 RX ADMIN — SODIUM CHLORIDE: 9 INJECTION, SOLUTION INTRAVENOUS at 07:21

## 2021-02-22 RX ADMIN — MIDAZOLAM 2 MG: 1 INJECTION INTRAMUSCULAR; INTRAVENOUS at 10:13

## 2021-02-22 RX ADMIN — SODIUM CHLORIDE: 9 INJECTION, SOLUTION INTRAVENOUS at 17:27

## 2021-02-22 RX ADMIN — NALOXONE HYDROCHLORIDE 0.08 MG: 0.4 INJECTION, SOLUTION INTRAMUSCULAR; INTRAVENOUS; SUBCUTANEOUS at 14:38

## 2021-02-22 RX ADMIN — ROCURONIUM BROMIDE 10 MG: 10 INJECTION, SOLUTION INTRAVENOUS at 13:28

## 2021-02-22 RX ADMIN — ONDANSETRON HYDROCHLORIDE 4 MG: 2 INJECTION, SOLUTION INTRAMUSCULAR; INTRAVENOUS at 13:34

## 2021-02-22 RX ADMIN — Medication 3 MG: at 13:41

## 2021-02-22 RX ADMIN — ROPIVACAINE HYDROCHLORIDE 30 ML: 5 INJECTION, SOLUTION EPIDURAL; INFILTRATION; PERINEURAL at 09:22

## 2021-02-22 RX ADMIN — LIDOCAINE HYDROCHLORIDE 2 ML: 10 INJECTION, SOLUTION EPIDURAL; INFILTRATION; INTRACAUDAL; PERINEURAL at 09:21

## 2021-02-22 RX ADMIN — FENTANYL CITRATE 50 MCG: 50 INJECTION, SOLUTION INTRAMUSCULAR; INTRAVENOUS at 10:59

## 2021-02-22 RX ADMIN — DEXAMETHASONE SODIUM PHOSPHATE 4 MG: 10 INJECTION, SOLUTION INTRAMUSCULAR; INTRAVENOUS at 09:22

## 2021-02-22 RX ADMIN — ACETAMINOPHEN 650 MG: 325 TABLET ORAL at 17:17

## 2021-02-22 RX ADMIN — FENTANYL CITRATE 50 MCG: 50 INJECTION, SOLUTION INTRAMUSCULAR; INTRAVENOUS at 11:23

## 2021-02-22 RX ADMIN — DOCUSATE SODIUM 50 MG AND SENNOSIDES 8.6 MG 1 TABLET: 8.6; 5 TABLET, FILM COATED ORAL at 20:50

## 2021-02-22 RX ADMIN — BUPIVACAINE HYDROCHLORIDE 30 ML: 5 INJECTION, SOLUTION EPIDURAL; INTRACAUDAL at 09:24

## 2021-02-22 RX ADMIN — OXYCODONE HYDROCHLORIDE 10 MG: 10 TABLET ORAL at 17:17

## 2021-02-22 RX ADMIN — MEPERIDINE HYDROCHLORIDE 12.5 MG: 25 INJECTION, SOLUTION INTRAMUSCULAR; INTRAVENOUS; SUBCUTANEOUS at 15:27

## 2021-02-22 RX ADMIN — ROCURONIUM BROMIDE 30 MG: 10 INJECTION, SOLUTION INTRAVENOUS at 10:19

## 2021-02-22 RX ADMIN — Medication 10 ML: at 20:51

## 2021-02-22 RX ADMIN — FENTANYL CITRATE 50 MCG: 50 INJECTION, SOLUTION INTRAMUSCULAR; INTRAVENOUS at 11:05

## 2021-02-22 RX ADMIN — MEPERIDINE HYDROCHLORIDE 12.5 MG: 25 INJECTION, SOLUTION INTRAMUSCULAR; INTRAVENOUS; SUBCUTANEOUS at 15:20

## 2021-02-22 RX ADMIN — ROCURONIUM BROMIDE 20 MG: 10 INJECTION, SOLUTION INTRAVENOUS at 10:34

## 2021-02-22 RX ADMIN — SODIUM CHLORIDE: 9 INJECTION, SOLUTION INTRAVENOUS at 13:18

## 2021-02-22 RX ADMIN — HYDROMORPHONE HYDROCHLORIDE 0.5 MG: 1 INJECTION, SOLUTION INTRAMUSCULAR; INTRAVENOUS; SUBCUTANEOUS at 15:45

## 2021-02-22 RX ADMIN — PROPOFOL 200 MG: 10 INJECTION, EMULSION INTRAVENOUS at 10:19

## 2021-02-22 RX ADMIN — ROCURONIUM BROMIDE 10 MG: 10 INJECTION, SOLUTION INTRAVENOUS at 11:55

## 2021-02-22 RX ADMIN — MIDAZOLAM 2 MG: 1 INJECTION INTRAMUSCULAR; INTRAVENOUS at 09:16

## 2021-02-22 ASSESSMENT — PULMONARY FUNCTION TESTS
PIF_VALUE: 36
PIF_VALUE: 37
PIF_VALUE: 32
PIF_VALUE: 34
PIF_VALUE: 35
PIF_VALUE: 3
PIF_VALUE: 34
PIF_VALUE: 35
PIF_VALUE: 33
PIF_VALUE: 33
PIF_VALUE: 34
PIF_VALUE: 34
PIF_VALUE: 3
PIF_VALUE: 33
PIF_VALUE: 35
PIF_VALUE: 35
PIF_VALUE: 33
PIF_VALUE: 6
PIF_VALUE: 2
PIF_VALUE: 34
PIF_VALUE: 33
PIF_VALUE: 22
PIF_VALUE: 36
PIF_VALUE: 3
PIF_VALUE: 36
PIF_VALUE: 33
PIF_VALUE: 35
PIF_VALUE: 0
PIF_VALUE: 34
PIF_VALUE: 6
PIF_VALUE: 34
PIF_VALUE: 33
PIF_VALUE: 33
PIF_VALUE: 39
PIF_VALUE: 0
PIF_VALUE: 36
PIF_VALUE: 36
PIF_VALUE: 33
PIF_VALUE: 33
PIF_VALUE: 36
PIF_VALUE: 35
PIF_VALUE: 5
PIF_VALUE: 3
PIF_VALUE: 32
PIF_VALUE: 34
PIF_VALUE: 36
PIF_VALUE: 33
PIF_VALUE: 35
PIF_VALUE: 36
PIF_VALUE: 33
PIF_VALUE: 33
PIF_VALUE: 36
PIF_VALUE: 33
PIF_VALUE: 33
PIF_VALUE: 34
PIF_VALUE: 35
PIF_VALUE: 34
PIF_VALUE: 3
PIF_VALUE: 29
PIF_VALUE: 7
PIF_VALUE: 36
PIF_VALUE: 11
PIF_VALUE: 35
PIF_VALUE: 36
PIF_VALUE: 5
PIF_VALUE: 34
PIF_VALUE: 33
PIF_VALUE: 33
PIF_VALUE: 20
PIF_VALUE: 5
PIF_VALUE: 32
PIF_VALUE: 2
PIF_VALUE: 33
PIF_VALUE: 34
PIF_VALUE: 33
PIF_VALUE: 3
PIF_VALUE: 3
PIF_VALUE: 6
PIF_VALUE: 33
PIF_VALUE: 3
PIF_VALUE: 36
PIF_VALUE: 3
PIF_VALUE: 37
PIF_VALUE: 34
PIF_VALUE: 35
PIF_VALUE: 3
PIF_VALUE: 34
PIF_VALUE: 33
PIF_VALUE: 4
PIF_VALUE: 21
PIF_VALUE: 36
PIF_VALUE: 35
PIF_VALUE: 32
PIF_VALUE: 3
PIF_VALUE: 36
PIF_VALUE: 31
PIF_VALUE: 37
PIF_VALUE: 37
PIF_VALUE: 33
PIF_VALUE: 39
PIF_VALUE: 32
PIF_VALUE: 37
PIF_VALUE: 38
PIF_VALUE: 35
PIF_VALUE: 33
PIF_VALUE: 3
PIF_VALUE: 32
PIF_VALUE: 34
PIF_VALUE: 33
PIF_VALUE: 3
PIF_VALUE: 32
PIF_VALUE: 35
PIF_VALUE: 33
PIF_VALUE: 37
PIF_VALUE: 3
PIF_VALUE: 36
PIF_VALUE: 36
PIF_VALUE: 35
PIF_VALUE: 34
PIF_VALUE: 33
PIF_VALUE: 3
PIF_VALUE: 34
PIF_VALUE: 38
PIF_VALUE: 33
PIF_VALUE: 33
PIF_VALUE: 36
PIF_VALUE: 33
PIF_VALUE: 35
PIF_VALUE: 33
PIF_VALUE: 35
PIF_VALUE: 33
PIF_VALUE: 37
PIF_VALUE: 33
PIF_VALUE: 5
PIF_VALUE: 5
PIF_VALUE: 38
PIF_VALUE: 5
PIF_VALUE: 38
PIF_VALUE: 3
PIF_VALUE: 36
PIF_VALUE: 33

## 2021-02-22 ASSESSMENT — PAIN SCALES - GENERAL
PAINLEVEL_OUTOF10: 7
PAINLEVEL_OUTOF10: 7
PAINLEVEL_OUTOF10: 5
PAINLEVEL_OUTOF10: 5

## 2021-02-22 ASSESSMENT — PAIN DESCRIPTION - LOCATION
LOCATION: KNEE

## 2021-02-22 ASSESSMENT — PAIN DESCRIPTION - PAIN TYPE
TYPE: SURGICAL PAIN
TYPE: SURGICAL PAIN

## 2021-02-22 ASSESSMENT — PAIN DESCRIPTION - DESCRIPTORS: DESCRIPTORS: DISCOMFORT;CONSTANT

## 2021-02-22 ASSESSMENT — PAIN DESCRIPTION - FREQUENCY: FREQUENCY: CONTINUOUS

## 2021-02-22 ASSESSMENT — PAIN DESCRIPTION - ONSET: ONSET: ON-GOING

## 2021-02-22 ASSESSMENT — PAIN DESCRIPTION - ORIENTATION: ORIENTATION: LEFT

## 2021-02-22 NOTE — INTERVAL H&P NOTE
Update History & Physical    The patient's History and Physical of February 12, 2021 was reviewed with the patient and I examined the patient. There was no change. The surgical site was confirmed by the patient and me. Plan: The risks, benefits, expected outcome, and alternative to the recommended procedure have been discussed with the patient. Patient understands and wants to proceed with the procedure.      Electronically signed by Bjorn Mcfadden MD on 2/22/2021 at 7:18 AM

## 2021-02-22 NOTE — PROGRESS NOTES
Dr Rashida Carrera notified of consult thru Service. Returned call. Will review home meds and order.

## 2021-02-22 NOTE — BRIEF OP NOTE
Brief Postoperative Note      Patient: Prosper Roberts  YOB: 1950  MRN: [id number]    Date of Procedure: 2/22/2021    Pre-Op Diagnosis: LEFT TOTAL KNEE ARTHROPLASTY ASEPTIC LOOSENING. LEFT TOTAL KNEE ARTHROPLASTY LOOSENING OF COMPONENT    Post-Op Diagnosis: Same       Procedure(s):  LEFT KNEE TOTAL ARTHROPLASTY REVISION VS. IMPLANTATION OF ANTIBIOTIC SPACER    Surgeon(s):  MD Gladys Mcgill DO    Assistant:  Physician Assistant: Lars Weinstein PA-C  Resident: Pedro Asencio DO    Anesthesia: General    Estimated Blood Loss (mL): less than 947     Complications: None    Specimens:   ID Type Source Tests Collected by Time Destination   1 : DEEP TISSUE LEFT KNEE CULTURE Tissue Joint, Knee CULTURE, ANAEROBIC, CULTURE, FUNGUS, GRAM STAIN (Canceled), CULTURE, SURGICAL, CULTURE WITH SMEAR, ACID FAST [address], MD 2/22/2021 1117    2 : DEEP TISSUE LEFT FEMUR  Tissue Joint, Knee CULTURE, ANAEROBIC, CULTURE, FUNGUS, GRAM STAIN (Canceled), CULTURE, SURGICAL, CULTURE WITH SMEAR, ACID FAST [address], MD 2/22/2021 1137    A : DEEP TISSUE LEFT KNEE ( F.S. POLYS PER HIGH POWER FIELD) Tissue Joint, Knee SURGICAL PATHOLOGY Livier Mitchell MD 2/22/2021 1113    B : LEFT TOTAL KNEE HARDWARE  Hardware Hardware SURGICAL PATHOLOGY Livier Mitchell MD 2/22/2021 1147        Implants:  Implant Name Type Inv.  Item Serial No.  Lot No. LRB No. Used Action   CEMENT BNE 20ML 41GM FULL DOSE PMMA W/ TOBRA M VISC RADPQ  CEMENT BNE 20ML 41GM FULL DOSE PMMA W/ TOBRA M VISC RADPQ  [employer] [facility] [id number] Left 2 Implanted   STEM FEM L100MM [id number] TI TOT STBL CEMENTLESS FLUT  STEM FEM L100MM [id number] TI TOT STBL CEMENTLESS FLUT  [employer] [facility] [id number] Left 1 Implanted   BASEPLATE TIB SZ 4 UNIV KNEE TRITANIUM TOT STBL SAUL  BASEPLATE TIB SZ 4 UNIV KNEE TRITANIUM TOT STBL SAUL  [employer] [facility]

## 2021-02-22 NOTE — OP NOTE
[employer] [facility] [id number] 1 Implant[facility]   BASEPLATE TIB SZ 4 UNIV KNEE TRITANIUM TOT STBL SAUL  BASEPLATE TIB SZ 4 UNIV KNEE TRITANIUM TOT STBL SAUL  [employer] [facility] [id number] Left 1 Implanted   AUGMENT TIB SZ A KNEE TRITANIUM SYMMETRICAL CONE REV  AUGMENT TIB SZ A KNEE TRITANIUM SYMMETRICAL CONE REV  [employer] [facility] [id number] Left 1 Implanted   CEMENT BNE 20ML 41GM FULL DOSE PMMA W/ TOBRA M VISC RADPQ  CEMENT BNE 20ML 41GM FULL DOSE PMMA W/ TOBRA M VISC RADPQ  [employer] [facility] [id number] Left 2 Implanted   AUGMENT FEM SZ 4 THK5MM POST KNEE CO CHROM TOT STBL FULL  AUGMENT FEM SZ 4 THK5MM POST KNEE CO CHROM TOT STBL FULL  [employer] [facility] [id number] Left 1 Implanted   INSERT TIB SZ 4 THK9MM KNEE X3 TOT STBL + TRIATHLON  INSERT TIB SZ 4 THK9MM KNEE X3 TOT STBL + TRIATHLON  [employer] [facility] [id number] Left 1 Implanted   AUGMENT FEM SZ 4 THK5MM POST KNEE CO CHROM TOT STBL FULL  AUGMENT FEM SZ 4 THK5MM POST KNEE CO CHROM TOT STBL FULL  [employer] [facility] [id number] Left 1 Implanted   STEM FEM L100MM [id number] TI TOT STBL CEMENTLESS FLUT  STEM FEM L100MM [id number] TI TOT STBL CEMENTLESS FLUT  [employer] [facility] [id number] Left 1 Implanted   COMPONENT FEM SZ 4 L KNEE TOT STBL TRIATHLON  COMPONENT FEM SZ 4 L KNEE TOT STBL TRIATHLON  [employer] [facility] [id number] Left 1 Implanted         Drains:   Urethral Catheter Double-lumen;Non-latex 16 fr (Active)       Findings: Less than 1 neutrophil per high-powered field on frozen section. No gross pus. Significant reabsorption of bone under the prosthesis on both the femoral and tibial sides. Fragmentation of tibial bone removal prosthesis. Detailed Description of Procedure:   Patient was brought to the operating room in a supine position on the hospital room bed. Patient was transferred to the operating room table in multiple individuals in a safe fashion with anesthesia and control the patient C-spine and airway. and had uncontained defects. The bone was the thickness of an eggshell around the entire metaphysis and the bone centrally had essentially reabsorbed. We cleaned this up remove the previous component. We then used bone clamps to reduce the proximal tibia so we could  the height of the joint. This point time we placed a central reamer down the tibial canal.  We then reamed up until we got appropriate tight fit. Off of this we then reamed for a metaphyseal cone. The tibial tubercle did remain intact. We then trialed with a cone in position. Once we get good rotation we then got final components with a size #4 tibial baseplate with a 12 mm x 100 mm stem and a size a metaphyseal cone. The final components were assembled the tibial plateau was cleaned and dried. The final cone was impacted into position. We then made appropriate saw cuts for the keel of the tibial component. We then placed cement around the baseplate and the metaphyseal region inserted the tibial component with the clamps in position of the proximal tibia allowed the cement to dry after cleaning excess cement. Once this dried we removed the clamps and the proximal tibia appear to be stable. At this point time we moved our attention back to the femur. We able to recreate the joint line after we had reamed the canal up to 16 mm. We then determined that the femur was a size #4 we used the centralizing reamer to place the cutting block into position. We then cut the anterior and posterior cut for 5 augment posteriorly in the anterior and posterior chamfers. This was then removed and the box cut was put in position this was taken as well. A trial femur was put in position along with a 9 mm TS [place] polyethylene liner on the tibial side. We had good patellar tracking and good stability of the knee. The trial components were removed from the tibia and the distal femur. We assembled the final distal femoral components.   We washed and cleaned the distal femur. We then placed again antibiotic cement around the femur and impacted the final component allowing the cement to dry and removing excess cement. We then placed the final polyethylene liner along with the central post.  The knee was taken through range of motion was nice and stable. The previous patellar component was in good shape and did not have lysis. The previous tibial component had significant medial sided breakdown of the polyethylene liner most likely was the reason for what I think was osteolysis of both the distal femur and proximal tibia. Once the final components in position we gave her a periarticular injection with Toradol and morphine. We soaked her knee and Betadine for 3 minutes. We then copiously irrigated dried the knee. The retinaculum was repaired with Ethibond suture including the quad snip. Once it was watertight strata fix was run for security. Is irrigated once again subcutaneous tissue was closed with 2-0 Monocryl and skin was closed with 3-0 nylon suture. Patient has sterile dressing put in position. She was taken to the PACU in stable condition. Postoperative plan:  Touchdown weightbearing left lower extremity    Hinged knee brace for left knee for at least the first 2 to 4 weeks    DVT prophylaxis in the form of Lovenox in the hospital aspirin orally as an outpatient    We will evaluate postoperative x-rays to make better weightbearing recommendations.     Electronically signed by Bjorn Mcfadden MD on 2/22/2021 at 2:14 PM

## 2021-02-22 NOTE — ANESTHESIA PROCEDURE NOTES
Peripheral Block    Patient location during procedure: pre-op  Start time: 2/22/2021 9:15 AM  End time: 2/22/2021 9:25 AM  Staffing  Performed: anesthesiologist   Anesthesiologist: Lizandro Canas MD  Other anesthesia staff: José Miguel Roland RN  Preanesthetic Checklist  Completed: patient identified, IV checked, site marked, risks and benefits discussed, surgical consent, monitors and equipment checked, pre-op evaluation, timeout performed, anesthesia consent given, oxygen available and patient being monitored  Peripheral Block  Patient position: supine  Prep: ChloraPrep  Patient monitoring: continuous pulse ox, frequent blood pressure checks and IV access  Block type: Saphenous  Laterality: left  Injection technique: single-shot  Guidance: Doppler guided  Local infiltration: lidocaine  Infiltration strength: 1 %  Dose: 0.5 mL  Provider prep: mask and sterile gloves  Local infiltration: lidocaine  Needle  Needle type: combined needle/nerve stimulator   Needle gauge: 20 G  Needle length: 4in.   Needle localization: ultrasound guidance  Assessment  Injection assessment: negative aspiration for heme  Slow fractionated injection: yes  Hemodynamics: stable  Medications Administered  Bupivacaine (MARCAINE) PF injection 0.5%, 30 mL  Reason for block: post-op pain management

## 2021-02-22 NOTE — PROGRESS NOTES
INTRAOPERATIVE CONSULTATION (with FROZEN SECTION)    Left knee deep tissue, frozen section:  Representative sections show less than 1 neutrophil per high power field.

## 2021-02-22 NOTE — ANESTHESIA PRE PROCEDURE
Department of Anesthesiology  Preprocedure Note       Name:  Maida German   Age:  65 y.o.  :  1950                                          MRN:  [id number]         Date:  2021      Surgeon: Milagro Hester):  Katheryn Guo MD    Procedure: Procedure(s):  LEFT KNEE TOTAL ARTHROPLASTY REVISION VS. IMPLANTATION OF ANTIBIOTIC SPACER---[facility]--HOLD PRE OP ANTIBIOTIC    Medications prior to admission:   Prior to Admission medications    Medication Sig Start Date End Date Taking? Authorizing Provider   Multiple Vitamins-Minerals (CENTRUM SILVER 50+WOMEN PO) Take by mouth   Yes Historical Provider, MD   buPROPion (WELLBUTRIN XL) 300 MG extended release tablet Take 1 tablet by mouth every morning 20  Yes Manan Gallegos DO   simvastatin (ZOCOR) 40 MG tablet Take 1 tablet by mouth nightly 20  Yes Manan Gallegos DO   vitamin D (CHOLECALCIFEROL) 5000 UNITS CAPS capsule Take 5,000 Units by mouth daily   Yes Historical Provider, MD   Coenzyme Q10 (CO Q 10 PO) Take 1 tablet by mouth daily. Yes Historical Provider, MD   aspirin-acetaminophen-caffeine (Tonie Gist) 743-919-93 MG per tablet Take 1 tablet by mouth every 6 hours as needed for Headaches    Historical Provider, MD   vitamin E 400 UNIT capsule Take 400 Units by mouth daily.       Historical Provider, MD       Current medications:    Current Facility-Administered Medications   Medication Dose Route Frequency Provider Last Rate Last Admin    sodium chloride flush 0.9 % injection 10 mL  10 mL Intravenous 2 times per day Lillian Luque, PA-C        sodium chloride flush 0.9 % injection 10 mL  10 mL Intravenous PRN Lillian Luque PA-CHANDRIKA        tranexamic acid (CYKLOKAPRON) 1,000 mg in dextrose 5 % 100 mL IVPB  1,000 mg Intravenous Once Lillian Luque PA-C        0.9 % sodium chloride infusion   Intravenous Continuous Lety Back  mL/hr at 21 0721 New Bag at 21 1398  ceFAZolin (ANCEF) 2000 mg in sterile water 20 mL IV syringe  2,000 mg Intravenous Once Sharmaine Nevarez PA-C           Allergies:  No Known Allergies    Problem List:    Patient Active Problem List   Diagnosis Code    Abnormal nuclear stress test R94.39    Essential hypertension I10    Primary osteoarthritis of left knee M17.12    Mixed hyperlipidemia E78.2    Fatty liver K76.0    Anxiety F41.9    Vitamin D deficiency E55.9    Class 3 severe obesity due to excess calories with serious comorbidity and body mass index (BMI) of 40.0 to 44.9 in adult ([facility] [address] E66.01, Z68.41    EKG abnormalities R94.31    Carotid stenosis, left I65.22    Pre-diabetes R73.03    Varicose veins of both lower extremities with pain I83.813    Chronic right-sided low back pain without sciatica M54.5, G89.29       Past Medical History:        Diagnosis Date    Anxiety     Basal cell carcinoma (BCC) of right shoulder     Chronic back pain     Closed avulsion fracture of distal fibula with delayed healing, left     Degenerative joint disease involving multiple joints     Diverticulitis     Hyperlipidemia     Hypertension     Essential hypertension    Liver disease     LLL pneumonia     Obesity     Osteoarthritis     Palpitations 9/17/2012    Sleep apnea     Tricuspid regurgitation     Trochanteric bursitis of right hip     Varicose veins of both lower extremities        Past Surgical History:        Procedure Laterality Date    BREAST SURGERY  1990    cyst lt breast    CARDIAC CATHETERIZATION  06/19/2019    dr Yrn Mondragon  5/2012    HYSTERECTOMY  1980    JOINT REPLACEMENT  2001    L knee    JOINT REPLACEMENT  2001    rt knee       Social History:    Social History     Tobacco Use    Smoking status: Never Smoker    Smokeless tobacco: Never Used   Substance Use Topics    Alcohol use:  Yes     Alcohol/week: 1.0 standard drinks     Types: 1 Glasses of wine per week Comment: socially                                Counseling given: Not Answered      Vital Signs (Current):   Vitals:    02/15/21 1449 02/22/21 0653 02/22/21 0704   BP:   (!) 168/81   Pulse:   107   Resp:   16   Temp:   98.5 °F (36.9 °C)   TempSrc:   Temporal   SpO2:   95%   Weight: 260 lb (117.9 kg) 260 lb (117.9 kg)    Height: 5' 5\" (1.651 m) 5' 5\" (1.651 m)                                               BP Readings from Last 3 Encounters:   02/22/21 (!) 168/81   02/16/21 (!) 175/82   01/26/21 138/83       NPO Status: Time of last liquid consumption: 2200                        Time of last solid consumption: 2200                        Date of last liquid consumption: 02/21/21                        Date of last solid food consumption: 02/21/21    BMI:   Wt Readings from Last 3 Encounters:   02/22/21 260 lb (117.9 kg)   02/16/21 260 lb (117.9 kg)   01/26/21 260 lb (117.9 kg)     Body mass index is 43.27 kg/m². CBC:   Lab Results   Component Value Date    WBC 6.4 02/16/2021    RBC 4.74 02/16/2021    HGB 14.1 02/16/2021    HCT 42.0 02/16/2021    MCV 88.6 02/16/2021    RDW 12.7 02/16/2021     02/16/2021       CMP:   Lab Results   Component Value Date     02/16/2021    K 3.9 02/16/2021     02/16/2021    CO2 27 02/16/2021    BUN 11 02/16/2021    CREATININE 0.8 02/16/2021    GFRAA >60 02/16/2021    LABGLOM >60 02/16/2021    GLUCOSE 105 02/16/2021    PROT 7.3 02/16/2021    CALCIUM 9.7 02/16/2021    BILITOT 1.0 02/16/2021    ALKPHOS 130 02/16/2021    AST 23 02/16/2021    ALT 20 02/16/2021       POC Tests: No results for input(s): POCGLU, POCNA, POCK, POCCL, POCBUN, POCHEMO, POCHCT in the last 72 hours.     Coags:   Lab Results   Component Value Date    PROTIME 12.5 02/16/2021    INR 1.1 02/16/2021       HCG (If Applicable): No results found for: PREGTESTUR, PREGSERUM, HCG, HCGQUANT     ABGs: No results found for: PHART, PO2ART, YGG4LNT, YFU6THF, BEART, R8NHJGDC     Type & Screen (If Applicable): No results found for: LABABO, LABRH    Drug/Infectious Status (If Applicable):  No results found for: HIV, HEPCAB    COVID-19 Screening (If Applicable):   Lab Results   Component Value Date    COVID19 Not Detected 02/16/2021         Anesthesia Evaluation  Patient summary reviewed no history of anesthetic complications:   Airway: Mallampati: II  TM distance: >3 FB   Neck ROM: full   Dental:          Pulmonary: breath sounds clear to auscultation  (+) sleep apnea:                             Cardiovascular:    (+) hypertension:, hyperlipidemia        Rhythm: regular  Rate: normal           Beta Blocker:  Not on Beta Blocker         Neuro/Psych:   (+) depression/anxiety             GI/Hepatic/Renal:   (+) liver disease (Fatty liver):, morbid obesity          Endo/Other:    (+) : arthritis: OA., .                  ROS comment: Pre-diabetes Abdominal:           Vascular:   + PVD, aortic or cerebral (Carotid stenosis, left), . Anesthesia Plan      regional and general     ASA 3       Induction: intravenous. MIPS: Postoperative opioids intended and Prophylactic antiemetics administered. Anesthetic plan and risks discussed with patient. Plan discussed with CRNA.                 Yuki Morgan MD   2/22/2021

## 2021-02-23 LAB
ANION GAP SERPL CALCULATED.3IONS-SCNC: 5 MMOL/L (ref 7–16)
BUN BLDV-MCNC: 14 MG/DL (ref 8–23)
CALCIUM SERPL-MCNC: 8.5 MG/DL (ref 8.6–10.2)
CHLORIDE BLD-SCNC: 103 MMOL/L (ref 98–107)
CO2: 27 MMOL/L (ref 22–29)
CREAT SERPL-MCNC: 0.9 MG/DL (ref 0.5–1)
GFR AFRICAN AMERICAN: >60
GFR NON-AFRICAN AMERICAN: >60 ML/MIN/1.73
GLUCOSE BLD-MCNC: 115 MG/DL (ref 74–99)
GRAM STAIN ORDERABLE: NORMAL
GRAM STAIN ORDERABLE: NORMAL
HCT VFR BLD CALC: 34.4 % (ref 34–48)
HEMOGLOBIN: 11.1 G/DL (ref 11.5–15.5)
MCH RBC QN AUTO: 29.4 PG (ref 26–35)
MCHC RBC AUTO-ENTMCNC: 32.3 % (ref 32–34.5)
MCV RBC AUTO: 91.2 FL (ref 80–99.9)
PDW BLD-RTO: 13.1 FL (ref 11.5–15)
PLATELET # BLD: 222 E9/L (ref 130–450)
PMV BLD AUTO: 11.5 FL (ref 7–12)
POTASSIUM REFLEX MAGNESIUM: 4.1 MMOL/L (ref 3.5–5)
RBC # BLD: 3.77 E12/L (ref 3.5–5.5)
SODIUM BLD-SCNC: 135 MMOL/L (ref 132–146)
WBC # BLD: 11.8 E9/L (ref 4.5–11.5)

## 2021-02-23 PROCEDURE — 6370000000 HC RX 637 (ALT 250 FOR IP): Performed by: INTERNAL MEDICINE

## 2021-02-23 PROCEDURE — 2500000003 HC RX 250 WO HCPCS: Performed by: STUDENT IN AN ORGANIZED HEALTH CARE EDUCATION/TRAINING PROGRAM

## 2021-02-23 PROCEDURE — 97161 PT EVAL LOW COMPLEX 20 MIN: CPT

## 2021-02-23 PROCEDURE — 80048 BASIC METABOLIC PNL TOTAL CA: CPT

## 2021-02-23 PROCEDURE — 6360000002 HC RX W HCPCS: Performed by: STUDENT IN AN ORGANIZED HEALTH CARE EDUCATION/TRAINING PROGRAM

## 2021-02-23 PROCEDURE — 2580000003 HC RX 258: Performed by: STUDENT IN AN ORGANIZED HEALTH CARE EDUCATION/TRAINING PROGRAM

## 2021-02-23 PROCEDURE — 36415 COLL VENOUS BLD VENIPUNCTURE: CPT

## 2021-02-23 PROCEDURE — 85027 COMPLETE CBC AUTOMATED: CPT

## 2021-02-23 PROCEDURE — 1200000000 HC SEMI PRIVATE

## 2021-02-23 PROCEDURE — 97165 OT EVAL LOW COMPLEX 30 MIN: CPT

## 2021-02-23 PROCEDURE — 97530 THERAPEUTIC ACTIVITIES: CPT

## 2021-02-23 PROCEDURE — 6370000000 HC RX 637 (ALT 250 FOR IP): Performed by: STUDENT IN AN ORGANIZED HEALTH CARE EDUCATION/TRAINING PROGRAM

## 2021-02-23 PROCEDURE — 2700000000 HC OXYGEN THERAPY PER DAY

## 2021-02-23 RX ADMIN — OXYCODONE HYDROCHLORIDE 10 MG: 10 TABLET ORAL at 00:07

## 2021-02-23 RX ADMIN — HYDROMORPHONE HYDROCHLORIDE 0.5 MG: 1 INJECTION, SOLUTION INTRAMUSCULAR; INTRAVENOUS; SUBCUTANEOUS at 18:03

## 2021-02-23 RX ADMIN — ACETAMINOPHEN 650 MG: 325 TABLET ORAL at 18:03

## 2021-02-23 RX ADMIN — ACETAMINOPHEN 650 MG: 325 TABLET ORAL at 04:49

## 2021-02-23 RX ADMIN — DOCUSATE SODIUM 50 MG AND SENNOSIDES 8.6 MG 1 TABLET: 8.6; 5 TABLET, FILM COATED ORAL at 08:51

## 2021-02-23 RX ADMIN — ACETAMINOPHEN 650 MG: 325 TABLET ORAL at 00:06

## 2021-02-23 RX ADMIN — OXYCODONE HYDROCHLORIDE 10 MG: 10 TABLET ORAL at 14:41

## 2021-02-23 RX ADMIN — SODIUM CHLORIDE: 9 INJECTION, SOLUTION INTRAVENOUS at 14:51

## 2021-02-23 RX ADMIN — OXYCODONE HYDROCHLORIDE 10 MG: 10 TABLET ORAL at 04:49

## 2021-02-23 RX ADMIN — OXYCODONE HYDROCHLORIDE 10 MG: 10 TABLET ORAL at 20:59

## 2021-02-23 RX ADMIN — OXYCODONE HYDROCHLORIDE 10 MG: 10 TABLET ORAL at 10:25

## 2021-02-23 RX ADMIN — Medication 2000 MG: at 00:58

## 2021-02-23 RX ADMIN — DOCUSATE SODIUM 50 MG AND SENNOSIDES 8.6 MG 1 TABLET: 8.6; 5 TABLET, FILM COATED ORAL at 20:59

## 2021-02-23 RX ADMIN — BUPROPION HYDROCHLORIDE 300 MG: 300 TABLET, FILM COATED, EXTENDED RELEASE ORAL at 08:51

## 2021-02-23 RX ADMIN — ASPIRIN 325 MG: 325 TABLET, COATED ORAL at 20:59

## 2021-02-23 RX ADMIN — Medication 2000 MG: at 06:17

## 2021-02-23 RX ADMIN — Medication 2000 MG: at 14:40

## 2021-02-23 RX ADMIN — ACETAMINOPHEN 650 MG: 325 TABLET ORAL at 10:24

## 2021-02-23 RX ADMIN — ASPIRIN 325 MG: 325 TABLET, COATED ORAL at 08:51

## 2021-02-23 ASSESSMENT — PAIN SCALES - GENERAL
PAINLEVEL_OUTOF10: 3
PAINLEVEL_OUTOF10: 4
PAINLEVEL_OUTOF10: 7
PAINLEVEL_OUTOF10: 4
PAINLEVEL_OUTOF10: 7

## 2021-02-23 ASSESSMENT — PAIN DESCRIPTION - LOCATION: LOCATION: KNEE

## 2021-02-23 ASSESSMENT — PAIN DESCRIPTION - ORIENTATION
ORIENTATION: LEFT
ORIENTATION: LEFT

## 2021-02-23 ASSESSMENT — PAIN DESCRIPTION - PAIN TYPE
TYPE: SURGICAL PAIN
TYPE: SURGICAL PAIN

## 2021-02-23 ASSESSMENT — PAIN DESCRIPTION - DESCRIPTORS
DESCRIPTORS: DISCOMFORT;ACHING
DESCRIPTORS: ACHING

## 2021-02-23 ASSESSMENT — PAIN DESCRIPTION - FREQUENCY
FREQUENCY: CONTINUOUS
FREQUENCY: CONTINUOUS

## 2021-02-23 NOTE — PROGRESS NOTES
Department of Orthopedic Surgery  Resident Progress Note  Patient seen and examined at bedside this morning. Her pain to the left knee has been controlled. No chest pain or shortness of breath. She is on nasal cannula oxygen. No nausea or vomiting. No fevers or chills. No numbness or tingling. VITALS:  BP (!) 105/50   Pulse 83   Temp 97.8 °F (36.6 °C) (Temporal)   Resp 19   Ht 5' 5\" (1.651 m)   Wt 260 lb (117.9 kg)   SpO2 94%   BMI 43.27 kg/m²     General: alert and oriented to person, place and time, well-developed and well-nourished, in no acute distress and alert and oriented to person, place and time    MUSCULOSKELETAL:   left lower extremity:  · Knee immobilizer, ACE dressing in place, clean dry and intact  · Compartments soft and compressible  · +PF/DF/EHL  · +2/4 DP & PT pulses, Brisk Cap refill, Toes warm and perfused  · Distal sensation grossly intact to Peroneals, Sural, Saphenous, and tibial nrs    CBC:   Lab Results   Component Value Date    WBC 11.8 02/23/2021    HGB 11.1 02/23/2021    HCT 34.4 02/23/2021     02/23/2021     PT/INR:    Lab Results   Component Value Date    PROTIME 12.5 02/16/2021    INR 1.1 02/16/2021       Intraop Cultures: pending    ASSESSMENT  · S/P revision left total knee arthroplasty    PLAN      · Continue physical therapy and protocol:  Toe-touch weightbearing- LLE, knee immobilizer, hinged knee brace ordered  · 24 hour abx coverage  · Deep venous thrombosis prophylaxis - aspirin 325 twice daily, early mobilization  · PT/OT  · Pain Control: IV and PO  · Monitor H&H 11.1  · D/C Plan: PT/OT/SW

## 2021-02-23 NOTE — PROGRESS NOTES
Physical Therapy  Physical Therapy Initial Assessment     Name: Jacqui Randhawa  : 1950  MRN: [id number]    Referring Provider:  Mima Simpson DO    Date of Service: 2021    Evaluating PT:  Charissa Gilman PT, DPT PT [id number]    Room #:  1527/6624-Z  Diagnosis:  LEFT TOTAL KNEE ARTHROPLASTY ASEPTIC INFECTION. LEFT TOTAL KNEE ARTHROPLASTY LOOSENING OF COMPONENT     PMHx/PSHx: Anxiety, Chronic Back Pain, HLD, HTN, Liver Disease, Obesity, Tricuspid Regurgi    Procedure/Surgery:  Revision left total knee arthroplasty with explantation of previous femoral and tibial component and revision of both the femoral and entire tibial component 21  Precautions:  TTWB LLE, Knee Immobilizer Transition to Hinged Knee Brace (0-30), O2  Equipment Needs:  TBD    SUBJECTIVE:    Pt lives with spouse in a [facility] home with 3 stairs to enter with single rail. Pt ambulated with no device independently PTA. Equipment Owned: [facility] [place]    OBJECTIVE:   Initial Evaluation  Date: 21 Treatment Short Term/ Long Term   Goals   AM-PAC 6 Clicks 84/91     Was pt agreeable to Eval/treatment? Yes     Does pt have pain? Moderate pain L Knee     Bed Mobility  Rolling: NT  Supine to sit: MaxA  Sit to supine: NT  Scooting: ModA  Rolling: Independent    Supine to sit:  Independent    Sit to supine: Independent    Scooting: Independent     Transfers Sit to stand: [address]  Stand to sit: ModA  Stand pivot: ModA x 2 [employer]  Sit to stand: Modified Independent  Stand to sit: Modified Independent  Stand pivot: Modified Independent     Ambulation    NT- unable weakness  >25 feet with Modified Independent  [employer]   Stair negotiation: ascended and descended  NT  >4 steps with single rail Modified Independent  AAD   ROM BUE:  See OT Note  BLE:  WFL  KI to LLE     Strength BUE:  See OT Note  LLE: 2/5 hip  3/5 ankle  RLE: 4/5  Improve Strength 1/3 MMT Grade   Balance Sitting EOB:  SBA  Dynamic Standing:  ModA x 2 [employer]  Sitting EOB:  Independent    Dynamic Standing:  Modified Independent  AAD     Pt is A & O x 4  Sensation:  Pt denies numbness and tingling to extremities  Edema: WNL    Vitals:  HR 70  Spo2 92% 1.5 L O2  PRE  HR 70  Spo2 92% 1.5 L O2  POST        Patient education  Pt educated on role of PT    Patient response to education:   Pt verbalized understanding Pt demonstrated skill Pt requires further education in this area   x x x     ASSESSMENT:    Comments:  Pt received in supine agreeable to PT evaluation. Pt requiring assistance of LLE for bed mobility. Pt requiring assistance and cues for functional transfers. Pt unable to ambulate due to weakness. Pt performing stand pivot transfer. Patient would benefit from continued skilled PT to maximize functional mobility independence. Treatment:  Patient practiced and was instructed in the following treatment:     Bed mobility- verbal cues for positioning and sequencing to facilitate independence   Functional transfers-Verbal cues for proper positioning and sequencing to perform transfers safely with maximum independence. Pt's/ family goals   1. Get better    Patient and or family understand(s) diagnosis, prognosis, and plan of care. yes    PLAN:      PLAN OF CARE:    Current Treatment Recommendations     [x] Strengthening     [x] ROM   [x] Balance Training   [x] Endurance Training   [x] Transfer Training   [x] Gait Training   [x] Stair Training   [x] Positioning   [x] Safety and Education Training   [x] Patient/Caregiver Education   [x] HEP  [] Other       PT care will be provided in accordance with the objectives noted above. Exercises and functional mobility practice will be used as well as appropriate assistive devices or modalities to obtain goals. Patient and family education will also be administered as needed. Frequency of treatments: 5-7x/week x 1-2 weeks.     Time in  0904  Time out  0927    Total Treatment Time  10 minutes     Evaluation Time includes thorough review of current medical information, gathering information on past medical history/social history and prior level of function, completion of standardized testing/informal observation of tasks, assessment of data and education on plan of care and goals.     CPT codes:  [x] Low Complexity PT evaluation 95716  [] Moderate Complexity PT evaluation 01575  [] High Complexity PT evaluation 40874  [] PT Re-evaluation 24562  [] Gait training 57805 0 minutes  [] Manual therapy 93054 0 minutes  [x] Therapeutic activities 16012 10 minutes  [] Therapeutic exercises 50292 0 minutes  [] Neuromuscular reeducation 82161 0 minutes       Maria Travis PT, DPT   [id number]

## 2021-02-23 NOTE — PROGRESS NOTES
Irwin removed per facility protocol. Pt educated on the importance of notifying staff that she needs to go the bathroom to get assistance to the bedside commode as well as continuing to stay hydrated.  Due to void at 2018 - 4490

## 2021-02-23 NOTE — PROGRESS NOTES
OCCUPATIONAL THERAPY INITIAL EVALUATION      Date:2021  Patient Name: Ariana Nichols  MRN: [id number]  : 1950  Room: [address] [place] [address]  Referring Provider: Kenia Collins DO     Evaluating OT: Dimas Julien OTR/L   License #  [id number]     AM-PAC Daily Activity Raw Score: 15    Recommended Adaptive Equipment:  TBD     Diagnosis: LEFT TOTAL KNEE ARTHROPLASTY ASEPTIC INFECTION. LEFT TOTAL KNEE ARTHROPLASTY LOOSENING OF COMPONENT     Surgery: 21:  Revision left total knee arthroplasty with explantation of previous femoral and tibial component and revision of both the femoral and entire tibial component    Pertinent Medical History:   Past Medical History:   Diagnosis Date    Anxiety     Basal cell carcinoma (BCC) of right shoulder     Chronic back pain     Closed avulsion fracture of distal fibula with delayed healing, left     Degenerative joint disease involving multiple joints     Diverticulitis     Hyperlipidemia     Hypertension     Essential hypertension    Liver disease     LLL pneumonia     Obesity     Osteoarthritis     Palpitations 2012    Sleep apnea     Tricuspid regurgitation     Trochanteric bursitis of right hip     Varicose veins of both lower extremities       Precautions:  Falls, TTWB L LE, L KI with transition to Hinged knee brace 0-30, supplemental O2 at 1.5 L via NC     Home Living: Pt lives spouse (retired & able to assist) in a 1 1/2 story home with 3 steps to enter and 1 HR. Bathroom setup: tub/shower, std. commode   Equipment owned: ww, sp cane    Prior Level of Function: Ind. with ADLs , Ind. with IADLs; ambulated no A.D.   Driving: active  Occupation: retired from [employer]    Pain Level: 7/10 L knee  Cognition: A&O: 4/4; Follows multi- step directions   Memory:  good   Sequencing:  good   Problem solving:  good   Judgement/safety:  Fair/good     Functional Assessment:   Initial Eval Status  Date: 2021 Treatment Status  Date: Short Term Goals = Long Term Goals  Treatment frequency: 3-5 days   Feeding Independent       Grooming Set up seated  Modified King    UB Dressing SBA seated EOB  Modified [place]    LB Dressing Maximal Assist with socks  & L knee brace  Modified King    Bathing Mod A with sim. Task seated  Modified [place]    Toileting NT, chung  Modified King    Bed Mobility  Supine to sit: Max A  Sit to supine: NT   Supine to sit: Modified King   Sit to supine: Modified King    Functional Transfers Mod A sit <> stand with ww. Mod A x2 with SPT using ww. Pt. Maintained NWB on L LE  Modified King    Functional Mobility Mod A x2 with ww few shuffle steps towards bedside chair, pt. Maintained NWB on L LE  Modified King    Balance Sitting:     Static:  SUP    Dynamic:SBA  Standing: Mod A x2     Activity Tolerance Fair with lt. Ax.  spO2 & HR remained WFL on 1.5 L O2  Good with lt/mod. Ax. Visual/  Perceptual Glasses: yes        Safety Awareness Fair/good                    good     Hand dominance: R     Strength ROM Additional Info:    RUE  4+/5  WFL good  and wfl FMC/dexterity noted during ADL tasks     LUE 4+/5  WFL good  and wfl FMC/dexterity noted during ADL tasks       Hearing: WFL   Sensation:  Pt. c/o no numbness/ tingling B UE  Tone: WFL   Edema: none noted B UE                            Comments: Upon arrival, patient supine in bed, cleared by Nursing, agreeable to OT. Pt demonstrating good understanding of education/techniques, requiring additional training / education. At end of session, patient seated in bedside chair, all needs met, RN notified, with call light and phone within reach, all lines and tubes intact. Pt would benefit from continued skilled OT to increase safety and independence with completion of ADL/iADL tasks, functional transfers/mobility to improve independence and quality of life.     Treatment:  OT services provided include: facilitation of safe ADLs/functional transfers/mobility with focus on safety, technique, precautions, skilled monitoring of vitals & pt.'s response to treatment, instruction/training on safe & adapted techniques within precautions for completion of ADLs, proper posture and/or positioning, facilitation of functional seated & standing tolerance & balance ax. during ADLs and functional ax., skilled cuing on hand placement & proper body mechanics during functional transfer/mobility training with focus on safety, technique, precautions. Pt.  also Instructed RE: safe transfers/mobility, ADL training, role of OT, E.C. techniques, treatment plan, recs. , prec. Eval Complexity: Low    Assessment of current deficits:    Functional mobility [x]  ADLs [x] Strength [x]  Cognition []  Functional transfers  [x] IADLs [x] Safety Awareness [x]  Endurance [x]  Fine Motor Coordination [] Balance [x] Vision/perception [] Sensation []   Gross Motor Coordination [] ROM [] Delirium []                  Motor Control []   Plan of Care: 2-4 days/week for 1-2 weeks PRN   ADL retraining/adapted techniques and AE recommendations to increase functional independence within precautions                    Energy conservation techniques to improve tolerance for selfcare routine   Functional transfer/mobility training/DME recommendations for increased independence, safety and fall prevention         Patient/family education to increase safety and functional independence             Environmental modifications for safe mobility and completion of ADLs                          Splinting/positioning needs to maintain joint/skin integrity and prevent contractures  Therapeutic activity to improve functional performance during ADLs. Therapeutic exercise to improve tolerance and functional strength for ADLs   Balance retraining/tolerance tasks for facilitation of postural control with dynamic challenges during ADLs .     Rehab Potential: Good for established goals     Patient / Family Goal: to return home     Patient and/or family were instructed on functional diagnosis, prognosis/goals and OT plan of care. Demonstrated good understanding. Eval Complexity: Low      Time In: 9:00  Time Out: 9:25  Total Treatment Time: 15    Min Units   OT Eval Low 99252 x     OT Eval Medium 00764      OT Eval High 54043       OT Re-Eval [id number]       Therapeutic Ex 28457       Therapeutic Activities 98479  10  1   ADL/Self Care 85803  05     Orthotic Management 70333       Neuro Re-Ed 36538       Non-Billable Time          Evaluation Time includes thorough review of current medical information, gathering information on past medical history/social history and prior level of function, completion of standardized testing/informal observation of tasks, assessment of data and education on plan of care and goals. Sisi Julien, OTR/L   License #  [id number]

## 2021-02-23 NOTE — CARE COORDINATION
2/23/2021 social work transition of care planning  Pt is from home with spouse. Pt reported that she has a cane,w/w, and s/c at home. Pt pcp is Dr Caprice Arthur and pharmacy is [facility] on [address]. Pt has hx of rehab in [place], no hx of [facility]. Explained sw role in transition of care planning. Pt would like Acute rehab-sw requested pmr consult. If not eligible for acute,pt wants to retrun home with [facility]-would like [facility]-sw will make referral. Sw will follow.   Electronically signed by Harold Phoenix, LSW on 2/23/2021 at 11:22 AM

## 2021-02-23 NOTE — PROGRESS NOTES
Per Dr Zackery Jack, patient appropriate for ARU. Will accept pending medical stability, completed testing, negative covid and bed availability.

## 2021-02-23 NOTE — CONSULTS
[address]                  [address] [facility],  [facility]                                  CONSULTATION    PATIENT NAME: Yamilet Alves               :        1950  MED REC NO:   [id number]                            ROOM:       3022  ACCOUNT NO:   [de-identified]                           ADMIT DATE: 2021  PROVIDER:     Kaleigh Ricardo DO    CONSULT DATE:  2021    MEDICAL CONSULTATION    ADMITTING PROVIDER:  Carlos Alberto Felipe MD    REASON FOR CONSULTATION:  Medical management. PRIMARY CARE PROVIDER:  Adam Chan DO    PAST MEDICAL HISTORY:  Skin cancer, anxiety, osteoarthritis,  hyperlipidemia, obesity, lower extremity varicose veins. PAST SURGICAL HISTORY:  Left breast cyst surgery, cardiac  catheterization, cholecystectomy, hysterectomy, left and right total  knee replacement, right Achilles tendon surgery. SOCIAL HISTORY:  No tobacco.  Social alcohol. REVIEW OF SYSTEMS:  Remarkable for above-stated chief complaint plus  allergies none known. CHIEF COMPLAINT:  Postop day #1 status post revision left total knee  arthroplasty. HISTORY OF PRESENT ILLNESS:  The patient is a 68-year-old   female who was admitted to the hospital on 2021 after undergoing a  revision of total knee arthroplasty per Dr. Trice Freed. PHYSICAL EXAMINATION:  GENERAL APPEARANCE:  Reveals a 68-year-old  female who is alert  and oriented x3, cooperative and a good historian. VITAL SIGNS:  Temperature 97.8, pulse 83, respirations 19, blood  pressure 105/50. HEENT:  Head:  Normocephalic, atraumatic. Eyes:  Pupils are equal and  reactive to light. Extraocular muscles intact. Fundi not well  visualized. Nose:  No obstruction, polyp or discharge noted. Mouth:   Mucosa without lesion. Pharynx:  Noninjected without exudate. NECK:  Supple. No JVD. No thyromegaly. No carotid bruits. HEART:  Regular rate and rhythm without murmur. LUNGS:  Clear to auscultation bilaterally. ABDOMEN:  Positive bowel sounds, soft, nontender. No rebound or  guarding. No hepatosplenomegaly. No masses. BACK:  With minimal increased thoracic kyphosis. EXTREMITIES:  With minimal edema in the bilateral lower extremities. LYMPH NODES:  No adenopathy noted. SKIN:  Without rash or lesion. IMPRESSION:  Postop day #1 status post revision left total knee  arthroplasty, hyperlipidemia, anxiety, osteoarthritis, morbid obesity. PLAN:  Continue postop care. Serial lab. Pain control as per Ortho. Discharge plan home when stable.         Pieter Hamman, DO    D: 02/23/2021 8:31:55       T: 02/23/2021 8:37:09     [id number]  Job#: [id number]     Doc#: [id number]    CC:

## 2021-02-23 NOTE — ANESTHESIA POSTPROCEDURE EVALUATION
Department of Anesthesiology  Postprocedure Note    Patient: Xiao Bello  MRN: [id number]  YOB: 1950  Date of evaluation: 2/23/2021  Time:  6:12 AM     Procedure Summary     Date: 02/22/21 Room / Location: [facility] OR 08 / [facility]    Anesthesia Start: 8094 Anesthesia Stop: 4465    Procedure: LEFT KNEE TOTAL ARTHROPLASTY REVISION (Left Knee) Diagnosis: (LEFT TOTAL KNEE ARTHROPLASTY ASEPTIC INFECTION. LEFT TOTAL KNEE ARTHROPLASTY LOOSENING OF COMPONENT)    Surgeons: Aretha Kraus MD Responsible Provider: Rosalio Negrete MD    Anesthesia Type: regional, general ASA Status: 3          Anesthesia Type: regional, general    Lazarus Phase I: Lazarus Score: 9    Lazarus Phase II:      Last vitals: Reviewed and per EMR flowsheets.        Anesthesia Post Evaluation    Patient location during evaluation: PACU  Patient participation: complete - patient participated  Level of consciousness: awake and alert  Airway patency: patent  Nausea & Vomiting: no nausea and no vomiting  Complications: no  Cardiovascular status: hemodynamically stable  Respiratory status: acceptable  Hydration status: euvolemic

## 2021-02-24 ENCOUNTER — HOSPITAL ENCOUNTER (INPATIENT)
Age: 71
LOS: 13 days | Discharge: HOME | DRG: 560 | End: 2021-03-09
Attending: PHYSICAL MEDICINE & REHABILITATION | Admitting: PHYSICAL MEDICINE & REHABILITATION
Payer: MEDICARE

## 2021-02-24 VITALS
HEIGHT: 65 IN | HEART RATE: 101 BPM | OXYGEN SATURATION: 95 % | SYSTOLIC BLOOD PRESSURE: 149 MMHG | RESPIRATION RATE: 18 BRPM | TEMPERATURE: 99.5 F | DIASTOLIC BLOOD PRESSURE: 65 MMHG | BODY MASS INDEX: 43.32 KG/M2 | WEIGHT: 260 LBS

## 2021-02-24 DIAGNOSIS — M17.12 PRIMARY OSTEOARTHRITIS OF LEFT KNEE: Chronic | ICD-10-CM

## 2021-02-24 DIAGNOSIS — Z96.659 HISTORY OF REVISION OF TOTAL KNEE ARTHROPLASTY: Primary | ICD-10-CM

## 2021-02-24 LAB
ANION GAP SERPL CALCULATED.3IONS-SCNC: 5 MMOL/L (ref 7–16)
BUN BLDV-MCNC: 9 MG/DL (ref 8–23)
CALCIUM SERPL-MCNC: 8.3 MG/DL (ref 8.6–10.2)
CHLORIDE BLD-SCNC: 103 MMOL/L (ref 98–107)
CO2: 27 MMOL/L (ref 22–29)
CREAT SERPL-MCNC: 0.7 MG/DL (ref 0.5–1)
GFR AFRICAN AMERICAN: >60
GFR NON-AFRICAN AMERICAN: >60 ML/MIN/1.73
GLUCOSE BLD-MCNC: 118 MG/DL (ref 74–99)
HCT VFR BLD CALC: 32.6 % (ref 34–48)
HEMOGLOBIN: 10.6 G/DL (ref 11.5–15.5)
MCH RBC QN AUTO: 29.9 PG (ref 26–35)
MCHC RBC AUTO-ENTMCNC: 32.5 % (ref 32–34.5)
MCV RBC AUTO: 91.8 FL (ref 80–99.9)
PDW BLD-RTO: 13 FL (ref 11.5–15)
PLATELET # BLD: 196 E9/L (ref 130–450)
PMV BLD AUTO: 11.2 FL (ref 7–12)
POTASSIUM SERPL-SCNC: 4 MMOL/L (ref 3.5–5)
RBC # BLD: 3.55 E12/L (ref 3.5–5.5)
SARS-COV-2, NAAT: NOT DETECTED
SODIUM BLD-SCNC: 135 MMOL/L (ref 132–146)
WBC # BLD: 9 E9/L (ref 4.5–11.5)

## 2021-02-24 PROCEDURE — 2700000000 HC OXYGEN THERAPY PER DAY

## 2021-02-24 PROCEDURE — 6370000000 HC RX 637 (ALT 250 FOR IP): Performed by: INTERNAL MEDICINE

## 2021-02-24 PROCEDURE — 6370000000 HC RX 637 (ALT 250 FOR IP): Performed by: STUDENT IN AN ORGANIZED HEALTH CARE EDUCATION/TRAINING PROGRAM

## 2021-02-24 PROCEDURE — 2580000003 HC RX 258: Performed by: STUDENT IN AN ORGANIZED HEALTH CARE EDUCATION/TRAINING PROGRAM

## 2021-02-24 PROCEDURE — 97530 THERAPEUTIC ACTIVITIES: CPT

## 2021-02-24 PROCEDURE — 80048 BASIC METABOLIC PNL TOTAL CA: CPT

## 2021-02-24 PROCEDURE — 1280000000 HC REHAB R&B

## 2021-02-24 PROCEDURE — 85027 COMPLETE CBC AUTOMATED: CPT

## 2021-02-24 PROCEDURE — 87635 SARS-COV-2 COVID-19 AMP PRB: CPT

## 2021-02-24 PROCEDURE — 97535 SELF CARE MNGMENT TRAINING: CPT

## 2021-02-24 PROCEDURE — 36415 COLL VENOUS BLD VENIPUNCTURE: CPT

## 2021-02-24 PROCEDURE — 51798 US URINE CAPACITY MEASURE: CPT

## 2021-02-24 PROCEDURE — L1832 KO ADJ JNT POS R SUP PRE CST: HCPCS

## 2021-02-24 RX ORDER — OXYCODONE HYDROCHLORIDE 5 MG/1
5 TABLET ORAL EVERY 4 HOURS PRN
Status: CANCELLED | OUTPATIENT
Start: 2021-02-24

## 2021-02-24 RX ORDER — ONDANSETRON 2 MG/ML
4 INJECTION INTRAMUSCULAR; INTRAVENOUS EVERY 6 HOURS PRN
Status: DISCONTINUED | OUTPATIENT
Start: 2021-02-24 | End: 2021-02-24

## 2021-02-24 RX ORDER — OXYCODONE HYDROCHLORIDE 10 MG/1
10 TABLET ORAL EVERY 4 HOURS PRN
Status: CANCELLED | OUTPATIENT
Start: 2021-02-24

## 2021-02-24 RX ORDER — SODIUM CHLORIDE 9 MG/ML
INJECTION, SOLUTION INTRAVENOUS CONTINUOUS
Status: CANCELLED | OUTPATIENT
Start: 2021-02-24

## 2021-02-24 RX ORDER — SENNA AND DOCUSATE SODIUM 50; 8.6 MG/1; MG/1
1 TABLET, FILM COATED ORAL 2 TIMES DAILY
Status: CANCELLED | OUTPATIENT
Start: 2021-02-24

## 2021-02-24 RX ORDER — OXYCODONE HYDROCHLORIDE 5 MG/1
5 TABLET ORAL EVERY 4 HOURS PRN
Status: DISCONTINUED | OUTPATIENT
Start: 2021-02-24 | End: 2021-03-09 | Stop reason: HOSPADM

## 2021-02-24 RX ORDER — ACETAMINOPHEN 325 MG/1
650 TABLET ORAL EVERY 6 HOURS
Status: CANCELLED | OUTPATIENT
Start: 2021-02-24

## 2021-02-24 RX ORDER — SIMVASTATIN 10 MG
40 TABLET ORAL NIGHTLY
Status: DISCONTINUED | OUTPATIENT
Start: 2021-02-24 | End: 2021-02-25 | Stop reason: SDUPTHER

## 2021-02-24 RX ORDER — ACETAMINOPHEN 325 MG/1
650 TABLET ORAL EVERY 6 HOURS
Status: DISCONTINUED | OUTPATIENT
Start: 2021-02-24 | End: 2021-03-09 | Stop reason: HOSPADM

## 2021-02-24 RX ORDER — OXYCODONE HYDROCHLORIDE 10 MG/1
10 TABLET ORAL EVERY 4 HOURS PRN
Status: DISCONTINUED | OUTPATIENT
Start: 2021-02-24 | End: 2021-03-09 | Stop reason: HOSPADM

## 2021-02-24 RX ORDER — SENNA AND DOCUSATE SODIUM 50; 8.6 MG/1; MG/1
1 TABLET, FILM COATED ORAL 2 TIMES DAILY
Status: DISCONTINUED | OUTPATIENT
Start: 2021-02-24 | End: 2021-03-09 | Stop reason: HOSPADM

## 2021-02-24 RX ORDER — POLYETHYLENE GLYCOL 3350 17 G/17G
17 POWDER, FOR SOLUTION ORAL DAILY PRN
Status: DISCONTINUED | OUTPATIENT
Start: 2021-02-24 | End: 2021-03-09 | Stop reason: HOSPADM

## 2021-02-24 RX ORDER — BUPROPION HYDROCHLORIDE 300 MG/1
300 TABLET ORAL EVERY MORNING
Status: DISCONTINUED | OUTPATIENT
Start: 2021-02-25 | End: 2021-03-09 | Stop reason: HOSPADM

## 2021-02-24 RX ORDER — SODIUM CHLORIDE 0.9 % (FLUSH) 0.9 %
10 SYRINGE (ML) INJECTION EVERY 12 HOURS SCHEDULED
Status: DISCONTINUED | OUTPATIENT
Start: 2021-02-24 | End: 2021-03-09 | Stop reason: HOSPADM

## 2021-02-24 RX ORDER — SODIUM CHLORIDE 0.9 % (FLUSH) 0.9 %
10 SYRINGE (ML) INJECTION PRN
Status: CANCELLED | OUTPATIENT
Start: 2021-02-24

## 2021-02-24 RX ORDER — SODIUM CHLORIDE 9 MG/ML
INJECTION, SOLUTION INTRAVENOUS CONTINUOUS
Status: DISCONTINUED | OUTPATIENT
Start: 2021-02-24 | End: 2021-02-24

## 2021-02-24 RX ORDER — SIMVASTATIN 10 MG
40 TABLET ORAL NIGHTLY
Status: CANCELLED | OUTPATIENT
Start: 2021-02-24

## 2021-02-24 RX ORDER — ONDANSETRON 4 MG/1
4 TABLET, ORALLY DISINTEGRATING ORAL EVERY 8 HOURS PRN
Status: CANCELLED | OUTPATIENT
Start: 2021-02-24

## 2021-02-24 RX ORDER — SODIUM CHLORIDE 0.9 % (FLUSH) 0.9 %
10 SYRINGE (ML) INJECTION EVERY 12 HOURS SCHEDULED
Status: CANCELLED | OUTPATIENT
Start: 2021-02-24

## 2021-02-24 RX ORDER — ONDANSETRON 2 MG/ML
4 INJECTION INTRAMUSCULAR; INTRAVENOUS EVERY 6 HOURS PRN
Status: CANCELLED | OUTPATIENT
Start: 2021-02-24

## 2021-02-24 RX ORDER — ONDANSETRON 4 MG/1
4 TABLET, ORALLY DISINTEGRATING ORAL EVERY 8 HOURS PRN
Status: DISCONTINUED | OUTPATIENT
Start: 2021-02-24 | End: 2021-03-09 | Stop reason: HOSPADM

## 2021-02-24 RX ORDER — BUPROPION HYDROCHLORIDE 300 MG/1
300 TABLET ORAL EVERY MORNING
Status: CANCELLED | OUTPATIENT
Start: 2021-02-25

## 2021-02-24 RX ORDER — SODIUM CHLORIDE 0.9 % (FLUSH) 0.9 %
10 SYRINGE (ML) INJECTION PRN
Status: DISCONTINUED | OUTPATIENT
Start: 2021-02-24 | End: 2021-03-09 | Stop reason: HOSPADM

## 2021-02-24 RX ADMIN — ASPIRIN 325 MG: 325 TABLET, COATED ORAL at 09:34

## 2021-02-24 RX ADMIN — OXYCODONE HYDROCHLORIDE 10 MG: 10 TABLET ORAL at 19:28

## 2021-02-24 RX ADMIN — ACETAMINOPHEN 650 MG: 325 TABLET ORAL at 22:36

## 2021-02-24 RX ADMIN — MAGNESIUM HYDROXIDE 30 ML: 2400 SUSPENSION ORAL at 19:28

## 2021-02-24 RX ADMIN — Medication 10 ML: at 09:34

## 2021-02-24 RX ADMIN — ACETAMINOPHEN 650 MG: 325 TABLET ORAL at 09:34

## 2021-02-24 RX ADMIN — OXYCODONE HYDROCHLORIDE 10 MG: 10 TABLET ORAL at 05:34

## 2021-02-24 RX ADMIN — Medication 10 ML: at 19:29

## 2021-02-24 RX ADMIN — ASPIRIN 325 MG: 325 TABLET, COATED ORAL at 21:46

## 2021-02-24 RX ADMIN — DOCUSATE SODIUM 50 MG AND SENNOSIDES 8.6 MG 1 TABLET: 8.6; 5 TABLET, FILM COATED ORAL at 21:46

## 2021-02-24 RX ADMIN — BUPROPION HYDROCHLORIDE 300 MG: 300 TABLET, FILM COATED, EXTENDED RELEASE ORAL at 09:34

## 2021-02-24 RX ADMIN — DOCUSATE SODIUM 50 MG AND SENNOSIDES 8.6 MG 1 TABLET: 8.6; 5 TABLET, FILM COATED ORAL at 09:34

## 2021-02-24 ASSESSMENT — PAIN DESCRIPTION - FREQUENCY: FREQUENCY: CONTINUOUS

## 2021-02-24 ASSESSMENT — PAIN SCALES - GENERAL
PAINLEVEL_OUTOF10: 9
PAINLEVEL_OUTOF10: 7
PAINLEVEL_OUTOF10: 0
PAINLEVEL_OUTOF10: 7

## 2021-02-24 ASSESSMENT — PAIN DESCRIPTION - PAIN TYPE
TYPE: SURGICAL PAIN
TYPE: SURGICAL PAIN

## 2021-02-24 ASSESSMENT — PAIN DESCRIPTION - ORIENTATION
ORIENTATION: LEFT
ORIENTATION: LEFT

## 2021-02-24 ASSESSMENT — PAIN DESCRIPTION - ONSET: ONSET: ON-GOING

## 2021-02-24 ASSESSMENT — PAIN DESCRIPTION - LOCATION
LOCATION: KNEE
LOCATION: KNEE

## 2021-02-24 ASSESSMENT — PAIN DESCRIPTION - DESCRIPTORS: DESCRIPTORS: ACHING

## 2021-02-24 ASSESSMENT — PAIN SCALES - WONG BAKER: WONGBAKER_NUMERICALRESPONSE: 0

## 2021-02-24 NOTE — PROGRESS NOTES
Per Dr Nagi Brown, patient cleared to admit to ARU today. Medicare is primary. Admit to ARU bed 5512b after negative COVID test. Orders in [facility]. [id number] notified.

## 2021-02-24 NOTE — CARE COORDINATION
2/24/2021 social work transition of care planning  Acute rehab stated that pt is appropriate,pendng bed availability and pt stability. Will need negative covid if going to acute rehab. I [facility] would like to see improved therapy,if going home. Sw will follow up with pt to discuss alternative discharge plan.   Electronically signed by TERESSA Oh on 2/24/2021 at 10:02 AM

## 2021-02-24 NOTE — CARE COORDINATION
2/24/2021 social work transition of care planning  Acute rehab can accept pt today. Covid test pending. Will need discharge readmit order, kj mendes notified ortho-they are in sx.   Electronically signed by TERESSA Castorena on 2/24/2021 at 3:45 PM

## 2021-02-24 NOTE — PROGRESS NOTES
[facility] Medicine    Subjective:  Pt alert conversive c/o postop pain      Current Facility-Administered Medications:     0.9 % sodium chloride infusion, , Intravenous, Continuous, Chris Riley DO, Last Rate: 100 mL/hr at 02/23/21 1451, New Bag at 02/23/21 1451    sodium chloride flush 0.9 % injection 10 mL, 10 mL, Intravenous, 2 times per day, Chris Riley DO, 10 mL at 02/22/21 2051    sodium chloride flush 0.9 % injection 10 mL, 10 mL, Intravenous, PRN, Chris Riley DO    acetaminophen (TYLENOL) tablet 650 mg, 650 mg, Oral, Q6H, Chris Riley DO, 650 mg at 02/23/21 1803    oxyCODONE (ROXICODONE) immediate release tablet 5 mg, 5 mg, Oral, Q4H PRN **OR** oxyCODONE HCl (OXY-IR) immediate release tablet 10 mg, 10 mg, Oral, Q4H PRN, Chris Riley DO, 10 mg at 02/24/21 0534    HYDROmorphone (DILAUDID) injection 0.25 mg, 0.25 mg, Intravenous, Q3H PRN **OR** HYDROmorphone (DILAUDID) injection 0.5 mg, 0.5 mg, Intravenous, Q3H PRN, Chris Riley DO, 0.5 mg at 02/23/21 1803    sennosides-docusate sodium (SENOKOT-S) 8.6-50 MG tablet 1 tablet, 1 tablet, Oral, BID, Chris Riley DO, 1 tablet at 02/23/21 2059    magnesium hydroxide (MILK OF MAGNESIA) 400 MG/5ML suspension 30 mL, 30 mL, Oral, Daily PRN, Chris Riley DO    ondansetron (ZOFRAN-ODT) disintegrating tablet 4 mg, 4 mg, Oral, Q8H PRN **OR** ondansetron (ZOFRAN) injection 4 mg, 4 mg, Intravenous, Q6H PRN, Chris Riley DO    aspirin EC tablet 325 mg, 325 mg, Oral, BID, Chris Riley DO, 325 mg at 02/23/21 2059    buPROPion (WELLBUTRIN XL) extended release tablet 300 mg, 300 mg, Oral, Madiha Crouch DO, 300 mg at 02/23/21 6777    simvastatin (ZOCOR) tablet 40 mg, 40 mg, Oral, Nightly, Kelly Crouch DO    Objective:    BP (!) 159/72   Pulse 105   Temp 99.9 °F (37.7 °C) (Temporal)   Resp 18   Ht 5' 5\" (1.651 m)   Wt 260 lb (117.9 kg)   SpO2 94%   BMI 43.27 kg/m²     Heart:  reg  Lungs:  ctab  Abd: + bs soft nontender  Extrem:  Edema legs    CBC with Differential:    Lab Results   Component Value Date    WBC 9.0 02/24/2021    RBC 3.55 02/24/2021    HGB 10.6 02/24/2021    HCT 32.6 02/24/2021     02/24/2021    MCV 91.8 02/24/2021    MCH 29.9 02/24/2021    MCHC 32.5 02/24/2021    RDW 13.0 02/24/2021    LYMPHOPCT 24.6 02/16/2021    MONOPCT 6.1 02/16/2021    BASOPCT 0.5 02/16/2021    MONOSABS 0.39 02/16/2021    LYMPHSABS 1.56 02/16/2021    EOSABS 0.15 02/16/2021    BASOSABS 0.03 02/16/2021     CMP:    Lab Results   Component Value Date     02/24/2021    K 4.0 02/24/2021    K 4.1 02/23/2021     02/24/2021    CO2 27 02/24/2021    BUN 9 02/24/2021    CREATININE 0.7 02/24/2021    GFRAA >60 02/24/2021    LABGLOM >60 02/24/2021    GLUCOSE 118 02/24/2021    PROT 7.3 02/16/2021    LABALBU 4.4 02/16/2021    CALCIUM 8.3 02/24/2021    BILITOT 1.0 02/16/2021    ALKPHOS 130 02/16/2021    AST 23 02/16/2021    ALT 20 02/16/2021     Warfarin PT/INR:    Lab Results   Component Value Date    INR 1.1 02/16/2021    PROTIME 12.5 (H) 02/16/2021       Assessment:    Principal Problem:    History of revision of total knee arthroplasty  Active Problems:    Hyperlipidemia    Anxiety    Class 3 severe obesity due to excess calories with serious comorbidity and body mass index (BMI) of 40.0 to 44.9 in [facility])  Resolved Problems:    * No resolved hospital problems.  *  postop anemia acute blood loss    Plan:  Dc planning pain control cont as per rina Enriquez  8:43 AM  2/24/2021

## 2021-02-24 NOTE — DISCHARGE INSTR - COC
Continuity of Care Form    Patient Name: Gloria Richard   :  1950  MRN:  [id number]    Admit date:  2021  Discharge date:  ***2021    Code Status Order: Full Code   Advance Directives:   Advance Care Flowsheet Documentation     Date/Time Healthcare Directive Type of Healthcare Directive Copy in [address] Agent's Name Healthcare Agent's Phone Number    21 0061  No, patient does not have an advance directive for healthcare treatment -- -- -- -- --    02/15/21 1458  No, patient does not have an advance directive for healthcare treatment -- -- -- -- --          Admitting Physician:  Teresita Borja MD  PCP: Blas Telles DO    Discharging Nurse: Alyson Garcia Unit/Room#: 8663/8658-Y  Discharging Unit Phone Number: [phone]    Emergency Contact:   Extended Emergency Contact Information  Primary Emergency Contact: Ramon Hawthorne  Address: [place] [facility], [address] [place] [address] Phone: [phone]  Mobile Phone: [phone]  Relation: Spouse  Secondary Emergency Contact: [address] [place] [address] Phone: [phone]  Relation: Brother/Sister    Past Surgical History:  Past Surgical History:   Procedure Laterality Date    BREAST SURGERY      cyst lt breast    CARDIAC CATHETERIZATION  2019    dr Olivia Rand  2012   Maxene Camper REPLACEMENT  2001    L knee    JOINT REPLACEMENT      rt knee    TOTAL KNEE ARTHROPLASTY Left 2021    LEFT KNEE TOTAL ARTHROPLASTY REVISION performed by Teresita Borja MD at [address] [place] [address]       Immunization History:   Immunization History   Administered Date(s) Administered    Influenza Vaccine, unspecified formulation 2010    Influenza Virus Vaccine 2010    Pneumococcal Polysaccharide ([id number]) 2019    Tetanus Toxoid, absorbed 2008       Active Problems:  Patient Active Problem List   Diagnosis Code    Abnormal nuclear stress test R94.39    Essential hypertension I10    Primary osteoarthritis of left knee M17.12    Hyperlipidemia E78.5    Fatty liver K76.0    Anxiety F41.9    Vitamin D deficiency E55.9    Class 3 severe obesity due to excess calories with serious comorbidity and body mass index (BMI) of 40.0 to 44.9 in adult (HCC) E66.01, Z68.41    EKG abnormalities R94.31    Carotid stenosis, left I65.22    Pre-diabetes R73.03    Varicose veins of both lower extremities with pain I83.813    Chronic right-sided low back pain without sciatica M54.5, G89.29    COVID-19 U07.1    History of revision of total knee arthroplasty Z96.659       Isolation/Infection:   Isolation          No Isolation        Patient Infection Status     Infection Onset Added Last Indicated Last Indicated By Review Planned Expiration Resolved Resolved By    None active    Resolved    COVID-19 Rule Out 21 COVID-19, Rapid (Ordered)   21 Rule-Out Test Resulted    COVID-19 Rule Out 21 COVID-19 Ambulatory (Ordered)   21 Rule-Out Test Resulted          Nurse Assessment:  Last Vital Signs: BP (!) 149/65   Pulse 101   Temp 99.5 °F (37.5 °C) (Temporal)   Resp 18   Ht 5' 5\" (1.651 m)   Wt 260 lb (117.9 kg)   SpO2 95%   BMI 43.27 kg/m²     Last documented pain score (0-10 scale): Pain Level: 7  Last Weight:   Wt Readings from Last 1 Encounters:   21 260 lb (117.9 kg)     Mental Status:  {IP PT MENTAL STATUS:}    IV Access:  {[facility] IV ACCESS:967705774}    Nursing Mobility/ADLs:  Walking   Assisted  Transfer  Assisted  Bathing  Assisted  Dressing  Assisted  Toileting  Assisted  Feeding  Independent  Med Admin  Assisted  Med Delivery   none    Wound Care Documentation and Therapy:        Elimination:  Continence:   · Bowel: {YES / U}  · Bladder: {YES / VD:61463}  Urinary Catheter: None Colostomy/Ileostomy/Ileal Conduit: {YES / GF:89877}       Date of Last BM: ***    Intake/Output Summary (Last 24 hours) at 2/24/2021 1649  Last data filed at 2/23/2021 1831  Gross per 24 hour   Intake --   Output 200 ml   Net -200 ml     I/O last 3 completed shifts:  In: -   Out: 1400 [Urine:1400]    Safety Concerns:     History of Falls (last 30 days)    Impairments/Disabilities:      None    Nutrition Therapy:  Current Nutrition Therapy:   - Oral Diet:  General    Routes of Feeding: {CHP DME Other Feedings:466304197}  Liquids: No Restrictions  Daily Fluid Restriction: no  Last Modified Barium Swallow with Video (Video Swallowing Test): not done    Treatments at the Time of Hospital Discharge:   Respiratory Treatments: ***  Oxygen Therapy:  is not on home oxygen therapy.   Ventilator:    - No ventilator support    Rehab Therapies: {THERAPEUTIC INTERVENTION:8476889390}  Weight Bearing Status/Restrictions: Touchdown weight bearing (10-25 lbs) only on left leg  Other Medical Equipment (for information only, NOT a DME order):  walker  Other Treatments: ***    Patient's personal belongings (please select all that are sent with patient):  Em    RN SIGNATURE:  {Esignature:385569318}    CASE MANAGEMENT/SOCIAL WORK SECTION    Inpatient Status Date: ***    Readmission Risk Assessment Score:  Readmission Risk              Risk of Unplanned Readmission:        9           Discharging to Facility/ Agency   · Name:   · Address:  · Phone:  · Fax:    Dialysis Facility (if applicable)   · Name:  · Address:  · Dialysis Schedule:  · Phone:  · Fax:    / signature: {Esignature:048870323}    PHYSICIAN SECTION    Prognosis: {Prognosis:5208945377}    Condition at Discharge: [facility] Patient Condition:451690108}    Rehab Potential (if transferring to Rehab): {Prognosis:9022358499}    Recommended Labs or Other Treatments After Discharge: ***    Physician Certification: I certify the above information and transfer of [place]  is necessary for the continuing treatment of the diagnosis listed and that she requires {Admit to Appropriate Level of Care:70415} for {GREATER/LESS:455616234} 30 days.      Update Admission H&P: {CHP DME Changes in TORLX:745215271}    PHYSICIAN SIGNATURE:  {Esignature:109711548}

## 2021-02-24 NOTE — PROGRESS NOTES
Physical Therapy  Facility/Department: [facility]  Daily Treatment Note  NAME: Evangelina Edwards  : 1950  MRN: [id number]    Date of Service: 2021  Referring Provider:  DO Yojana Davis PT:  Carmen Galo PT, DPT PT [id number]     Room #:  5719/9651-F  Diagnosis:  LEFT TOTAL KNEE ARTHROPLASTY ASEPTIC INFECTION. LEFT TOTAL KNEE ARTHROPLASTY LOOSENING OF COMPONENT     PMHx/PSHx: Anxiety, Chronic Back Pain, HLD, HTN, Liver Disease, Obesity, Tricuspid Regurgi     Procedure/Surgery:  Revision left total knee arthroplasty with explantation of previous femoral and tibial component and revision of both the femoral and entire tibial component 21  Precautions:  TTWB LLE, Knee Immobilizer Transition to Hinged Knee Brace (0-30), O2  Equipment Needs:  TBD     SUBJECTIVE:     Pt lives with spouse in a [facility] home with 3 stairs to enter with single rail. Pt ambulated with no device independently PTA. Equipment Owned: [facility] and [employer]     OBJECTIVE:    Initial Evaluation  Date: 21 Treatment Date: 21 Short Term/ Long Term   Goals   AM-PAC 6 Clicks 95/70  50/13     Was pt agreeable to Eval/treatment? Yes  yes     Does pt have pain? Moderate pain L Knee  Moderate pain/burning L Knee     Bed Mobility  Rolling: NT  Supine to sit: MaxA  Sit to supine: NT  Scooting: ModA Rolling: NT  Supine to sit: Evgeny  Sit to supine: NT  Scooting: Evgeny  Rolling: Independent     Supine to sit:  Independent     Sit to supine: Independent     Scooting: Independent      Transfers Sit to stand: ModA  Stand to sit: ModA  Stand pivot: ModA x 2 [employer]  Sit to stand: Evgeny  Stand to sit: Evgeny  Stand pivot: Evgeny [employer] Sit to stand: Modified Independent  Stand to sit: Modified Independent  Stand pivot: Modified Independent      Ambulation    NT- unable weakness 3', 10', 10' Evgeny [employer] (chair follow)  >25 feet with Modified Independent  [employer]   Stair negotiation: ascended and descended  NT   >4 steps with single rail Modified Independent  AAD   ROM BUE:  See OT Note  BLE:  WFL  KI to LLE       Strength BUE:  See OT Note  LLE: 2/5 hip  3/5 ankle  RLE: 4/5   Improve Strength 1/3 MMT Grade   Balance Sitting EOB:  SBA  Dynamic Standing:  ModA x 2 [facility]  Sitting EOB:  SBA  Dynamic Standing:  Evgeny [facility] Sitting EOB:  Independent     Dynamic Standing:  Modified Independent  AAD      Pt is A & O x 4  Sensation:  Pt denies numbness and tingling to extremities  Edema:  WNL        spo2 89-92% on RA  spo2 >93% 2 L O2        Therapeutic Exercises:  2x 5 chair push up    Patient education  Pt educated on role of PT    Patient response to education:   Pt verbalized understanding Pt demonstrated skill Pt requires further education in this area   x x x     ASSESSMENT:    Comments:  Pt received in supine agreeable to PT. Pt requiring assist of trunk for bed mobility. Verbal cues for positioning and sequencing using [facility]. Pt demonstrates ability to maintain TTWB this session, as shoe donned on RLE. Pt ambulated short distances, multiple bouts with cues and steadying assist. Pt transferred to [facility] to void. Pt educated on chair push up to perform as independent exercise program. Patient would benefit from continued skilled PT to maximize functional mobility independence. Daughter present for session    Treatment:  Patient practiced and was instructed in the following treatment:     Bed mobility- verbal cues to facilitate independence   Functional transfers-Verbal cues for proper positioning and sequencing to perform transfers safely with maximum independence.  Gait training-Verbal cues for proper positioning and sequencing using assistive device to maximize functional mobility independence.       PLAN:      PLAN OF CARE:    Current Treatment Recommendations     [x] Strengthening     [x] ROM   [x] Balance Training   [x] Endurance Training   [x] Transfer Training   [x] Gait Training   [x] Stair Training   [x] Positioning   [x] Safety and Education Training   [x] Patient/Caregiver Education   [x] HEP  [] Other       Patient is making good progress towards established goals. Will continue with current POC.       Time in  1110  Time out  1135    Total Treatment Time  25 minutes     CPT codes:  [] Gait training 42440 0 minutes  [] Manual therapy 04811 0 minutes  [x] Therapeutic activities 23053 25 minutes  [] Therapeutic exercises 74720 0 minutes  [] Neuromuscular reeducation 42705 0 minutes    Jessica Malcolm PT, DPT  [id number]

## 2021-02-24 NOTE — PROGRESS NOTES
Assessment:    Initial Eval Status  Date: 2- Treatment Status  Date:2/24/21 Short Term Goals = Long Term Goals  Treatment frequency: 3-5 days   Feeding Independent        Grooming Set up seated  setup for hair care seated Modified [place]    UB Dressing SBA seated EOB  SBA for gown change EOB  Modified [place]    LB Dressing Maximal Assist with socks  & L knee brace  max A  Modified [place]    Bathing Mod A with sim. Task seated  mod A Modified [place]    Toileting NT, chung  mod A to BSC unable to perform bladder hygiene Modified [place]    Bed Mobility  Supine to sit: Max A  Sit to supine: NT   supine to sit - min A for LLE able to scoot Supine to sit: Modified [place]   Sit to supine: Modified [place]    Functional Transfers Mod A sit <> stand with ww. Mod A x2 with SPT using ww. Pt. Maintained NWB on L LE  min A with ww and cues for sequencing and safety   Good ability to hold LLE off ground Modified [place]    Functional Mobility Mod A x2 with ww few shuffle steps towards bedside chair, pt. Maintained NWB on L LE  min A with ww in room to [facility] and able to hop short distance with good following of TTWB to LLE Modified [place]    Balance Sitting:     Static:  SUP    Dynamic:SBA  Standing: Mod A x2       Activity Tolerance Fair with lt. Ax.  spO2 & HR remained WFL on 1.5 L O2  fair with 2LO2 noted to desat when off O2 to 91% returned to 96% on 2L reinforced spirometer and deep breathing. HR 85 Good with lt/mod. Ax. Visual/  Perceptual Glasses: yes          Safety Awareness Fair/good  fair+ with ww and good with TTWB to LLE                    good       Comments: Upon arrival pt supine in bed . At end of session sitting up in chair and  all lines and tubes intact, call light within reach. · Pt has made good progress towards set goals.    · Continue with current plan of care    Treatment Time In: 11:00            Treatment Time Out: 11:36 Treatment Charges: Mins Units   Ther Ex  91406     Manual Therapy 94388     Thera Activities 19931 16 1   ADL/Home Mgt 47555 20 1   Neuro Re-ed 22472     Group Therapy      Orthotic manage/training  86295     Non-Billable Time     Total Timed Treatment 36 2     Stephanie Rain.  Yuliana [address], [facility] [address]

## 2021-02-24 NOTE — PROGRESS NOTES
Department of Orthopedic Surgery  Resident Progress Note  Patient seen and examined at bedside this morning. Overnight she felt like she had some swelling to the left knee which is partially resolved. She denies chest pain or difficulty with breathing. No nausea or vomiting. No numbness or tingling in the foot. No acute events overnight. VITALS:  BP (!) 159/72   Pulse 105   Temp 99.9 °F (37.7 °C) (Temporal)   Resp 18   Ht 5' 5\" (1.651 m)   Wt 260 lb (117.9 kg)   SpO2 94%   BMI 43.27 kg/m²     General: alert and oriented to person, place and time, well-developed and well-nourished, in no acute distress     MUSCULOSKELETAL:   left lower extremity:  · Knee immobilizer, ACE dressing in place, clean dry and intact  · Compartments soft and compressible  · +PF/DF/EHL  · +2/4 DP & PT pulses, Brisk Cap refill, Toes warm and perfused  · Distal sensation grossly intact to Peroneals, Sural, Saphenous, and tibial nrs    CBC:   Lab Results   Component Value Date    WBC 11.8 02/23/2021    HGB 11.1 02/23/2021    HCT 34.4 02/23/2021     02/23/2021     PT/INR:    Lab Results   Component Value Date    PROTIME 12.5 02/16/2021    INR 1.1 02/16/2021       Intraop Cultures: pending    ASSESSMENT  · S/P revision left total knee arthroplasty    PLAN      · Continue physical therapy and protocol:  Toe-touch weightbearing- LLE, knee immobilizer, hinged knee brace ordered  · Deep venous thrombosis prophylaxis - aspirin 325 twice daily, early mobilization  · PT/OT  · Pain Control: IV and PO  · Monitor H&H 11.1  · D/C Plan: PT/OT/SW, patient will likely need rehab as she recovers

## 2021-02-24 NOTE — PROGRESS NOTES
Acute Rehab Pre-Admission Screen      Referral date: 2/23/21  Onset/Hospital Admit Date: 2/22/2021  6:50 AM    Current Location: [address] [place] [address]    Name: Carlyle Frederick  YOB: 1950  Age: 65 y.o. Admitting Diagnosis: TKA   Address: [place] [address]    Home Phone: [phone] (home)  Jessica Echeverria [facility] #:     Sex: female  Race:   Marital Status:    Ethnic/Cultural/Druze Considerations: Judaism    Advanced Directives: [x] Full Code  [] [address] [] Medications only       [] Living Will  [] DPOA      []Organ donor      [] No mechanical breathing or ventilation     [] no tube feeding, nutrition or hydration      [x] Patient does not have advanced directives or living will         COVERAGE INFORMATION   Primary Insurer: medicare Medicare #: [id number]    Verified coverage: [] Patient  [] Family/caregiver    [] financial department [] insurance carrier    COVID SCREEN DATE: 2/24/21 Result: negative      MEDICAL UPDATE:  History of present admission: 65year old female admitted 2/22/21 for an elective left TKA.   2/23- POD 1. On 2L NC. Left knee pain controlled. 2/24- performed better with therapy today. Attempted ambulation.      PHYSICIAN / REFERRAL INFORMATION  Referring Physician: Belkys Hylton  Attending Physician: Bjorn Mcfadden, *  Primary Care Physician: Claudine Butt DO  Consultants/Opinions (see full consult notes on chart): internal med- medical management    SOCIAL INFORMATION  Primary  Contact: Vidya Jovel  Relationship: spouse  Primary Phone: [phone]  Secondary  Contact: Carie Swanson  Relationship: sister  Primary Phone: [phone]    Previous Community Services: none  Caregiver available: [x] Yes [] No Hours per day available: tbd  Patient previously employed:  [] Yes [] Part Time [] Full Time [x] No [x] Retired  Occupation/Profession: retired  Prior living arrangements: [x] Home  [] Assisted living  [] SNF [] Other  Lived with: [] Alone  [x] Spouse  [] Family  [] Other  Lived with: spouse  Contact phone: see above  Home:  1.5 story home  3 entry steps  Rails: 1  Steps:  few to 2nd floor  Rails:  0   Bedroom: [x] 1st floor  [] 2nd floor    Bathroom:  [x] 1st floor  [] 2nd floor    Prior Functional Level: Independent for: adls, iadls no AD, drove  Assistance for:   Dependent for:   Dominant hand: [x] Right  [] Left    Previous Home Equipment:  [x] Cane [] Grab bars [] Orthotic / prosthetic   [] Shower chair [] Tub bench  [] 3-in-1 Commode [] Long handle sponge   [] Oxygen [] Sock aide  [] Wheelchair  [] motorized wc/scooter  [] Wheelchair cushion   [] Crutches [] Long handle shoehorn  [] Reachers [] Toilet seat elevator [] Rollator  [x] Walker(wheeled)   [] Walker(standard) [] Mechanical lift    [] None of the above     Has patient had 2 or more falls in the past year or any fall with injury in the past year? [] yes   [x] no   []unknown    Has patient had major surgery during past 100 days prior to admission?    [x] yes   [] no Type/ Date: 2-22-21:  Revision left total knee arthroplasty with explantation of previous femoral and tibial component and revision of both the femoral and entire tibial component    Surgical History:  Past Surgical History:   Procedure Laterality Date    BREAST SURGERY  1990    cyst lt breast    CARDIAC CATHETERIZATION  06/19/2019    dr Yakelin Bateman  5/2012    HYSTERECTOMY  1980    JOINT REPLACEMENT  2001    L knee    JOINT REPLACEMENT  2001    rt knee    TOTAL KNEE ARTHROPLASTY Left 2/22/2021    LEFT KNEE TOTAL ARTHROPLASTY REVISION performed by Letha Hawkins MD at [address] [place] [address]       Past Medical:  Past Medical History:   Diagnosis Date    Anxiety     Basal cell carcinoma (BCC) of right shoulder     Chronic back pain     Closed avulsion fracture of distal fibula with delayed healing, left     Degenerative joint disease involving multiple joints     Diverticulitis Complications: Falls, injury, pain, skin breakdown, abnormal vitals, abnormal labs, DVT, PE, pneumonia, decreased mobility, neuro changes     CLINICAL DATA:     Height : 5'5     Weight:  260 lbs   BMI: 43.27       Date: 2/24/21 Date:  Date:    temperature 99.9     pulse 105     respirations 18     Blood pressure 159/72     Pulse oximeter         ALLERGIES: Patient has no known allergies. DIET : DIET GENERAL;    Current Lab and Diagnostic Tests:   Recent Results (from the past 24 hour(s))   CBC    Collection Time: 02/24/21  6:59 AM   Result Value Ref Range    WBC 9.0 4.5 - 11.5 E9/L    RBC 3.55 3.50 - 5.50 E12/L    Hemoglobin 10.6 (L) 11.5 - 15.5 g/dL    Hematocrit 32.6 (L) 34.0 - 48.0 %    MCV 91.8 80.0 - 99.9 fL    MCH 29.9 26.0 - 35.0 pg    MCHC 32.5 32.0 - 34.5 %    RDW 13.0 11.5 - 15.0 fL    Platelets 502 333 - 181 E9/L    MPV 11.2 7.0 - 12.0 fL   Basic Metabolic Panel    Collection Time: 02/24/21  6:59 AM   Result Value Ref Range    Sodium 135 132 - 146 mmol/L    Potassium 4.0 3.5 - 5.0 mmol/L    Chloride 103 98 - 107 mmol/L    CO2 27 22 - 29 mmol/L    Anion Gap 5 (L) 7 - 16 mmol/L    Glucose 118 (H) 74 - 99 mg/dL    BUN 9 8 - 23 mg/dL    CREATININE 0.7 0.5 - 1.0 mg/dL    GFR Non-African American >60 >=60 mL/min/1.73    GFR African American >60     Calcium 8.3 (L) 8.6 - 10.2 mg/dL     No results found.     Additional labs or diagnostic studies needed before admission to rehabilitation unit:  none    Medications:   sodium chloride flush  10 mL Intravenous 2 times per day    acetaminophen  650 mg Oral Q6H    sennosides-docusate sodium  1 tablet Oral BID    aspirin  325 mg Oral BID    buPROPion  300 mg Oral QAM    simvastatin  40 mg Oral Nightly      sodium chloride 100 mL/hr at 02/23/21 1451     sodium chloride flush, oxyCODONE **OR** oxyCODONE, HYDROmorphone **OR** HYDROmorphone, magnesium hydroxide, ondansetron **OR** ondansetron    SPECIAL PRECAUTIONS: [x] No current precautions  [] Cardiac  [] Dressing Upper Extremity OT Stand by Assist Stand by Assist    Dressing Lower Extremity OT Max Assist Max Assist    Toileting OT chung Moderate Assist    Toilet Transfers OT nt Minimum assistance    Tub/Shower Transfers OT nt nt    Homemaking OT nt nt    Bed Mobility PT Max Assist Minimum assistance    Bed/Wheelchair Transfers PT Moderate Assist  x2 Minimum assistance    Locomotion Walk / Wheelchair  Device:  Distance: PT nt 33 ft [employer] Minimum assistance    Endurance PT fair fair    Expression SP  nt    Social Interaction SP  nt    Problem Solving SP  nt    Memory SP  nt    Comprehension SP  nt    Swallowing SP  nt    Bowel Management NSG  None charted    Bladder Management NSG chung continent      Comments on Functional Status: Able to tolerate 3 hours of therapy a day    [x] Able to participate a minimum of 3 hours per day of therapy intervention    Required treatments/services: [x] Rehabilitation nursing [] Dietitian / nurtition                 [x] Case management  [] Respiratory Therapy      [x] Social work   [] Other     Required Therapy:  Therapy Hours per Day Days per Week Therapeutic Interventions Required   [x] Physical Therapy 1.5 5-7 Gait, transfers, Safety, strength, education, endurance   [x] Occupational Therapy 1.5 5-7 ADLS, IADLs, Safety, strength, education, endurance   [] Speech Pathology      [] Prosthetics / Orthotics       []         Anticipated Discharge Plan:   Anticipated DME Needs:  [x] Home     [] Commode   [] Alone    [] Wheelchair   [x] Supervised    [] Fleurette Amble   [] Assist    [] Oxygen        [] Hospital Bed  [] Assisted Living    [] Ramp        [x] To Be Determined    Anticipated Home Health Services:  Anticipated Outpatient Services:  [] PT       [] PT  [] OT      [] OT  [] Speech     [] Speech  [] Nursing     [] Dialysis  [] Aide      [x] To Be Determined  [x] To Be Determined    Anticipated support group:  [] Amputation  [] Multiple Sclerosis  [] Stroke  [] Brain Injury  [] Spinal cord accepted for IRF admission, recommended level of care:  [] [address]  [] [address]  [] [place]   [] Home Care  [] Other      [] LTAC       Physician Assigned:  [] Dr. Beryle Micaela         [] DrMahi Art              [x] DrMahi Rosas Shape [] DrMahi Cantuz  [] Dr. Lucas Percy:    ____________________________________________________________________  ____________________________________________________________________  ____________________________________________________________________  ____________________________________________________________________  ____________________________________________________________________      Physician Signature:_____________________________________    Print Signature:_________________________________________    Date:   2/24/21   Time:    1500

## 2021-02-24 NOTE — LETTER
PORTABLE PATIENT PROFILE  Eliana Wade  [id number]    MEDICAL DIAGNOSIS/CONDITION:   Patient Active Problem List   Diagnosis    Abnormal nuclear stress test    Essential hypertension    Primary osteoarthritis of left knee    Hyperlipidemia    Fatty liver    Anxiety    Vitamin D deficiency    Class 3 severe obesity due to excess calories with serious comorbidity and body mass index (BMI) of 40.0 to 44.9 in adult [facility])    EKG abnormalities    Carotid stenosis, left    Pre-diabetes    Varicose veins of both lower extremities with pain    Chronic right-sided low back pain without sciatica    COVID-19    History of revision of total knee arthroplasty       INSURANCE INFORMATION:  Payor: MEDICARE /  /  /     ADVANCED DIRECTIVES:   Advance Directives (For Healthcare)  Pre-existing DNR Comfort Care/DNR Arrest/DNI Order: No  Healthcare Directive: No, patient does not have an advance directive for healthcare treatment  Information on Healthcare Directives Requested: No  Advance Directives: Pt. not interested at this time  [unfilled]     EMERGENCY CONTACT:       RISK FACTORS:   Social History     Tobacco Use    Smoking status: Never Smoker    Smokeless tobacco: Never Used   Substance Use Topics    Alcohol use: Yes     Alcohol/week: 1.0 standard drinks     Types: 1 Glasses of wine per week     Comment: socially       ALLERGIES:  No Known Allergies    IMMUNIZATIONS:  Immunization History   Administered Date(s) Administered    Influenza Vaccine, unspecified formulation 11/23/2010    Influenza Virus Vaccine 11/23/2010    Pneumococcal Polysaccharide ([id number]) 11/06/2019    Tetanus Toxoid, absorbed 03/23/2008       SWALLOWING:   Difficulty Chewing or Swallowing Food: No    VISION AND HEARING:   Sensory Problems  Visual impairment: Glasses    PHYSICIANS INVOLVED WITH CARE:    Tracy Colindres MD  No ref.  provider found  DO Tracy Ramirez MD

## 2021-02-25 LAB
ALBUMIN SERPL-MCNC: 3.3 G/DL (ref 3.5–5.2)
ALP BLD-CCNC: 122 U/L (ref 35–104)
ALT SERPL-CCNC: 27 U/L (ref 0–32)
ANION GAP SERPL CALCULATED.3IONS-SCNC: 7 MMOL/L (ref 7–16)
AST SERPL-CCNC: 38 U/L (ref 0–31)
BASOPHILS ABSOLUTE: 0.04 E9/L (ref 0–0.2)
BASOPHILS RELATIVE PERCENT: 0.5 % (ref 0–2)
BILIRUB SERPL-MCNC: 1.1 MG/DL (ref 0–1.2)
BUN BLDV-MCNC: 6 MG/DL (ref 8–23)
CALCIUM SERPL-MCNC: 9.2 MG/DL (ref 8.6–10.2)
CHLORIDE BLD-SCNC: 104 MMOL/L (ref 98–107)
CO2: 29 MMOL/L (ref 22–29)
CREAT SERPL-MCNC: 0.7 MG/DL (ref 0.5–1)
EOSINOPHILS ABSOLUTE: 0.21 E9/L (ref 0.05–0.5)
EOSINOPHILS RELATIVE PERCENT: 2.6 % (ref 0–6)
GFR AFRICAN AMERICAN: >60
GFR NON-AFRICAN AMERICAN: >60 ML/MIN/1.73
GLUCOSE BLD-MCNC: 101 MG/DL (ref 74–99)
HCT VFR BLD CALC: 32.4 % (ref 34–48)
HEMOGLOBIN: 11.1 G/DL (ref 11.5–15.5)
IMMATURE GRANULOCYTES #: 0.05 E9/L
IMMATURE GRANULOCYTES %: 0.6 % (ref 0–5)
INR BLD: 1.3
LYMPHOCYTES ABSOLUTE: 1.73 E9/L (ref 1.5–4)
LYMPHOCYTES RELATIVE PERCENT: 21.6 % (ref 20–42)
MAGNESIUM: 2.2 MG/DL (ref 1.6–2.6)
MCH RBC QN AUTO: 30.6 PG (ref 26–35)
MCHC RBC AUTO-ENTMCNC: 34.3 % (ref 32–34.5)
MCV RBC AUTO: 89.3 FL (ref 80–99.9)
MONOCYTES ABSOLUTE: 0.52 E9/L (ref 0.1–0.95)
MONOCYTES RELATIVE PERCENT: 6.5 % (ref 2–12)
NEUTROPHILS ABSOLUTE: 5.47 E9/L (ref 1.8–7.3)
NEUTROPHILS RELATIVE PERCENT: 68.2 % (ref 43–80)
PDW BLD-RTO: 12.9 FL (ref 11.5–15)
PLATELET # BLD: 207 E9/L (ref 130–450)
PMV BLD AUTO: 10.9 FL (ref 7–12)
POTASSIUM REFLEX MAGNESIUM: 3.5 MMOL/L (ref 3.5–5)
PROTHROMBIN TIME: 14.9 SEC (ref 9.3–12.4)
RBC # BLD: 3.63 E12/L (ref 3.5–5.5)
SODIUM BLD-SCNC: 140 MMOL/L (ref 132–146)
TOTAL PROTEIN: 6 G/DL (ref 6.4–8.3)
WBC # BLD: 8 E9/L (ref 4.5–11.5)

## 2021-02-25 PROCEDURE — 97161 PT EVAL LOW COMPLEX 20 MIN: CPT

## 2021-02-25 PROCEDURE — 83735 ASSAY OF MAGNESIUM: CPT

## 2021-02-25 PROCEDURE — 6370000000 HC RX 637 (ALT 250 FOR IP): Performed by: STUDENT IN AN ORGANIZED HEALTH CARE EDUCATION/TRAINING PROGRAM

## 2021-02-25 PROCEDURE — 97166 OT EVAL MOD COMPLEX 45 MIN: CPT

## 2021-02-25 PROCEDURE — 97110 THERAPEUTIC EXERCISES: CPT

## 2021-02-25 PROCEDURE — 1280000000 HC REHAB R&B

## 2021-02-25 PROCEDURE — 97530 THERAPEUTIC ACTIVITIES: CPT

## 2021-02-25 PROCEDURE — 6370000000 HC RX 637 (ALT 250 FOR IP): Performed by: PHYSICAL MEDICINE & REHABILITATION

## 2021-02-25 PROCEDURE — 2580000003 HC RX 258: Performed by: STUDENT IN AN ORGANIZED HEALTH CARE EDUCATION/TRAINING PROGRAM

## 2021-02-25 PROCEDURE — 85610 PROTHROMBIN TIME: CPT

## 2021-02-25 PROCEDURE — 85025 COMPLETE CBC W/AUTO DIFF WBC: CPT

## 2021-02-25 PROCEDURE — 36415 COLL VENOUS BLD VENIPUNCTURE: CPT

## 2021-02-25 PROCEDURE — 80053 COMPREHEN METABOLIC PANEL: CPT

## 2021-02-25 PROCEDURE — 97535 SELF CARE MNGMENT TRAINING: CPT

## 2021-02-25 RX ORDER — LACTULOSE 10 G/15ML
20 SOLUTION ORAL DAILY
Status: DISCONTINUED | OUTPATIENT
Start: 2021-02-25 | End: 2021-02-26

## 2021-02-25 RX ORDER — SIMVASTATIN 40 MG
40 TABLET ORAL NIGHTLY
Status: DISCONTINUED | OUTPATIENT
Start: 2021-02-25 | End: 2021-03-09 | Stop reason: HOSPADM

## 2021-02-25 RX ADMIN — Medication 10 ML: at 08:30

## 2021-02-25 RX ADMIN — OXYCODONE HYDROCHLORIDE 10 MG: 10 TABLET ORAL at 21:52

## 2021-02-25 RX ADMIN — ACETAMINOPHEN 650 MG: 325 TABLET ORAL at 04:52

## 2021-02-25 RX ADMIN — ASPIRIN 325 MG: 325 TABLET, COATED ORAL at 08:31

## 2021-02-25 RX ADMIN — ACETAMINOPHEN 650 MG: 325 TABLET ORAL at 17:12

## 2021-02-25 RX ADMIN — OXYCODONE HYDROCHLORIDE 10 MG: 10 TABLET ORAL at 03:29

## 2021-02-25 RX ADMIN — DOCUSATE SODIUM 50 MG AND SENNOSIDES 8.6 MG 1 TABLET: 8.6; 5 TABLET, FILM COATED ORAL at 08:30

## 2021-02-25 RX ADMIN — LACTULOSE 20 G: 20 SOLUTION ORAL at 11:35

## 2021-02-25 RX ADMIN — ACETAMINOPHEN 650 MG: 325 TABLET ORAL at 21:59

## 2021-02-25 RX ADMIN — OXYCODONE HYDROCHLORIDE 10 MG: 10 TABLET ORAL at 08:33

## 2021-02-25 RX ADMIN — DOCUSATE SODIUM 50 MG AND SENNOSIDES 8.6 MG 1 TABLET: 8.6; 5 TABLET, FILM COATED ORAL at 20:41

## 2021-02-25 RX ADMIN — OXYCODONE HYDROCHLORIDE 10 MG: 10 TABLET ORAL at 14:15

## 2021-02-25 RX ADMIN — ACETAMINOPHEN 650 MG: 325 TABLET ORAL at 11:35

## 2021-02-25 RX ADMIN — ASPIRIN 325 MG: 325 TABLET, COATED ORAL at 20:41

## 2021-02-25 RX ADMIN — BUPROPION HYDROCHLORIDE 300 MG: 300 TABLET, FILM COATED, EXTENDED RELEASE ORAL at 08:31

## 2021-02-25 ASSESSMENT — PAIN DESCRIPTION - ONSET
ONSET: ON-GOING

## 2021-02-25 ASSESSMENT — PAIN DESCRIPTION - LOCATION
LOCATION: KNEE

## 2021-02-25 ASSESSMENT — PAIN SCALES - GENERAL
PAINLEVEL_OUTOF10: 7
PAINLEVEL_OUTOF10: 6
PAINLEVEL_OUTOF10: 7
PAINLEVEL_OUTOF10: 6
PAINLEVEL_OUTOF10: 4
PAINLEVEL_OUTOF10: 5
PAINLEVEL_OUTOF10: 0
PAINLEVEL_OUTOF10: 5
PAINLEVEL_OUTOF10: 8

## 2021-02-25 ASSESSMENT — PAIN DESCRIPTION - FREQUENCY
FREQUENCY: CONTINUOUS

## 2021-02-25 ASSESSMENT — PAIN DESCRIPTION - PROGRESSION
CLINICAL_PROGRESSION: NOT CHANGED

## 2021-02-25 ASSESSMENT — PAIN DESCRIPTION - DESCRIPTORS
DESCRIPTORS: ACHING;DISCOMFORT;SORE
DESCRIPTORS: ACHING;DISCOMFORT

## 2021-02-25 ASSESSMENT — PAIN DESCRIPTION - ORIENTATION: ORIENTATION: LEFT

## 2021-02-25 ASSESSMENT — PAIN - FUNCTIONAL ASSESSMENT
PAIN_FUNCTIONAL_ASSESSMENT: PREVENTS OR INTERFERES SOME ACTIVE ACTIVITIES AND ADLS

## 2021-02-25 ASSESSMENT — PAIN DESCRIPTION - PAIN TYPE: TYPE: ACUTE PAIN;SURGICAL PAIN

## 2021-02-25 NOTE — PROGRESS NOTES
Late Entry:  Patient given a signed copy of The Important Message From Medicare.  Stacie Collins  2/25/2021  10:26 AM

## 2021-02-25 NOTE — CARE COORDINATION
Social Work Assessment Summary    PCP: Dr. Adriana Gonzalez    Patient Resides: with her spouse- Ada Barragan. Home Architecture : They live in a [facility] with (3) steps at side entrance (1) rail. Pt. Has access to tub only on 1st floor. Walk in shower in basement with (12) steps down (2) rails. Will you return to your own home? Yes        Primary Caregiver: Ada Barragan (68) is retired, healthy and drives. He gets anxious very easy. They have (3) children; Lawrence Lancaster (50) works FT at [employer]. Ada Barragan (52) and Chaim Shield (39). Level of Function PTA : Pt. Was independent in all areas. Employment: Retired from [employer] 2015. DME Pta  : Straight cane, [employer], Elevated toilet seat, ETB    Community Agency Involvement PTA : None    Do they have a medical profile: No    Family Teaching: to be scheduled with Iva Bowden.     Strength: \"to get moving and get home\"    Preference: \"Motivated\"      NAME RELATION HOME # WORK # CELL #   Annalee Brock [facility]   [phone]   Lawrence Lancaster [facility]   [phone]            Height: 5'5  [address]  2/25/2021

## 2021-02-25 NOTE — DISCHARGE INSTR - COC
Continuity of Care Form    Patient Name: Prieto Johnston   :  1950  MRN:  [id number]    Admit date:  2021  Discharge date:  ***    Code Status Order: Full Code   Advance Directives:   Advance Care Flowsheet Documentation     Date/Time Healthcare Directive Type of Healthcare Directive Copy in [address] Agent's Name Healthcare Agent's Phone Number    21 [phone]  No, patient does not have an advance directive for healthcare treatment -- -- -- -- --          Admitting Physician:  Gala Wright MD  PCP: Kim Scherer DO    Discharging Nurse: [facility] Unit/Room#: 2260/3067-M  Discharging Unit Phone Number: ***    Emergency Contact:   Extended Emergency Contact Information  Primary Emergency Contact: Shayy Tello  Address: [address] [place] [address] Phone: [phone]  Mobile Phone: [phone]  Relation: Spouse  Secondary Emergency Contact: [address] [place] [address] Phone: [phone]  Relation: Brother/Sister    Past Surgical History:  Past Surgical History:   Procedure Laterality Date    BREAST SURGERY      cyst lt breast    CARDIAC CATHETERIZATION  2019    dr Dary Dowell  2012   Eulah Book REPLACEMENT      L knee    JOINT REPLACEMENT      rt knee    TOTAL KNEE ARTHROPLASTY Left 2021    LEFT KNEE TOTAL ARTHROPLASTY REVISION performed by Lorin Epps MD at [address] [place] [address]       Immunization History:   Immunization History   Administered Date(s) Administered    Influenza Vaccine, unspecified formulation 2010    Influenza Virus Vaccine 2010    Pneumococcal Polysaccharide ([id number]) 2019    Tetanus Toxoid, absorbed 2008       Active Problems:  Patient Active Problem List   Diagnosis Code    Abnormal nuclear stress test R94.39    Essential hypertension I10    Primary Catheter:710347881}   Colostomy/Ileostomy/Ileal Conduit: {YES / MA:01386}       Date of Last BM: ***    Intake/Output Summary (Last 24 hours) at 2021 1119  Last data filed at 2021 1055  Gross per 24 hour   Intake 430 ml   Output --   Net 430 ml     I/O last 3 completed shifts:   In: 10 [I.V.:10]  Out: -     Safety Concerns:     [facility] Safety Concerns:915733678}    Impairments/Disabilities:      [facility] Impairments/Disabilities:906688039}    Nutrition Therapy:  Current Nutrition Therapy:   [facility] Diet List:654035841}    Routes of Feeding: {CHP DME Other Feedings:720109042}  Liquids: {Slp liquid thickness:27119}  Daily Fluid Restriction: {CHP DME Yes amt example:248162222}  Last Modified Barium Swallow with Video (Video Swallowing Test): {Done Not Done GHDJ:493188497}    Treatments at the Time of Hospital Discharge:   Respiratory Treatments: ***  Oxygen Therapy:  {Therapy; copd oxygen:01447}  Ventilator:    { CC Vent KOJF:977817625}    Rehab Therapies: {THERAPEUTIC INTERVENTION:2371876698}  Weight Bearing Status/Restrictions: [address] [place] [address] Weight Bearin}  Other Medical Equipment (for information only, NOT a DME order):  {EQUIPMENT:197984564}  Other Treatments: ***    Patient's personal belongings (please select all that are sent with patient):  {[facility] DME Belongings:220839970}    RN SIGNATURE:  {Esignature:456740259}    CASE MANAGEMENT/SOCIAL WORK SECTION    Inpatient Status Date: ***    Readmission Risk Assessment Score:  Readmission Risk              Risk of Unplanned Readmission:        9           Discharging to Facility/ Agency   · Name:   · Address:  · Phone:  · Fax:    Dialysis Facility (if applicable)   · Name:  · Address:  · Dialysis Schedule:  · Phone:  · Fax:    / signature: {Esignature:176040972}    PHYSICIAN SECTION    Prognosis: {Prognosis:1672799498}    Condition at Discharge: [address] [facility] Patient Condition:614056603}    Rehab Potential (if transferring to Rehab): {Prognosis:6682928228}    Recommended Labs or Other Treatments After Discharge: ***    Physician Certification: I certify the above information and transfer of Xiao Bello  is necessary for the continuing treatment of the diagnosis listed and that she requires {Admit to Appropriate Level of Care:30531} for {GREATER/LESS:512904309} 30 days.      Update Admission H&P: {CHP DME Changes in ZDMUL:155470624}    PHYSICIAN SIGNATURE:  {Esignature:609047241}

## 2021-02-25 NOTE — PROGRESS NOTES
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 Pt required assist to utilize AE. Limited by pain. Difficulty donning slip on shoes d/t edema, requested pt have tennis shoes brought in d/t edema to increase independent. Pt agreeable. Toileting 21 Mod A      Homemaking  TBA       Functional Transfers / Balance:   Date Status DME  Comments   Sit Balance 21 Supervision      Stand Balance 21 Min A     [] Tub  [] Shower   Transfer  TBA     Commode   Transfer 21 Mod A     Functional   Mobility  TBA     Other:         Functional Exercises / Activity:   -2# dumbbell exercises completed 3x10 reps with R and L arm with focus on increasing B UE strength and overall endurance for functional activities. Fair tolerance. Sensory / Neuromuscular Re-Education:      Cognitive Skills:   Status Comments   Problem   Solving good     Memory good     Sequencing fair +    Safety fair       Visual Perception:    Education:  -Pt educated on role of OT and LB Dressing techniques. Pt verbalized/demonstrated fair understanding, recommend continued education. [] Family teach completed on:    Pain Level: 6/10 L LE. Additional Notes:       Patient has made good  progress during treatment sessions toward set goals.  Therapy emphasis to obtain goals:   Current Treatment Recommendations: Strengthening, Endurance Training, Patient/Caregiver Education & Training, Equipment Evaluation, Education, & procurement,

## 2021-02-25 NOTE — H&P
Post Admission Physician Evaluation      Patient Identification  Vazquez Butler is a 65 y.o. female. :  1950  Admit Date:  2021  Attending Provider: Jeffry Verma MD                                  Primary Care Physician:  Gerard Nevarez DO   Consultants:  [facility] - ortho  Admitting Diagnosis: History of revision of total knee arthroplasty [Z96.659]    Indication for Rehabilitation Admission:  Orthopedic Disorders:    Unilateral Knee Replacement    The main medical problem(s) being actively managed by the physicians and requiring 24 hour rehabilitation nursing care during this stay include pain control, dvt prophylaxis, education, close neuro follow up in post op setting. This patient has rehabilitation potential for functional improvement. The domains of functional impairment present in this patient which will require an intensive and interdisciplinary rehabilitation environment include self care, mobility, motor dysfunction, pain management and safety. History of present illness: The patient is a 65 y.o. female with significant past medical history as documented below. She has a history of previous knee replacements. The left knee replacement was done in . She has had progressive increasing pain in the left knee. Work-up did show some loosening of the prosthesis. She had no sign of infection. She has had mildly elevated sed rate and CRP but these have been stable. On 2021 she underwent a revision of the left knee arthroplasty with explantation of the previous femoral and tibial components by Dr. Pino Odonnell. She suffered fragmentation of the proximal tibial bone due to the brittle nature of the bone and distraction from the previous tibial component. There was nothing during the procedure to suggest an infectious process. She did well postoperatively.   She has remained quite limited from a functional standpoint due to her weightbearing precautions. She now presents to the rehab unit at [facility] to increase her functional level prior to returning home with the assist of her family. She denies any numbness or tingling. Her pain is controlled. She did admit to constipation and her bowel meds are in place. Past Medical History:   Past Medical History:   Diagnosis Date    Anxiety     Basal cell carcinoma (BCC) of right shoulder     Chronic back pain     Closed avulsion fracture of distal fibula with delayed healing, left     Degenerative joint disease involving multiple joints     Diverticulitis     Hyperlipidemia     Hypertension     Essential hypertension    Liver disease     LLL pneumonia     Obesity     Osteoarthritis     Palpitations 9/17/2012    Sleep apnea     Tricuspid regurgitation     Trochanteric bursitis of right hip     Varicose veins of both lower extremities       Past Surgical History:   Procedure Laterality Date    BREAST SURGERY  1990    cyst lt breast    CARDIAC CATHETERIZATION  06/19/2019    dr Nikolas Colin  5/2012    Sol Reusing REPLACEMENT  2001    L knee    JOINT REPLACEMENT  2001    rt knee    TOTAL KNEE ARTHROPLASTY Left 2/22/2021    LEFT KNEE TOTAL ARTHROPLASTY REVISION performed by Chelsea Bean MD at [facility] OR       Allergies: Patient has no known allergies. Medications:   Prior to Admission medications    Medication Sig Start Date End Date Taking?  Authorizing Provider   Multiple Vitamins-Minerals (CENTRUM SILVER 50+WOMEN PO) Take by mouth   Yes Historical Provider, MD   buPROPion (WELLBUTRIN XL) 300 MG extended release tablet Take 1 tablet by mouth every morning 9/18/20  Yes Manan Gallegos DO   simvastatin (ZOCOR) 40 MG tablet Take 1 tablet by mouth nightly 9/18/20  Yes Manan Gallegos,    vitamin D (CHOLECALCIFEROL) 5000 UNITS CAPS capsule Take 5,000 Units by mouth daily   Yes Historical Provider, MD   vitamin E 400 UNIT capsule Take 400 Units by mouth daily. Yes Historical Provider, MD   Coenzyme Q10 (CO Q 10 PO) Take 1 tablet by mouth daily. Yes Historical Provider, MD   oxyCODONE (ROXICODONE) 5 MG immediate release tablet Take 1 tablet by mouth every 6 hours as needed for Pain for up to 7 days. Intended supply: 7 days.  Take lowest dose possible to manage pain 2/22/21 3/1/21  Sarahy Rowland DO   methocarbamol (ROBAXIN-750) 750 MG tablet Take 1 tablet by mouth 4 times daily for 10 days 2/22/21 3/4/21  Sarahy Rowland DO   aspirin 325 MG EC tablet Take 1 tablet by mouth 2 times daily (with meals) 2/22/21 3/24/21  Sarahy Rowland DO      Current Facility-Administered Medications:     acetaminophen (TYLENOL) tablet 650 mg, 650 mg, Oral, Q6H, Ziggy Loera DO, 650 mg at 02/25/21 5640    aspirin EC tablet 325 mg, 325 mg, Oral, BID, Ziggy Loera DO, 325 mg at 02/25/21 0831    magnesium hydroxide (MILK OF MAGNESIA) 400 MG/5ML suspension 30 mL, 30 mL, Oral, Daily PRN, Ziggy Loera DO, 30 mL at 02/24/21 1928    ondansetron (ZOFRAN-ODT) disintegrating tablet 4 mg, 4 mg, Oral, Q8H PRN **OR** [DISCONTINUED] ondansetron (ZOFRAN) injection 4 mg, 4 mg, Intravenous, Q6H PRN, Ziggy Loera DO    oxyCODONE (ROXICODONE) immediate release tablet 5 mg, 5 mg, Oral, Q4H PRN **OR** oxyCODONE HCl (OXY-IR) immediate release tablet 10 mg, 10 mg, Oral, Q4H PRN, Ziggy Loera DO, 10 mg at 02/25/21 6008    sennosides-docusate sodium (SENOKOT-S) 8.6-50 MG tablet 1 tablet, 1 tablet, Oral, BID, Ziggy Loera DO, 1 tablet at 02/25/21 0830    sodium chloride flush 0.9 % injection 10 mL, 10 mL, Intravenous, 2 times per day, Ziggy Loera DO, 10 mL at 02/25/21 0830    sodium chloride flush 0.9 % injection 10 mL, 10 mL, Intravenous, PRN, Ziggy Loera DO    buPROPion (WELLBUTRIN XL) extended release tablet 300 mg, 300 mg, Oral, QAM, Ziggy Loera DO, 300 mg at 02/25/21 0831    simvastatin (ZOCOR) tablet 40 mg, 40 mg, Oral, Nightly, Rachael Martins, DO    polyethylene glycol [facility]) packet 17 g, 17 g, Oral, Daily PRN, Tracy Colindres MD    Family History:   Family History   Problem Relation Age of Onset    Lung Cancer Father     Thyroid Disease Sister        Social/Functional History:  reports that she has never smoked. She has never used smokeless tobacco. She reports current alcohol use of about 1.0 standard drinks of alcohol per week. She reports that she does not use drugs. Premorbid Functional Status: Patient was independent with mobility/ambulation, transfers, ADL's, IADL's. Support System and Family Circumstances (i.e., potential caregivers): spouse / significant other   Home Environment / Accessibility: 2-story house, number of outside stairs: 3, bedroom on First floor, bathroom on First floor, tub only, walk-in shower is in the basement  Communication: follows at least 1-step commands and primary language: English    Review of Systems:Pertinent items are noted in HPI. The remainder of her comprehensive review of systems is negative    Physical Exam:    Vitals reviewed. I/O last 3 completed shifts: In: 10 [I.V.:10]  Out: -   I/O this shift:  In: 180 [P.O.:180]  Out: -   Vitals:    02/25/21 0829   BP: 138/60   Pulse: 89   Resp: 16   Temp: 98.7 °F (37.1 °C)   SpO2: 92%         GENERAL ASSESSMENT: well developed and well nourished  SKIN: normal color, no lesions and dressing clean and dry left knee. Well-healed scar at her right knee  HEAD: normocephalic  EYES: normal eyes  EARS: normal  NOSE: normal external appearance and nares patent  MOUTH: normal mouth and throat  NECK: normal  CHEST: normal air exchange, no rales, no rhonchi, no wheezes, respiratory effort normal with no retractions  HEART: regular rate and rhythm, normal S1/S2, quiet 1 out of 6 systolic murmur. No heaves or gallops.   ABDOMEN: soft, non-distended, no masses, no hepatosplenomegaly  GENITALIA: not examined  SPINE: spine normal, symmetric, mild hyperlordosis  EXTREMITY: normal and symmetric movement, leg lengths equal.  Well-healed scar at her right knee. Good range of motion. Slight increased valgus angle at the right knee. Alignment is otherwise good both lower extremities. Dressing clean and dry at the left knee. No calf tenderness or cords. Negative Homans' sign. No pain with range of motion of the upper extremities. Detailed Neurological Exam:  Mental Status: Alert, oriented, thought content appropriate    Cranial Nerves:  I: smell NA   II: visual acuity  NA   II: visual fields Full to confrontation   II: pupils RANDY   III,VII: ptosis None   III,IV,VI: extraocular muscles  Full ROM   V: mastication Normal   V: facial light touch sensation  Normal   V,VII: corneal reflex   N/A   VII: facial muscle function - upper  Normal   VII: facial muscle function - lower Normal   VIII: hearing Normal   IX: soft palate elevation  Normal   IX,X: gag reflex  N/A   XI: trapezius strength  5/5   XI: sternocleidomastoid strength 5/5   XI: neck flexion strength  5/5   XII: tongue strength  Normal           Motor:  Right   5/5              Left   5/5               Right Bicep  5/5           Left Bicep  5/5              Right Triceps   5/5       Left Trceps  5/5          Right Deltoid  5/5     Left Deltoid  5/5         Right IPS  4/5            Left IPS  4/5               Right Quadriceps  4+/5          Left Quadriceps    1/5           Right Gastrocnemius    2/5    Left Gastrocnemius   1/5 (testing limited due to weightbearing status)  Right Ant Tibialis   5/5  Left Ant Tibialis   5/5        Sensory:  normal to light touch and Both upper And both lower        Gait: Not tested due to safety concerns.       Coordination:   test for rapid alternating movements normal      DTR:   Right Brachioradialis reflex 1+  Left Brachioradialis reflex 1+  Right Biceps reflex 1+  Left Biceps reflex 1+  Right Triceps reflex 0  Left Triceps reflex 0  Right Quadriceps reflex 0  Left Quadriceps reflex NOT DONE  Right Achilles reflex 0  Left Achilles reflex 0      ASSESSMENT AND PLAN      Patient Active Problem List   Diagnosis    Abnormal nuclear stress test    Essential hypertension    Primary osteoarthritis of left knee    Hyperlipidemia    Fatty liver    Anxiety    Vitamin D deficiency    Class 3 severe obesity due to excess calories with serious comorbidity and body mass index (BMI) of 40.0 to 44.9 in [facility])    EKG abnormalities    Carotid stenosis, left    Pre-diabetes    Varicose veins of both lower extremities with pain    Chronic right-sided low back pain without sciatica    COVID-19    History of revision of total knee arthroplasty       1:  Primary indication for inpatient rehabilitation admission over other settings:  Orthopedic Disorders:  08.61  Unilateral Knee Replacement  2:  Comorbidities:  Comorbid Conditions in addition to those listed above Revision procedure with complications of tibial fragmentation  3. Estimated length of stay: 10 to 14 days  4. Anticipated disposition:  Home. The potential to achieve that is excellent.   5:  Precautions:  falls, infections, skin and weight bearing status:  touch down      Patient Active Problem List   Diagnosis    Abnormal nuclear stress test    Essential hypertension    Primary osteoarthritis of left knee    Hyperlipidemia    Fatty liver    Anxiety    Vitamin D deficiency    Class 3 severe obesity due to excess calories with serious comorbidity and body mass index (BMI) of 40.0 to 44.9 in [facility])    EKG abnormalities    Carotid stenosis, left    Pre-diabetes    Varicose veins of both lower extremities with pain    Chronic right-sided low back pain without sciatica    COVID-19    History of revision of total knee arthroplasty         Admitted for comprehensive inpatient rehabilitation including PT, OT, RT, SLP, and supportive services to improve functional outcome of impaired mobility, ADL's, transfers, and self-care. Rehabilitation Potential: excellent    IOPOC: I concur with the preadmission screen except as documented within this note. Care at a lower level would risk decline in function and increase risk of complications. Begin PT 90 minutes per day 5-7 days per week with focus on strengthening, balance, progressive ambulation, elevations and positioning; OT 90 minutes per day 5-7 days per week with focus on fine motor skills, ADLs, IADLs, energy conservation and equipment needs; rehab nursing 24/7 for skin care, bowel and bladder management, medication management, education, pain control and carryover and  for discharge planning.     Current Functional Status:     Initial Evaluation  2/25/21        Short Term Goals Long Term Goals    Was pt agreeable to Eval/treatment? Yes           Does pt have pain?  L knee pain 6/10           Bed Mobility  Rolling: SBA  Supine to sit: Rachel (LLE support)  Sit to supine: Rachel (LLE support)  Scooting: SBA     Sup Mod I   Transfers Sit to stand: Rachel  Stand to sit: cgA  Stand pivot: cgA/Rachel with bariatric ww     Sup with AAD Mod I with AAD   Ambulation   15 feet with bariatric ww with cgA/Rachel     50 feet with AAD with Sup 150 feet with AAD with Mod I   Walking 10 feet on uneven surface NT           Wheel Chair Mobility 15 feet with B UE and SBA     >150 feet with B UE with Sup  >300 feet with B UE with Mod I   Car Transfers NT     Sup with AAD Mod I with AAD   Stair negotiation: ascended and descended                     NT     2 steps with B rail with Sup 4 steps with 1 rail and AAD with Mod I   Curb Step:   ascended and descended 2 inch step with // bars and cg/Rachel (ascending BW)     4 inch step with AAD and Sup 4 inch step with AAD and Mod I   Picking up object off the floor NT           BLE ROM WFL           BLE Strength R LE 4/5  L LE: grossly  3-/5 (hip & knee),   3+/5 (ankle) (limited by pain)           Balance  Sitting: Independent  Standing: cgA with ww             Balance  Sitting Balance: Supervision  Standing Balance: Minimal assistance  Toilet Transfers  Toilet - Technique: Stand pivot  Equipment Used: Standard bedside commode  Toilet Transfer: Moderate assistance  Toilet Transfers Comments: pt require increased time to move & min vc's for safety      ADL  Feeding: Setup  Grooming: Supervision;Setup(seated)  UE Bathing: Stand by assistance  LE Bathing: Minimal assistance; Increased time to complete;Verbal cueing;Setup- sponge bathing both seated & standing  UE Dressing: Minimal assistance(pt require MIn A to don her bra & Sup to don t-shirt)  LE Dressing: Moderate assistance  Toileting: Moderate assistance  Additional Comments: Pt require increased time to move & min vc's for hand placement      Tone RUE  RUE Tone: Normotonic  Tone LUE  LUE Tone: Normotonic  Coordination  Movements Are Fluid And Coordinated: Yes  Bed mobility  Supine to Sit: Moderate assistance- Pt require assist to move LLE getting OOB on the right hand side of the bed  Scooting: Stand by assistance  Transfers  Stand Pivot Transfers: Minimal assistance  Sit to stand: Minimal assistance  Stand to sit: Minimal assistance      Vision - Basic Assessment  Prior Vision: Wears glasses all the time  Visual History: No significant visual history  Patient Visual Report: No visual complaint reported.      Cognition  Overall Cognitive Status: WFL  Sensation  Overall Sensation Status: WFL    Additional comments:I reviewed the patient's new clinical lab test results. Results for Ousmane Newell (MRN [id number]) as of 2/25/2021 16:33   Ref.  Range 2/25/2021 03:46   Sodium Latest Ref Range: 132 - 146 mmol/L 140   Potassium Latest Ref Range: 3.5 - 5.0 mmol/L 3.5   Chloride Latest Ref Range: 98 - 107 mmol/L 104   CO2 Latest Ref Range: 22 - 29 mmol/L 29   BUN Latest Ref Range: 8 - 23 mg/dL 6 (L)   Creatinine Latest Ref Range: 0.5 - 1.0 mg/dL 0.7   Anion Gap Latest Ref Range: 7 - 16 mmol/L 7   GFR Non- Latest Ref Range: >=60 mL/min/1.73 >60   GFR African American Unknown >60   Magnesium Latest Ref Range: 1.6 - 2.6 mg/dL 2.2   Glucose Latest Ref Range: 74 - 99 mg/dL 101 (H)   Calcium Latest Ref Range: 8.6 - 10.2 mg/dL 9.2   Total Protein Latest Ref Range: 6.4 - 8.3 g/dL 6.0 (L)   Albumin Latest Ref Range: 3.5 - 5.2 g/dL 3.3 (L)   Alk Phos Latest Ref Range: 35 - 104 U/L 122 (H)   ALT Latest Ref Range: 0 - 32 U/L 27   AST Latest Ref Range: 0 - 31 U/L 38 (H)   Bilirubin Latest Ref Range: 0.0 - 1.2 mg/dL 1.1   WBC Latest Ref Range: 4.5 - 11.5 E9/L 8.0   RBC Latest Ref Range: 3.50 - 5.50 E12/L 3.63   Hemoglobin Quant Latest Ref Range: 11.5 - 15.5 g/dL 11.1 (L)   Hematocrit Latest Ref Range: 34.0 - 48.0 % 32.4 (L)   MCV Latest Ref Range: 80.0 - 99.9 fL 89.3   MCH Latest Ref Range: 26.0 - 35.0 pg 30.6   MCHC Latest Ref Range: 32.0 - 34.5 % 34.3   MPV Latest Ref Range: 7.0 - 12.0 fL 10.9   RDW Latest Ref Range: 11.5 - 15.0 fL 12.9   Platelet Count Latest Ref Range: 130 - 450 E9/L 207   Neutrophils % Latest Ref Range: 43.0 - 80.0 % 68.2   Immature Granulocytes % Latest Ref Range: 0.0 - 5.0 % 0.6   Lymphocyte % Latest Ref Range: 20.0 - 42.0 % 21.6   Monocytes % Latest Ref Range: 2.0 - 12.0 % 6.5   Eosinophils % Latest Ref Range: 0.0 - 6.0 % 2.6   Basophils % Latest Ref Range: 0.0 - 2.0 % 0.5   Neutrophils Absolute Latest Ref Range: 1.80 - 7.30 E9/L 5.47   Immature Granulocytes # Latest Units: E9/L 0.05   Lymphocytes Absolute Latest Ref Range: 1.50 - 4.00 E9/L 1.73   Monocytes Absolute Latest Ref Range: 0.10 - 0.95 E9/L 0.52   Eosinophils Absolute Latest Ref Range: 0.05 - 0.50 E9/L 0.21   Basophils Absolute Latest Ref Range: 0.00 - 0.20 E9/L 0.04   Prothrombin Time Latest Ref Range: 9.3 - 12.4 sec 14.9 (H)   INR Unknown 1.3             Will increase bowel medications as she has not had a bowel movement since prior to her admission.

## 2021-02-25 NOTE — PROGRESS NOTES
Physical Therapy    Facility/Department: [address] REHAB  Treatment Note     NAME: Prieto Johnston  : 1950  MRN: [id number]    Date of Service: 2021    Evaluating Therapist: Renan Clancy DPT    ROOM: [address] [place] [address]  DIAGNOSIS: History of revision of total knee arthroplasty   PMH: Pt had elective surgery for revision of left total knee arthroplasty with explantation of previous femoral and tibial component and revision of both the femoral and entire tibial component on 21. PMH includes DJD, HLD, Essential HTN, OA, Anxiety, Chronic Back Pain, Liver Disease, Obesity, Tricuspid Regurgitation    PRECAUTIONS: TTWB LLE, L Hinged Knee Brace (0-30 AROM per VO (Dr. Emelyn Alex 21)    Social:  Pt lives with spouse in a [facility] home with 3 stairs to enter with single rail (R HR). Equipment Owned: [employer] and [employer]. Pt ambulated with no device independently PTA. Initial Evaluation  21     AM  PM Short Term Goals Long Term Goals    Was pt agreeable to Eval/treatment? Yes See eval  Yes      Does pt have pain?  L knee pain 6/10  L knee pain and nursing notified     Bed Mobility  Rolling: SBA  Supine to sit: Rachel (LLE support)  Sit to supine: Rachel (LLE support)  Scooting: SBA  NT Sup Mod I   Transfers Sit to stand: Rachel  Stand to sit: cgA  Stand pivot: cgA/Rachel with bariatric ww  Sit to stand min A   Stand to sit min A    Sup with AAD Mod I with AAD   Ambulation   15 feet with bariatric ww with cgA/Rachel  15 feet x2 with ww with CGA 50 feet with AAD with Sup 150 feet with AAD with Mod I   Walking 10 feet on uneven surface NT       Wheel Chair Mobility 15 feet with B UE and SBA   >150 feet with B UE with Sup  >300 feet with B UE with Mod I   Car Transfers NT   Sup with AAD Mod I with AAD   Stair negotiation: ascended and descended                     NT   2 steps with B rail with Sup 4 steps with 1 rail and AAD with Mod I   Curb Step:   ascended and descended 2 inch step with // bars and cg/Rachel (ascending BW)   4 inch step with AAD and Sup 4 inch step with AAD and Mod I   Picking up object off the floor NT       BLE ROM WFL       BLE Strength R LE 4/5  L LE: grossly  3-/5 (hip & knee),   3+/5 (ankle) (limited by pain)       Balance  Sitting: Independent  Standing: cgA with ww       Date Family Teach Completed TBD       Is additional Family Teaching Needed? Y or N Y       Hindering Progress L LE weakness, Balance, TTWB, Stairs       PT recommended ELOS 2 weeks       Team's Discharge Plan        Therapist at Team Meeting            Pt performed therapeutic exercise of the following:   PM   Ankle pumps ( B LE x20)   LAQs ( R LE x10)   Marching ( R LE x10)     Patient education  Pt was educated on TTWB L LE with transfers and ambulation     Patient response to education:   Pt verbalized understanding Pt demonstrated skill Pt requires further education in this area   x x x     Additional Comments: Pt reported L knee pain and nursing notified. Pt completed functional mobility as noted above. Increased assistance with transfers due to needing support with L LE due to pain. Pt requested to use bathroom and nursing notified. Pt was left sitting  with call light left by patient. Time in: 1345  Time out: 1415    Pt is making  progress toward established Physical Therapy goals. Continue with physical therapy current plan of care.     Sorin Hough Number: [id number]

## 2021-02-25 NOTE — PROGRESS NOTES
Physical Therapy    [facility]  Initial Assessment    NAME: Johana Henning  : 1950  MRN: [id number]    Date of Service: 2021    Evaluating Therapist: Rhea Torres DPT    ROOM: [address] [place] [address]  DIAGNOSIS: History of revision of total knee arthroplasty   PMH: Pt had elective surgery for revision of left total knee arthroplasty with explantation of previous femoral and tibial component and revision of both the femoral and entire tibial component on 21. PMH includes DJD, HLD, Essential HTN, OA, Anxiety, Chronic Back Pain, Liver Disease, Obesity, Tricuspid Regurgitation    PRECAUTIONS: TTWB LLE, L Hinged Knee Brace (0-30 AROM per VO (Dr. Viji Gracia 21)    Social:  Pt lives with spouse in a [facility] home with 3 stairs to enter with single rail (R HR). Equipment Owned: [employer] and [employer]. Pt ambulated with no device independently PTA. Initial Evaluation  21        Short Term Goals Long Term Goals    Was pt agreeable to Eval/treatment? Yes       Does pt have pain?  L knee pain 6/10       Bed Mobility  Rolling: SBA  Supine to sit: Rachel (LLE support)  Sit to supine: Rachel (LLE support)  Scooting: SBA   Sup Mod I   Transfers Sit to stand: Rachel  Stand to sit: cgA  Stand pivot: cgA/Rachel with bariatric ww   Sup with AAD Mod I with AAD   Ambulation   15 feet with bariatric ww with cgA/Rachel   50 feet with AAD with Sup 150 feet with AAD with Mod I   Walking 10 feet on uneven surface NT       Wheel Chair Mobility 15 feet with B UE and SBA   >150 feet with B UE with Sup  >300 feet with B UE with Mod I   Car Transfers NT   Sup with AAD Mod I with AAD   Stair negotiation: ascended and descended                     NT   2 steps with B rail with Sup 4 steps with 1 rail and AAD with Mod I   Curb Step:   ascended and descended 2 inch step with // bars and cg/Rachel (ascending BW)   4 inch step with AAD and Sup 4 inch step with AAD and Mod I   Picking up object off the floor NT       BLE [facility] BLE Strength R LE 4/5  L LE: grossly  3-/5 (hip & knee),   3+/5 (ankle) (limited by pain)       Balance  Sitting: Independent  Standing: cgA with ww       Date Family Teach Completed TBD       Is additional Family Teaching Needed? Y or N Y       Hindering Progress L LE weakness, Balance, TTWB, Stairs       PT recommended ELOS 2 weeks       Team's Discharge Plan        Therapist at Team Meeting          Pt is alert and Oriented x4  Sensation: Pt denies any numbness or tingling  Edema: None  Endurance: Fair +  Skin was inspected: Ace wrap in place, noted SHEA dressing       ASSESSMENT  Pt displays functional ability as noted in the objective portion of this evaluation. Comments:  Pt was sitting in w/c with L LE on leg rest upon entering room. Pt indicated 6/10 pain in L knee. Pt was able to demonstrate consistency with TTWB precaution during all transfers, ambulation, and curb step negotiation. During bed mobility, pt required Rachel only for assisting L LE on/off bed. While pt was in supine, hinge brace and ace wrap inspected with SHEA dressing noted. Education provided on performing exercises to promote blood flow/reduce DVT risk. Pt ambulated with ww with NR patterning fluctuating between [address] and TTWB'ing with modified 3 pt gait. In // bars, pt performed steps ups on 2 inch curb using BW ascending technique and B UEs. At end of session pt was educated on w/c propulsion technique, and she was able to demonstrate a short distance using B UEs. Pt will benefit from skilled PT to maximize independence and functional mobility to return home with . Pending progress, pt may require home modifications due to one HR and limited WBing status at this time (TTWBing).      Therapeutic Exercises:  Quad Set 1 x 5 reps  Glute Set 1 x5 reps  Ankle Pumps 1 x 5 reps  SAQ 1 x5 reps    Patient education  Pt educated on TTWB, hinge brace 0-30, safety with mobility, transfers, exercises to promote blood flow/DVT prevention, and role of PT    Patient response to education:   Pt verbalized understanding Pt demonstrated skill Pt requires further education in this area   Yes Yes Yes     Rehab potential is Good for reaching above PT goals. Pts/ family goals   1. Return Home    Patient and or family understand(s) diagnosis, prognosis, and plan of care. PLAN  PT care will be provided in accordance with the objectives noted above. Whenever appropriate, clear delegation orders will be provided for nursing staff. Exercises and functional mobility practice will be used as well as appropriate assistive devices or modalities to obtain goals. Patient and family education will also be administered as needed. Frequency of treatments will be 2x/day M-F and 1x/day Sat or Sun x  14 days.     Time in: 1000  Time out: Jimmy Mann   [id number]  MIGNON Ordonez

## 2021-02-25 NOTE — PROGRESS NOTES
Occupational Therapy   Occupational Therapy Initial Assessment  Date: 2021   Patient Name: Jason Batres  MRN: [id number]     : 1950  Room: 5512B    Referring Practitioner: Eliot Zendejas MD  Diagnosis: LTKR- aseptic infection & loosening of component  Additional Pertinent Hx: HTN, OA & obesity  Restrictions: Fall Risk, LLE TTWB & homa brace 0-30    Date of Service: 2021    Discharge Recommendations:  Home independently     Subjective   Chart Reviewed: Yes  Family / Caregiver Present: No  Subjective: Pt presents supine in bed & was agreeable to OT intervention  Pain Comments: Pt did c/o L hanna pain durign OT session but did not hinder pt functional performance    Social/Functional History  Lives With: Spouse  Type of Home: House  Home Layout: Multi-level  Home Access: Stairs to enter with rails  Entrance Stairs - Number of Steps: 3 MITCHELL 1HR  Bathroom Shower/Tub: Tub only, Walk-in shower- tub upstairs & walk in shower she used in basement  [place] Toilet: Standard  Home Equipment: Cane, Rolling walker- PTA pt only using cane in community  ADL Assistance: [address] Responsibilities: Yes  Meal Prep Responsibility: Primary  Cleaning Responsibility: Primary  Bill Paying/Finance Responsibility: Primary  Ambulation Assistance: Independent  Transfer Assistance: Independent  Occupation: Retired  Type of occupation: [employer]  IADL Comments: PLOF pt independent in all areas     Objective   Vision: Impaired  Vision Exceptions: Wears glasses at all times  Hearing: Within functional limits      Orientation  Overall Orientation Status: Within Functional Limits     Observation/Palpation  Posture: Fair     Balance  Sitting Balance: Supervision  Standing Balance: Minimal assistance  Toilet Transfers  Toilet - Technique: Stand pivot  Equipment Used: Standard bedside commode  Toilet Transfer:  Moderate assistance  Toilet Transfers Comments: pt require increased time to move & min vc's for safety     ADL  Feeding: Setup  Grooming: Supervision;Setup(seated)  UE Bathing: Stand by assistance  LE Bathing: Minimal assistance; Increased time to complete;Verbal cueing;Setup- sponge bathing both seated & standing  UE Dressing: Minimal assistance(pt require MIn A to don her bra & Sup to don t-shirt)  LE Dressing: Moderate assistance  Toileting: Moderate assistance  Additional Comments: Pt require increased time to move & min vc's for hand placement     Tone RUE  RUE Tone: Normotonic  Tone LUE  LUE Tone: Normotonic  Coordination  Movements Are Fluid And Coordinated: Yes     Bed mobility  Supine to Sit: Moderate assistance- Pt require assist to move LLE getting OOB on the right hand side of the bed  Scooting: Stand by assistance  Transfers  Stand Pivot Transfers: Minimal assistance  Sit to stand: Minimal assistance  Stand to sit: Minimal assistance     Vision - Basic Assessment  Prior Vision: Wears glasses all the time  Visual History: No significant visual history  Patient Visual Report: No visual complaint reported.      Cognition  Overall Cognitive Status: WFL     Sensation  Overall Sensation Status: WFL      BUE AROM: BUE AROM in all planes WFL  LUE & RUE AROM: B hand grasp & relelase WFL    BUE Strength  B Hand General: 4+/5  BUE Strength Comment: BUE strength 4+/5     Hand Dominance: Right    Left Hand Strength -   Handle Setting 2: 38# & norm for pt age & gender is 42#  Right Hand Strength -    Handle Setting 2: 42# & norm for pt age & gender is 50#    Fine Motor Skills  Left 9-Hole Peg Test: 22.1. seconds & norm for pt age & gender is 22.5 seconds  Right 9-Hole Peg Test: 22.5 seconds & norm for pt age & gender is 24.1 seconds     Plan   Times per week: OT twice a day for 2 weeks to address above problem areas  Times per day: Twice a day  Current Treatment Recommendations: Strengthening, Endurance Training, Patient/Caregiver Education & Training, Equipment Evaluation, Education, & procurement, Self-Care / ADL, Balance Training, Gait Training, Home Management Training, Functional Mobility Training, Safety Education & Training    Goals  Long term goals  Time Frame for Long term goals : 2 weeks to address above problem areas  Long term goal 1: Pt demo independent to eat all meals  Long term goal 2: Pt demo Mod I grooming seated or standing  Long term goal 3: Pt demo SBA to sponge bathe both seated & standing  Long term goal 4: Pt demo I UE & Mod I LE dressinculding underwear, pants, socks & shoes using AE as needed  Long term goal 5: Pt demo Mod I commode trf & Mod I toileting & use approrpaite DME to ensure pt safety  Long term goals 6: Pt demo SBA walk in shower trf using appropraite DME to ensurept safety  Long term goal 7: Pt demo Mod I light homemaking activity & demo G- safety & insight  Long term goal 8: Pt demo G- endurance for a 20-30 minute functional activity  Long term goal 9: Pt demo independent to complete a BUE strengtheing home exercise program  Patient Goals   Patient goals : \"I want to take care of myself @ home. \"     Therapy Time   Individual Concurrent Group Co-treatment   Time In 650-OTeval  700-Melchor rx         Time Out 700-Ot eval  740-OT rx         Minutes 50 Min OT total   10 Min OT eval  40 Min OT rx            Henrietta Fraser, OTR/L [id number]

## 2021-02-25 NOTE — PROGRESS NOTES
Physical Therapy    Facility/Department: [facility]SE REHAB  Weekly Note     NAME: Heath Astorga  : 1950  MRN: [id number]    Date of Service: 2021    Evaluating Therapist: Payton Galarza DPT    ROOM: [address] [place] [address]  DIAGNOSIS: History of revision of total knee arthroplasty   PMH: Pt had elective surgery for revision of left total knee arthroplasty with explantation of previous femoral and tibial component and revision of both the femoral and entire tibial component on 21. PMH includes DJD, HLD, Essential HTN, OA, Anxiety, Chronic Back Pain, Liver Disease, Obesity, Tricuspid Regurgitation    PRECAUTIONS: TTWB LLE, L Hinged Knee Brace (0-30 AROM per VO (Dr. Mccormack Mariluz 21)    Social:  Pt lives with spouse in a [facility] home with 3 stairs to enter with single rail (R HR). Equipment Owned: [employer] and [employer]. Pt ambulated with no device independently PTA. Initial Evaluation  21      Short Term Goals Long Term Goals    Was pt agreeable to Eval/treatment? Yes        Does pt have pain?  L knee pain 6/10       Bed Mobility  Rolling: SBA  Supine to sit: Rachel (LLE support)  Sit to supine: Rachel (LLE support)  Scooting: SBA   Sup Mod I   Transfers Sit to stand: Rachel  Stand to sit: cgA  Stand pivot: cgA/Rachel with bariatric ww   Sup with AAD Mod I with AAD   Ambulation   15 feet with bariatric ww with cgA/Rachel   50 feet with AAD with Sup 150 feet with AAD with Mod I   Walking 10 feet on uneven surface NT       Wheel Chair Mobility 15 feet with B UE and SBA   >150 feet with B UE with Sup  >300 feet with B UE with Mod I   Car Transfers NT   Sup with AAD Mod I with AAD   Stair negotiation: ascended and descended                     NT   2 steps with B rail with Sup 4 steps with 1 rail and AAD with Mod I   Curb Step:   ascended and descended 2 inch step with // bars and cg/Rachel (ascending BW)   4 inch step with AAD and Sup 4 inch step with AAD and Mod I   Picking up object off the floor NT       BLE ROM WFL       BLE Strength R LE 4/5  L LE: grossly  3-/5 (hip & knee),   3+/5 (ankle) (limited by pain)       Balance  Sitting: Independent  Standing: cgA with ww       Date Family Teach Completed TBD       Is additional Family Teaching Needed? Y or N Y       Hindering Progress L LE weakness, Balance, TTWB, Stairs       PT recommended ELOS 2 weeks       Team's Discharge Plan 2 weeks       Therapist at [facility], [facility] Marylen Artis, [place], [place] [id number]           Date:  2/25/2021  Supporting factors: Motivated to participate in therapy  Barriers to discharge:  L LE weakness, TTWB L LE,   Additional comments: Will continue to focus on strength and functional mobility especially with stair negotiation. Pt may need a 2nd rail installed to enter home vs ramp pending progress.    DME:  TBD  After Care:  TBD    Dylan Okeefe [id number]

## 2021-02-26 LAB — CULTURE SURGICAL: NORMAL

## 2021-02-26 PROCEDURE — 6370000000 HC RX 637 (ALT 250 FOR IP): Performed by: STUDENT IN AN ORGANIZED HEALTH CARE EDUCATION/TRAINING PROGRAM

## 2021-02-26 PROCEDURE — 97110 THERAPEUTIC EXERCISES: CPT

## 2021-02-26 PROCEDURE — 1280000000 HC REHAB R&B

## 2021-02-26 PROCEDURE — 97530 THERAPEUTIC ACTIVITIES: CPT

## 2021-02-26 PROCEDURE — 6370000000 HC RX 637 (ALT 250 FOR IP): Performed by: PHYSICAL MEDICINE & REHABILITATION

## 2021-02-26 PROCEDURE — 97535 SELF CARE MNGMENT TRAINING: CPT

## 2021-02-26 RX ORDER — LACTULOSE 10 G/15ML
20 SOLUTION ORAL DAILY PRN
Status: DISCONTINUED | OUTPATIENT
Start: 2021-02-26 | End: 2021-03-09 | Stop reason: HOSPADM

## 2021-02-26 RX ADMIN — OXYCODONE HYDROCHLORIDE 10 MG: 10 TABLET ORAL at 03:38

## 2021-02-26 RX ADMIN — OXYCODONE HYDROCHLORIDE 10 MG: 10 TABLET ORAL at 21:46

## 2021-02-26 RX ADMIN — DOCUSATE SODIUM 50 MG AND SENNOSIDES 8.6 MG 1 TABLET: 8.6; 5 TABLET, FILM COATED ORAL at 09:00

## 2021-02-26 RX ADMIN — ACETAMINOPHEN 650 MG: 325 TABLET ORAL at 04:56

## 2021-02-26 RX ADMIN — ACETAMINOPHEN 650 MG: 325 TABLET ORAL at 11:27

## 2021-02-26 RX ADMIN — Medication 40 MG: at 21:41

## 2021-02-26 RX ADMIN — OXYCODONE HYDROCHLORIDE 10 MG: 10 TABLET ORAL at 17:25

## 2021-02-26 RX ADMIN — ASPIRIN 325 MG: 325 TABLET, COATED ORAL at 08:59

## 2021-02-26 RX ADMIN — ACETAMINOPHEN 650 MG: 325 TABLET ORAL at 17:23

## 2021-02-26 RX ADMIN — ACETAMINOPHEN 650 MG: 325 TABLET ORAL at 23:43

## 2021-02-26 RX ADMIN — BUPROPION HYDROCHLORIDE 300 MG: 300 TABLET, FILM COATED, EXTENDED RELEASE ORAL at 09:00

## 2021-02-26 RX ADMIN — DOCUSATE SODIUM 50 MG AND SENNOSIDES 8.6 MG 1 TABLET: 8.6; 5 TABLET, FILM COATED ORAL at 21:41

## 2021-02-26 RX ADMIN — ASPIRIN 325 MG: 325 TABLET, COATED ORAL at 21:41

## 2021-02-26 ASSESSMENT — PAIN DESCRIPTION - DESCRIPTORS
DESCRIPTORS: DISCOMFORT
DESCRIPTORS: ACHING;DISCOMFORT

## 2021-02-26 ASSESSMENT — PAIN DESCRIPTION - LOCATION
LOCATION: KNEE

## 2021-02-26 ASSESSMENT — PAIN DESCRIPTION - FREQUENCY
FREQUENCY: CONTINUOUS

## 2021-02-26 ASSESSMENT — PAIN DESCRIPTION - PROGRESSION
CLINICAL_PROGRESSION: NOT CHANGED
CLINICAL_PROGRESSION: GRADUALLY IMPROVING
CLINICAL_PROGRESSION: GRADUALLY WORSENING
CLINICAL_PROGRESSION: GRADUALLY IMPROVING

## 2021-02-26 ASSESSMENT — PAIN DESCRIPTION - ONSET
ONSET: ON-GOING
ONSET: ON-GOING

## 2021-02-26 ASSESSMENT — PAIN SCALES - GENERAL
PAINLEVEL_OUTOF10: 5
PAINLEVEL_OUTOF10: 6
PAINLEVEL_OUTOF10: 5
PAINLEVEL_OUTOF10: 6
PAINLEVEL_OUTOF10: 7

## 2021-02-26 ASSESSMENT — PAIN DESCRIPTION - PAIN TYPE
TYPE: ACUTE PAIN;SURGICAL PAIN
TYPE: ACUTE PAIN;SURGICAL PAIN
TYPE: SURGICAL PAIN;ACUTE PAIN
TYPE: ACUTE PAIN;SURGICAL PAIN

## 2021-02-26 ASSESSMENT — PAIN DESCRIPTION - ORIENTATION
ORIENTATION: LEFT

## 2021-02-26 ASSESSMENT — PAIN - FUNCTIONAL ASSESSMENT
PAIN_FUNCTIONAL_ASSESSMENT: PREVENTS OR INTERFERES SOME ACTIVE ACTIVITIES AND ADLS

## 2021-02-26 ASSESSMENT — PAIN SCALES - WONG BAKER: WONGBAKER_NUMERICALRESPONSE: 0

## 2021-02-26 NOTE — PROGRESS NOTES
Progress Note    Subjective/   65y.o. year old female on the rehab unit for revision knee arthroplasty. Pain control. Did move her bowels and would like the lactulose stopped. No SOB or chest pain. Objective/   VITALS:  BP (!) 141/64   Pulse 87   Temp 98 °F (36.7 °C) (Temporal)   Resp 16   Ht 5' 5\" (1.651 m)   Wt 260 lb (117.9 kg)   SpO2 93%   BMI 43.27 kg/m²   24HR INTAKE/OUTPUT:      Intake/Output Summary (Last 24 hours) at 2/26/2021 0846  Last data filed at 2/25/2021 1730  Gross per 24 hour   Intake 1080 ml   Output --   Net 1080 ml     Constitutional:  Alert, awake, no apparent distress   Cardiovascular:  S1, S2 without m/r/g   Respiratory:  CTA B without w/r/r   Abdomen: +BS  Ext: no pitting LE edema, orthosis in place, alignment good  Neuro: awake and alert, oriented x3, light touch intact    Functional Level    Initial Evaluation  2/25/21     AM  PM Short Term Goals Long Term Goals    Was pt agreeable to Eval/treatment? Yes Yes  Yes        Does pt have pain? L knee pain 6/10 L knee pain and nursing notified.    L knee pain       Bed Mobility  Rolling: SBA  Supine to sit: Rachel (LLE support)  Sit to supine: Rachel (LLE support)  Scooting: SBA Sit to supine min A for L LE management   Supine to sit min A for L LE management  NT Sup Mod I   Transfers Sit to stand: Rachel  Stand to sit: cgA  Stand pivot: cgA/Rachel with bariatric ww Sit to stand CGA  Stand to sit CGA  Stand pivot with bariatric ww with CGA Sit to stand CGA   Stand to sit CGA  Stand pivot with bariatric ww with CGA    Sup with AAD Mod I with AAD   Ambulation   15 feet with bariatric ww with cgA/Rachel 20 feet x2 with bariatric ww with CGA 15 feet x2 with ww with CGA 50 feet with AAD with Sup 150 feet with AAD with Mod I   Walking 10 feet on uneven surface NT NT NT       Wheel Chair Mobility 15 feet with B UE and SBA NT NT >150 feet with B UE with Sup  >300 feet with B UE with Mod I   Car Transfers NT Nt NT Sup with AAD Mod I with serious comorbidity and body mass index (BMI) of 40.0 to 44.9 in adult [facility])     EKG abnormalities     Carotid stenosis, left     Pre-diabetes     Varicose veins of both lower extremities with pain     Chronic right-sided low back pain without sciatica     COVID-19     History of revision of total knee arthroplasty      Plan/  1. See team conference note - ELOS 2 weeks  2. Pain controlled with medicine  3. Continue weight bearing status TT on the (L)  4. Will change lactulose to PRN  5. Continue dvt prophylaxis  6. Continue to increase mobility as able.            Sandra Hawkins MD

## 2021-02-26 NOTE — PROGRESS NOTES
Physical Therapy    Facility/Department: [facility] 5S REHAB  Treatment Note     NAME: Donovan Nava  : 1950  MRN: [id number]    Date of Service: 2021    Evaluating Therapist: Moose Croft DPT    ROOM: [address] [place] [address]  DIAGNOSIS: History of revision of total knee arthroplasty   PMH: Pt had elective surgery for revision of left total knee arthroplasty with explantation of previous femoral and tibial component and revision of both the femoral and entire tibial component on 21. PMH includes DJD, HLD, Essential HTN, OA, Anxiety, Chronic Back Pain, Liver Disease, Obesity, Tricuspid Regurgitation    PRECAUTIONS: TTWB LLE, L Hinged Knee Brace (0-30 AROM per VO (Dr. Mera Course 21)    Social:  Pt lives with spouse in a [facility] home with 3 stairs to enter with single rail (R HR). Equipment Owned: [employer] and [employer]. Pt ambulated with no device independently PTA. Initial Evaluation  21     AM  PM Short Term Goals Long Term Goals    Was pt agreeable to Eval/treatment? Yes Yes  Yes      Does pt have pain? L knee pain 6/10 L knee pain and nursing notified.    L knee pain     Bed Mobility  Rolling: SBA  Supine to sit: Rachel (LLE support)  Sit to supine: Rachel (LLE support)  Scooting: SBA Sit to supine min A for L LE management   Supine to sit min A for L LE management  NT Sup Mod I   Transfers Sit to stand: Rachel  Stand to sit: cgA  Stand pivot: cgA/Rachel with bariatric ww Sit to stand CGA  Stand to sit CGA  Stand pivot with bariatric ww with CGA Sit to stand CGA   Stand to sit CGA  Stand pivot with bariatric ww with CGA   Sup with AAD Mod I with AAD   Ambulation   15 feet with bariatric ww with cgA/Rachel 20 feet x2 with bariatric ww with CGA 15 feet x2 with ww with CGA 50 feet with AAD with Sup 150 feet with AAD with Mod I   Walking 10 feet on uneven surface NT NT NT     Wheel Chair Mobility 15 feet with B UE and SBA NT NT >150 feet with B UE with Sup  >300 feet with B UE with Mod I   Car Transfers NT Nt NT Sup with AAD Mod I with AAD   Stair negotiation: ascended and descended                     NT NT NT 2 steps with B rail with Sup 4 steps with 1 rail and AAD with Mod I   Curb Step:   ascended and descended 2 inch step with // bars and cg/Rachel (ascending BW) NT NT 4 inch step with AAD and Sup 4 inch step with AAD and Mod I   Picking up object off the floor NT       BLE ROM WFL       BLE Strength R LE 4/5  L LE: grossly  3-/5 (hip & knee),   3+/5 (ankle) (limited by pain)       Balance  Sitting: Independent  Standing: cgA with ww       Date Family Teach Completed TBD       Is additional Family Teaching Needed? Y or N Y       Hindering Progress L LE weakness, Balance, TTWB, Stairs       PT recommended ELOS 2 weeks       Team's Discharge Plan        Therapist at Team Meeting            Pt performed therapeutic exercise of the following:   PM   Step ups using 2 inch in parallel bars ( ascends backward/descends forward ) x5 reps ( min/cga)   Step up using 4 inch in parallel bars ( ascends backward/descends forward ) x5 reps ( min A )     Patient education  Pt was educated on TTWB L LE with transfers and ambulation     Patient response to education:   Pt verbalized understanding Pt demonstrated skill Pt requires further education in this area   x x x     Additional Comments: Pt reported L knee pain and nursing notified. Nursing arrived during treatment to medicate pt for pain. Pt completed functional mobility as noted above. Pt completed exercises as noted above. Education on proper hinged knee brace fitting and how to adjust it. Pt voiced understanding. Will focus on stair negotiation during future treatment sessions. PM, Pt completed functional mobility as noted above. Pt able to maintain TTWB L LE with transfers and ambulation . Pt also able to maintain TTWB L LE with step ups however pt fatigues quickly. Pt was left sitting  with call light left by patient.     Time in: 1400  Time out: 1430    Pt is making  progress toward established Physical Therapy goals. Continue with physical therapy current plan of care.     [address] BETH Hough Number: [id number]

## 2021-02-26 NOTE — PATIENT CARE CONFERENCE
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 IMPLANTATION OF ANTIBIOTIC SPACER 21  Impairment Group Code:  8.61      MEDICAL/FUNCTIONAL HISTORY/STATUS:  pain controlled, allowed toe touch wt. bearing to left lower, comorbids of morbid obesity with BMI of 43.27, hypertension, hyperlipidemia    Consultations/Labs/X-rays: labs ok from 21      MEDICATION UPDATE:  ASA for DVT prophylaxis, Oxy-IR and Roxicodone for pain      NURSING :    Bowel:   Always Continent  []   Occasionally incontinent  []   Frequently incontinent  []   Always incontinent  []   Not occurred  [x]     Bladder:   Always Continent  [x]    Incontinent less than daily[]   Incontinent  daily []   Always incontinent  []   No urine output    []   Indwelling catheter []       Toilet Hygiene:   Current level : mod assist  Short term Toilet hygiene goal: min assist  Long term toilet hygiene goal:  independent    Skin integrity: red areas  Pain: left knee pain, meds effective    NUTRITION    Diet  general  Liquid consistency   thin    SOCIAL INFORMATION:  Lives with: spouse  Prior community services:  none  Home Architecture:  [facility] cod 3 with entry 1 rail, 12 down to walk in shower in basement  Prior Level of function:  independent, retired  DME:  straight cane ,wheeled walker, elevated toilet seat    FAMILY / PATIENT EDUCATION:  safety and self care are ongoing    PHYSICAL THERAPY    Bed mobility:   Current level: standby assist  Short term bed mobility goal: supervision  Long term bed mobility goal: Modified independent    Chair/bed transfers:  Current level: Contact guard assist-min assist  Short term Chair/bed transfers goal: supervision  Long term Chair/bed transfers goal: Modified independent      Ambulation:   Current level: 15 ft bariatric wheeled walker min assist, toe touch wt. bearing  left lower  Short term ambulation goal: 50 ft.  at supervision  Long term ambulation goal: 150 ft.  at Modified independent    Wheelchair Mobility:  Current level: 15 ft using uppers at standby assist  Short term wheelchair goal: 150 ft at supervision  Long term wheelchair goal: 300 ft at Modified independent      Car transfers:   Current level: to be assessed    Stairs:   Current level : to be assessed-doing 2 inch step in parallel bars  at Contact guard assist      OCCUPATIONAL THERAPY:      Tub/shower:   Current level: to be assessed      Feeding:  Current level: independent  Short term feeding goal: independent  Long term feeding goal: independent    Grooming:   Current level: Modified independent  Short term grooming goal: Modified independent  Long term grooming goal: Modified independent    Bathing:  Current level: min assist, sponge  Short term bathing goal: Contact guard assist  Long term bathing goal: standby assist    Homemaking:   Current level: to be assessed    Upper body dressing:  Current level: min assist  Short term upper body dressing goal: supervision  Long term upper body dressing goal: Modified independent    Lower body dressing:                                                                          Current level: mod assist-max assist             Short term lower body dressing goal: min assist          Long term lower body dressing goal: Modified independent            Footwear  Current level: mod assist-max assist with adaptive equipment  Short term goal: min assist  Long term goal :Modified independent      Toilet transfer:   Current level: mod assist  Short term toilet transfer goal: min assist  Long term toilet transfer goal: Modified independent    Patient/family's personal goals: \"take care of myself at home\"  Factors supporting goal achievement:  motivated  Factors hindering goal achievement:  wt. bearing restriction, morbid obesity      Discharge Plan   Estimated Length of Stay: 2 weeks            Destination: home  Services at Discharge: to be assessed  Equipment at Discharge: to be assessed      INTERDISCIPLINARY TEAM/PHYSICIAN Courtney Motapike:  focus on steps      I approve the established interdisciplinary plan of care as documented within the medical record of Prieto Johnston. Electronically signed by Watson Mendenhall RN  on 2/26/2021 at 8:46 AM  The following interdisciplinary team members were present:  NATHAN Valle MSSA,LSW  Delroy Borges.  RUBIA Conway, OTR

## 2021-02-26 NOTE — PROGRESS NOTES
OCCUPATIONAL THERAPY DAILY NOTE    Date:2021  Patient Name: Heath Astorga  MRN: [id number]  : 1950  Room: [address] [place], [place] [address]     Diagnosis: LTKR- aseptic infection & loosening of component    Precautions: Fall risk, L LE TTWB & [place] brace 0-30*    Functional Assessment:   Date Status AE  Comments   Feeding 21 Setup     Grooming 21 S/Setup seated Pt washed hands following toilet needs seated up in w/c. Oral Care 21 S/Setup     Bathing 21 Min A     UB Dressing 21 Min A     LB Dressing 21 Mod A     Footwear 21 Mod A LB AE . Toileting 21 Min A  Min assist with toilet hygiene and clothing mgmt using grab bar for balance. Homemaking 21 Min A W/c level   reacher Pt engaged in w/c mobility to open/close fridge and drawers then assisted with opening coffee sleeves and placing up on counter top in order to put away in cabinet. Pt used reacher for item retrieval off floor while up in w/c. Pt completed cleaning up task wiping down clothespins seated at table. Functional Transfers / Balance:   Date Status DME  Comments   Sit Balance 21 Supervision   Sitting balance up in w/c during hmkg/kitchen mobility, on commode and during arm ex's. Stand Balance 21 Min A Grab bar Standing balance using grab bar during w/c<>commode transfers along with clothing mgmt following TTWB LLE.   [] Tub  [] Shower   Transfer  TBA     Commode   Transfer 21 Min A Grab bar Pt completed w/c<>commode transfers using grab bar and following TTWB LLE. Functional   Mobility 21 CGA/Min A W/c propulsion Short distance with w/c propelling in OT kitchen backwards/forwards and turning around. Other:         Functional Exercises / Activity:   BUE AROM ex's seated at table during resistive clothespin activity to increase ROM/strength for ADL's, transfers and mobility along with coordination and fine motor skills.   Red putty/wheel ex's at table top to increase overall strength & endurance for functional activities. Pt completed kitchen mobility training at w/c level needing Min Assist to increase hmkg skills due to TTWB LLE precautions. W/c<>commode transfers using grab bar for balance while maintaining LLE wt bearing restrictions with brace. Sensory / Neuromuscular Re-Education:      Cognitive Skills:   Status Comments   Problem   Solving good     Memory good     Sequencing fair +    Safety fair       Visual Perception:    Education:  -Pt educated with safety during w/c<>commode transfers using grab bar while following TTWB LLE. recommend continued education. [] Family teach completed on:    Pain Level: 5/10 L LE. Additional Notes:       Patient has made good  progress during treatment sessions toward set goals. Therapy emphasis to obtain goals:   Current Treatment Recommendations: Strengthening, Endurance Training, Patient/Caregiver Education & Training, Equipment Evaluation, Education, & procurement, Self-Care / ADL, Balance Training, Gait Training, Home Management Training, Functional Mobility Training, Safety Education & Training    Long term goal 1: Pt demo independent to eat all meals  Long term goal 2: Pt demo Mod I grooming seated or standing  Long term goal 3: Pt demo SBA to sponge bathe both seated & standing  Long term goal 4: Pt demo I UE & Mod I LE dressinculding underwear, pants, socks & shoes using AE as needed  Long term goal 5: Pt demo Mod I commode trf & Mod I toileting & use approrpaite DME to ensure pt safety  Long term goals 6: Pt demo SBA walk in shower trf using appropraite DME to ensurept safety  Long term goal 7: Pt demo Mod I light homemaking activity & demo G- safety & insight  Long term goal 8: Pt demo G- endurance for a 20-30 minute functional activity  Long term goal 9: Pt demo independent to complete a BUE strengtheing home exercise program    [x] Continue with current OT Plan of care.   [] Prepare for Discharge     DISCHARGE RECOMMENDATIONS  Recommended DME: LB AE    Post Discharge Care:   []Home Independently  []Home with 24hr Care / Supervision []Home with Partial Supervision []Home with Home Health OT []Home with Out Pt OT []Other: ___   Comments:         Time in Time out Tx Time Breakdown  Variance:   First Session  10:50am 11:35am [x] Individual Tx-45  [] Concurrent Tx -  [] Co-Tx -   [] Group Tx -   [] Time Missed -     Second Session 13:00pm 13:45pm [] Individual Tx- Mins   [x] Concurrent Tx -45  [] Co-Tx -   [] Group Tx -   [] Time Missed -     Third Session    [] Individual Tx-   [] Concurrent Tx -  [] Co-Tx -   [] Group Tx -   [] Time Missed -         Total Tx Time  90 Mins   Elton Angeles [facility]

## 2021-02-27 LAB
ANAEROBIC CULTURE: NORMAL
ANAEROBIC CULTURE: NORMAL
CULTURE SURGICAL: NORMAL

## 2021-02-27 PROCEDURE — 99233 SBSQ HOSP IP/OBS HIGH 50: CPT | Performed by: PHYSICAL MEDICINE & REHABILITATION

## 2021-02-27 PROCEDURE — 97535 SELF CARE MNGMENT TRAINING: CPT

## 2021-02-27 PROCEDURE — 6370000000 HC RX 637 (ALT 250 FOR IP): Performed by: PHYSICAL MEDICINE & REHABILITATION

## 2021-02-27 PROCEDURE — 6370000000 HC RX 637 (ALT 250 FOR IP): Performed by: STUDENT IN AN ORGANIZED HEALTH CARE EDUCATION/TRAINING PROGRAM

## 2021-02-27 PROCEDURE — 1280000000 HC REHAB R&B

## 2021-02-27 RX ORDER — AMLODIPINE BESYLATE 5 MG/1
5 TABLET ORAL DAILY
Status: DISCONTINUED | OUTPATIENT
Start: 2021-02-27 | End: 2021-03-09 | Stop reason: HOSPADM

## 2021-02-27 RX ORDER — HYDRALAZINE HYDROCHLORIDE 25 MG/1
25 TABLET, FILM COATED ORAL EVERY 8 HOURS PRN
Status: DISCONTINUED | OUTPATIENT
Start: 2021-02-27 | End: 2021-03-09 | Stop reason: HOSPADM

## 2021-02-27 RX ADMIN — ASPIRIN 325 MG: 325 TABLET, COATED ORAL at 07:52

## 2021-02-27 RX ADMIN — ACETAMINOPHEN 650 MG: 325 TABLET ORAL at 06:26

## 2021-02-27 RX ADMIN — DOCUSATE SODIUM 50 MG AND SENNOSIDES 8.6 MG 1 TABLET: 8.6; 5 TABLET, FILM COATED ORAL at 07:52

## 2021-02-27 RX ADMIN — ASPIRIN 325 MG: 325 TABLET, COATED ORAL at 21:00

## 2021-02-27 RX ADMIN — BUPROPION HYDROCHLORIDE 300 MG: 300 TABLET, FILM COATED, EXTENDED RELEASE ORAL at 07:53

## 2021-02-27 RX ADMIN — ACETAMINOPHEN 650 MG: 325 TABLET ORAL at 17:01

## 2021-02-27 RX ADMIN — OXYCODONE HYDROCHLORIDE 10 MG: 10 TABLET ORAL at 12:27

## 2021-02-27 RX ADMIN — DOCUSATE SODIUM 50 MG AND SENNOSIDES 8.6 MG 1 TABLET: 8.6; 5 TABLET, FILM COATED ORAL at 21:00

## 2021-02-27 RX ADMIN — OXYCODONE HYDROCHLORIDE 10 MG: 10 TABLET ORAL at 07:57

## 2021-02-27 RX ADMIN — Medication 40 MG: at 20:59

## 2021-02-27 RX ADMIN — ACETAMINOPHEN 650 MG: 325 TABLET ORAL at 11:21

## 2021-02-27 RX ADMIN — AMLODIPINE BESYLATE 5 MG: 5 TABLET ORAL at 10:19

## 2021-02-27 RX ADMIN — ACETAMINOPHEN 650 MG: 325 TABLET ORAL at 23:57

## 2021-02-27 RX ADMIN — OXYCODONE HYDROCHLORIDE 10 MG: 10 TABLET ORAL at 21:00

## 2021-02-27 ASSESSMENT — PAIN DESCRIPTION - FREQUENCY
FREQUENCY: CONTINUOUS

## 2021-02-27 ASSESSMENT — PAIN SCALES - GENERAL
PAINLEVEL_OUTOF10: 0
PAINLEVEL_OUTOF10: 6
PAINLEVEL_OUTOF10: 7
PAINLEVEL_OUTOF10: 7
PAINLEVEL_OUTOF10: 5
PAINLEVEL_OUTOF10: 7
PAINLEVEL_OUTOF10: 6

## 2021-02-27 ASSESSMENT — PAIN DESCRIPTION - ONSET
ONSET: ON-GOING
ONSET: ON-GOING

## 2021-02-27 ASSESSMENT — PAIN SCALES - WONG BAKER
WONGBAKER_NUMERICALRESPONSE: 0
WONGBAKER_NUMERICALRESPONSE: 0

## 2021-02-27 ASSESSMENT — PAIN DESCRIPTION - PAIN TYPE
TYPE: SURGICAL PAIN

## 2021-02-27 ASSESSMENT — PAIN - FUNCTIONAL ASSESSMENT
PAIN_FUNCTIONAL_ASSESSMENT: PREVENTS OR INTERFERES SOME ACTIVE ACTIVITIES AND ADLS
PAIN_FUNCTIONAL_ASSESSMENT: PREVENTS OR INTERFERES SOME ACTIVE ACTIVITIES AND ADLS

## 2021-02-27 ASSESSMENT — PAIN DESCRIPTION - LOCATION
LOCATION: KNEE
LOCATION: KNEE

## 2021-02-27 ASSESSMENT — PAIN DESCRIPTION - DESCRIPTORS: DESCRIPTORS: ACHING;DISCOMFORT

## 2021-02-27 ASSESSMENT — PAIN DESCRIPTION - PROGRESSION: CLINICAL_PROGRESSION: GRADUALLY IMPROVING

## 2021-02-27 ASSESSMENT — PAIN DESCRIPTION - ORIENTATION: ORIENTATION: LEFT

## 2021-02-27 NOTE — PLAN OF CARE
Problem: Bowel/Gastric:  Goal: Control of bowel function will improve  Description: Control of bowel function will improve  Outcome: Met This Shift  Goal: Ability to achieve a regular elimination pattern will improve  Description: Ability to achieve a regular elimination pattern will improve  Outcome: Met This Shift     Problem: Nutritional:  Goal: Ability to follow a diet with enough fiber (20 to 30 grams) for normal bowel function will improve  Description: Ability to follow a diet with enough fiber (20 to 30 grams) for normal bowel function will improve  Outcome: Met This Shift  Goal: Adjustment of eating patterns to appropriate times will improve  Description: Adjustment of eating patterns to appropriate times will improve  Outcome: Met This Shift  Goal: Ability to make healthy dietary choices will improve  Description: Ability to make healthy dietary choices will improve  Outcome: Met This Shift  Goal: Progress toward achieving an optimal weight will improve  Description: Progress toward achieving an optimal weight will improve  Outcome: Met This Shift     Problem: Skin Integrity:  Goal: Risk for impaired skin integrity will decrease  Description: Risk for impaired skin integrity will decrease  Outcome: Met This Shift  Goal: Will show no infection signs and symptoms  Description: Will show no infection signs and symptoms  Outcome: Met This Shift  Goal: Absence of new skin breakdown  Description: Absence of new skin breakdown  Outcome: Met This Shift     Problem:  Activity:  Goal: Range of joint motion will improve  Description: Range of joint motion will improve  Outcome: Met This Shift  Goal: Ability to safely and independently change position in bed will improve  Description: Ability to safely and independently change position in bed will improve  Outcome: Met This Shift  Goal: Muscle strength will improve  Description: Muscle strength will improve  Outcome: Met This Shift  Goal: Amount of time patient self-catheterization  Description: Able to perform urinary self-catheterization  Outcome: Met This Shift  Goal: Ability to reestablish a normal urinary elimination pattern will improve - after catheter removal  Description: Ability to reestablish a normal urinary elimination pattern will improve - after catheter removal  Outcome: Met This Shift  Goal: Ability to recognize the need to void and respond appropriately will improve  Description: Ability to recognize the need to void and respond appropriately will improve  Outcome: Met This Shift  Goal: Absence of postvoid residual urine  Description: Absence of postvoid residual urine  Outcome: Met This Shift     Problem: Coping:  Goal: Ability to identify and develop effective coping behavior will improve  Description: Ability to identify and develop effective coping behavior will improve  Outcome: Met This Shift     Problem: Self-Concept:  Goal: Ability to verbalize positive feelings about self will improve  Description: Ability to verbalize positive feelings about self will improve  Outcome: Met This Shift     Problem: Musculor/Skeletal Functional Status  Goal: Highest potential functional level  Outcome: Met This Shift  Goal: Absence of falls  Outcome: Met This Shift

## 2021-02-27 NOTE — PROGRESS NOTES
PM&R Weekend Progress Note  Patient: Selina Casarez  Age/sex: 65 y.o. female  Medical Record #: [id number]  Date: 2/27/2021    Covering for: Dr. Concha Santiago: Patient seen and examined in her room this morning. No issues overnight. No complaints this morning. Denies abdominal pain, chest pain, shortness of breath. LBM 2/25. All pertinent labs reviewed.     Past Medical History:   Diagnosis Date    Anxiety     Basal cell carcinoma (BCC) of right shoulder     Chronic back pain     Closed avulsion fracture of distal fibula with delayed healing, left     Degenerative joint disease involving multiple joints     Diverticulitis     Hyperlipidemia     Hypertension     Essential hypertension    Liver disease     LLL pneumonia     Obesity     Osteoarthritis     Palpitations 9/17/2012    Sleep apnea     Tricuspid regurgitation     Trochanteric bursitis of right hip     Varicose veins of both lower extremities        Past Surgical History:   Procedure Laterality Date    BREAST SURGERY  1990    cyst lt breast    CARDIAC CATHETERIZATION  06/19/2019    dr Ashutosh Wright  5/2012    Brendan Polite REPLACEMENT  2001    L knee    JOINT REPLACEMENT  2001    rt knee    TOTAL KNEE ARTHROPLASTY Left 2/22/2021    LEFT KNEE TOTAL ARTHROPLASTY REVISION performed by Cass Guzmán MD at [facility] OR       Family History   Problem Relation Age of Onset    Lung Cancer Father     Thyroid Disease Sister        Objective:  Vitals:    02/26/21 2015 02/27/21 0447 02/27/21 0735 02/27/21 0745   BP: (!) 144/78  (!) 166/76 (!) 166/76   Pulse: 92 88 88 88   Resp: 16 16 16 16   Temp: 99.7 °F (37.6 °C) 98.4 °F (36.9 °C) 98.2 °F (36.8 °C) 98.2 °F (36.8 °C)   TempSrc:  Oral Oral Oral   SpO2: 97%  92% 92%   Weight:       Height:         02/26 0701 - 02/27 0700  In: 1140 [P.O.:1140]  Out: -     GEN: NAD, conversational   HEENT: NC/AT, PERRLA, EOMI  RESP: CTAB, no R/R/W CV: +S1 S2, RR   ABD: soft, NT, ND  EXT: Abnormal aROM, No clubbing/cyanosis, 2+ pulses   NEURO: Alert, no new focal deficits     Labs reviewed  CBC:   Recent Labs     02/25/21 0346   WBC 8.0   HGB 11.1*        BMP:    Recent Labs     02/25/21 0346      K 3.5      CO2 29   BUN 6*   CREATININE 0.7   GLUCOSE 101*     Hepatic:   Recent Labs     02/25/21 0346   AST 38*   ALT 27   BILITOT 1.1   ALKPHOS 122*     BNP: No results for input(s): BNP in the last 72 hours. Lipids: No results for input(s): CHOL, HDL in the last 72 hours. Invalid input(s): LDLCALCU  INR:   Recent Labs     02/25/21 0346   INR 1.3       A/P  This is 65 y.o. female with left TKA revision. Rehab: Continue extensive rehabilitation. Continue physical therapy and occupational therapy. Add Norvasc 5 mg daily. Add hydralazine PRN for SBP>180. Continue current management. Juan Rob DO, FAAPMR  Physical Medicine and Rehabilitation     Please note that the above documentation was prepared using voice recognition software. Every attempt was made to ensure accuracy but there may be spelling, grammatical, and contextual errors.

## 2021-02-27 NOTE — PROGRESS NOTES
on commode transfer safety and safety during toileting task, pt verbalized/demonstrated fair understanding, recommend continued education. [] Family teach completed on:    Pain Level: 6/10 L LE. Additional Notes:       Patient has made good  progress during treatment sessions toward set goals. Therapy emphasis to obtain goals:   Current Treatment Recommendations: Strengthening, Endurance Training, Patient/Caregiver Education & Training, Equipment Evaluation, Education, & procurement, Self-Care / ADL, Balance Training, Gait Training, Home Management Training, Functional Mobility Training, Safety Education & Training    Long term goal 1: Pt demo independent to eat all meals  Long term goal 2: Pt demo Mod I grooming seated or standing  Long term goal 3: Pt demo SBA to sponge bathe both seated & standing  Long term goal 4: Pt demo I UE & Mod I LE dressinculding underwear, pants, socks & shoes using AE as needed  Long term goal 5: Pt demo Mod I commode trf & Mod I toileting & use approrpaite DME to ensure pt safety  Long term goals 6: Pt demo SBA walk in shower trf using appropraite DME to ensurept safety  Long term goal 7: Pt demo Mod I light homemaking activity & demo G- safety & insight  Long term goal 8: Pt demo G- endurance for a 20-30 minute functional activity  Long term goal 9: Pt demo independent to complete a BUE strengtheing home exercise program    [x] Continue with current OT Plan of care.   [] Prepare for Discharge     DISCHARGE RECOMMENDATIONS  Recommended DME: LB AE    Post Discharge Care:   []Home Independently  []Home with 24hr Care / Supervision []Home with Partial Supervision []Home with Home Health OT []Home with Out Pt OT []Other: ___   Comments:         Time in Time out Tx Time Breakdown  Variance:   First Session  [id number] [x] Individual Tx-30 Mins  [] Concurrent Tx -  [] Co-Tx -   [] Group Tx -   [] Time Missed -     Second Session   [] Individual Tx-    [] Concurrent Tx -  [] Co-Tx -   [] Group Tx -   [] Time Missed -     Third Session    [] Individual Tx-   [] Concurrent Tx -  [] Co-Tx -   [] Group Tx -   [] Time Missed -         Total Tx Time  30 Mins     Alecia Giles BELTRÁN/L [address] [facility], [address] [place] [address], OTR/L [id number]

## 2021-02-28 PROCEDURE — 97530 THERAPEUTIC ACTIVITIES: CPT

## 2021-02-28 PROCEDURE — 6370000000 HC RX 637 (ALT 250 FOR IP): Performed by: STUDENT IN AN ORGANIZED HEALTH CARE EDUCATION/TRAINING PROGRAM

## 2021-02-28 PROCEDURE — 6370000000 HC RX 637 (ALT 250 FOR IP): Performed by: PHYSICAL MEDICINE & REHABILITATION

## 2021-02-28 PROCEDURE — 1280000000 HC REHAB R&B

## 2021-02-28 RX ADMIN — DOCUSATE SODIUM 50 MG AND SENNOSIDES 8.6 MG 1 TABLET: 8.6; 5 TABLET, FILM COATED ORAL at 20:39

## 2021-02-28 RX ADMIN — ACETAMINOPHEN 650 MG: 325 TABLET ORAL at 17:00

## 2021-02-28 RX ADMIN — Medication 40 MG: at 20:39

## 2021-02-28 RX ADMIN — ACETAMINOPHEN 650 MG: 325 TABLET ORAL at 23:19

## 2021-02-28 RX ADMIN — ASPIRIN 325 MG: 325 TABLET, COATED ORAL at 20:39

## 2021-02-28 RX ADMIN — OXYCODONE HYDROCHLORIDE 10 MG: 10 TABLET ORAL at 18:06

## 2021-02-28 RX ADMIN — OXYCODONE HYDROCHLORIDE 10 MG: 10 TABLET ORAL at 21:50

## 2021-02-28 RX ADMIN — OXYCODONE HYDROCHLORIDE 10 MG: 10 TABLET ORAL at 13:41

## 2021-02-28 RX ADMIN — AMLODIPINE BESYLATE 5 MG: 5 TABLET ORAL at 07:53

## 2021-02-28 RX ADMIN — ACETAMINOPHEN 650 MG: 325 TABLET ORAL at 05:54

## 2021-02-28 RX ADMIN — ACETAMINOPHEN 650 MG: 325 TABLET ORAL at 11:20

## 2021-02-28 RX ADMIN — ASPIRIN 325 MG: 325 TABLET, COATED ORAL at 07:54

## 2021-02-28 RX ADMIN — BUPROPION HYDROCHLORIDE 300 MG: 300 TABLET, FILM COATED, EXTENDED RELEASE ORAL at 07:54

## 2021-02-28 RX ADMIN — DOCUSATE SODIUM 50 MG AND SENNOSIDES 8.6 MG 1 TABLET: 8.6; 5 TABLET, FILM COATED ORAL at 07:54

## 2021-02-28 RX ADMIN — OXYCODONE HYDROCHLORIDE 10 MG: 10 TABLET ORAL at 07:54

## 2021-02-28 ASSESSMENT — PAIN DESCRIPTION - LOCATION
LOCATION: KNEE

## 2021-02-28 ASSESSMENT — PAIN SCALES - GENERAL
PAINLEVEL_OUTOF10: 4
PAINLEVEL_OUTOF10: 7
PAINLEVEL_OUTOF10: 7
PAINLEVEL_OUTOF10: 6
PAINLEVEL_OUTOF10: 0

## 2021-02-28 ASSESSMENT — PAIN DESCRIPTION - FREQUENCY
FREQUENCY: CONTINUOUS

## 2021-02-28 ASSESSMENT — PAIN DESCRIPTION - PAIN TYPE
TYPE: SURGICAL PAIN;ACUTE PAIN
TYPE: ACUTE PAIN

## 2021-02-28 ASSESSMENT — PAIN DESCRIPTION - PROGRESSION
CLINICAL_PROGRESSION: NOT CHANGED

## 2021-02-28 ASSESSMENT — PAIN - FUNCTIONAL ASSESSMENT
PAIN_FUNCTIONAL_ASSESSMENT: PREVENTS OR INTERFERES SOME ACTIVE ACTIVITIES AND ADLS

## 2021-02-28 ASSESSMENT — PAIN DESCRIPTION - DESCRIPTORS
DESCRIPTORS: ACHING;DISCOMFORT
DESCRIPTORS: ACHING;DISCOMFORT;CONSTANT
DESCRIPTORS: ACHING;CONSTANT;DISCOMFORT
DESCRIPTORS: ACHING;DISCOMFORT

## 2021-02-28 ASSESSMENT — PAIN DESCRIPTION - ONSET
ONSET: ON-GOING
ONSET: ON-GOING

## 2021-02-28 ASSESSMENT — PAIN SCALES - WONG BAKER: WONGBAKER_NUMERICALRESPONSE: 0

## 2021-02-28 ASSESSMENT — PAIN DESCRIPTION - ORIENTATION: ORIENTATION: LEFT

## 2021-02-28 NOTE — PROGRESS NOTES
Therapeutic Recreation Assessment     LEISURE INTEREST SURVEY               Key: P = Past Interest C = Current Interest F = Future Interest     Arts/Crafts:  ___Mechanical  ___Woodworking    ___ Painting   ___Floral arranging ___ Ceramics         _ __ Knitting                                                     ___ Crocheting        _C__Other: __Sewing_________      Cooking:  C_ _Baking _C__Cooking    ___   Other: _______   Comments:       Games:  ___Cards  ___Darts  _C__Board games    ___Word games  _C__Crossword puzzles    ___Seek-n-find/jumble                   _C__Other: __Ipad games_______      Horticulture:  ___Vegetables  ___Flowers  ___House plants                                                     ___Other: ___________      House/Yard Care:  _C__Ironing  _C__Laundry  _C__Cleaning                                                      ___Repairs              ___Lawn care                                                     ___Other: ___________      Music Listening/Playing:  ___Classical       ___Big Band       ___Rock-n-Roll                                                     C__Country          ___R&B             ___Gospel/Hymns                                                     ___Other: ___________      Outdoor Activities:  ___Hunting          ___Fishing          _P__Camping                                                     ___Other: ___________      Pets/Animals:  _P__Dogs             ___Cats                ___Fish                                                     ___Birds             ___Livestock         ___Other: _________    Reading:   ___Newspapers  ___Magazines ___Other:stories                                                     ___Other: ___________      Shinto Activities:  Mandaen: ___________   Jay Specter Activities: ___________      Shopping:   ___Grocery  ___Clothing  ___Flea market     _C__Other: _For the Grandkids__________      Socialization: _C__Family  _C__Friends  ___Neighbors ___Church  ___Volunteer ___Club/Organization                                                     ___Other: ___________    Sports/Exercises:  _C__Walking  ___Football  ___Basketball     ___Golf  ___Baseball  ___Tennis       ___Bowling  ___Other: ___________      Traveling:   ___Global  _C__Stateside  ___Local       ___Other: ___________      TV/Radio:   ___Mysteries  ___Comedies ___Romance                                                     ___Game show       ___Sports       ___News                                                      ___Talk show          _C__Other: __Hallmark_________      Other: Linda Go would like to be addressed as Dora . Personal Leisure Profile:   Question Response   Attributes Identify a positive quality: []Ambitious  []Appreciative  [x]Caring  []Courteous  []Considerate  []Compassionate  []Creative  []Dependable   []Generous  []Honest  []Hard working  []Helpful  []Kind  [][place]   [][place]  []Mannerly  []Patient  []Polite  []Respectful  []Sociable  []Sense of humor  []Thoughtful  []Other: ___________    You are very good at Being a Grandmother   Awareness Why do you participate in your leisure interest: []Competition  []Creativity  []Concentration  []Exercise  []Enjoyment  []Fun  []Hand/eye Coordination  []Increase confidence  []Learning a new skill  [x]Relaxation  []Social Interaction  []Supporting one another  []Thinking  []Other: ___________    Are your leisure activities limited at this time? [x]Yes  []No  Comments: _________    How often do you participate in your leisure interest? 1-2 x a week   Resources Name a restaurant, store or business you frequent? [address] When are you most happy?  With Family     Barriers to Leisure Participation:  []Visual   [][place]  []Cognition/Communication  []Motivation  []Financial  []Transportation  []Time Constraints  [x]Physical Ability  []Leisure Awareness  []Drug/Alcohol  []Other: ___________    Recommendations:  []Group Session [x]Individual Session  []Client Refused Services  []No Therapy  []Other: ___________    Objectives:  []Establish adaptations for leisure involvement   []Increase Self worth/self esteem  []Community reintegration training   [x]Social interaction  [x]Leisure participation  []Other: ___________    Goals for Therapeutic Recreation Services:   Social Interaction:   Admission Functional White Measure: _____6______  Discharge Functional White Measure: ______7_____   Other:________________________________      Leisure Choice/ Increase Sharee Quality of Life: To participate in 1 premorbid leisure activities of choice by discharge to increase leisure choice and independence thus increasing the overall quality of his/her life. Socialization/Social Interaction: To increase social interaction skills by introducing self 1 x per week at the beginning of TR session Socialization/Social Interaction: To initiate conversation 1 x per week during the TR session    Leisure Activity Tolerance: To increase leisure tolerance by attending 1 leisure activities per week for 20 minutes with active participation. Self Expression: To participate in 1 leisure activity(s) of choice, identifying 1 benefits to leisure participation. Melinda Santiago

## 2021-02-28 NOTE — PLAN OF CARE
Problem: Bowel/Gastric:  Goal: Control of bowel function will improve  Description: Control of bowel function will improve  Outcome: Met This Shift  Goal: Ability to achieve a regular elimination pattern will improve  Description: Ability to achieve a regular elimination pattern will improve  Outcome: Met This Shift     Problem: Nutritional:  Goal: Ability to follow a diet with enough fiber (20 to 30 grams) for normal bowel function will improve  Description: Ability to follow a diet with enough fiber (20 to 30 grams) for normal bowel function will improve  Outcome: Met This Shift  Goal: Adjustment of eating patterns to appropriate times will improve  Description: Adjustment of eating patterns to appropriate times will improve  Outcome: Met This Shift  Goal: Ability to make healthy dietary choices will improve  Description: Ability to make healthy dietary choices will improve  Outcome: Met This Shift  Goal: Progress toward achieving an optimal weight will improve  Description: Progress toward achieving an optimal weight will improve  Outcome: Met This Shift     Problem: Skin Integrity:  Goal: Risk for impaired skin integrity will decrease  Description: Risk for impaired skin integrity will decrease  Outcome: Met This Shift  Goal: Will show no infection signs and symptoms  Description: Will show no infection signs and symptoms  Outcome: Met This Shift  Goal: Absence of new skin breakdown  Description: Absence of new skin breakdown  Outcome: Met This Shift     Problem:  Activity:  Goal: Range of joint motion will improve  Description: Range of joint motion will improve  Outcome: Met This Shift  Goal: Ability to safely and independently change position in bed will improve  Description: Ability to safely and independently change position in bed will improve  Outcome: Met This Shift  Goal: Muscle strength will improve  Description: Muscle strength will improve  Outcome: Met This Shift  Goal: Amount of time patient spends in regular exercise will increase  Description: Amount of time patient spends in regular exercise will increase  Outcome: Met This Shift     Problem: Physical Regulation:  Goal: Ability to avoid complications of mobility impairment will improve  Description: Ability to avoid complications of mobility impairment will improve  Outcome: Met This Shift     Problem: Falls - Risk of:  Goal: Will remain free from falls  Description: Will remain free from falls  Outcome: Met This Shift  Goal: Absence of physical injury  Description: Absence of physical injury  Outcome: Met This Shift     Problem: Mobility - Impaired:  Goal: Mobility will improve  Description: Mobility will improve  Outcome: Met This Shift     Problem: Pain:  Goal: Pain level will decrease  Description: Pain level will decrease  Outcome: Met This Shift  Goal: Control of acute pain  Description: Control of acute pain  Outcome: Met This Shift  Goal: Control of chronic pain  Description: Control of chronic pain  Outcome: Met This Shift     Problem: SAFETY  Goal: LTG - patient will adhere to hip precautions during ADL's and transfers  Outcome: Met This Shift  Goal: LTG - Patient will demonstrate safety requirements appropriate to situation/environment  Outcome: Met This Shift  Goal: LTG - patient will utilize safety techniques  Outcome: Met This Shift  Goal: STG - patient locks brakes on wheelchair  Outcome: Met This Shift  Goal: STG - Patient uses call light consistently to request assistance with transfers  Outcome: Met This Shift  Goal: STG - patient uses gait belt during all transfers  Outcome: Met This Shift     Problem: Urinary Retention:  Goal: Urinary elimination within specified parameters  Description: Urinary elimination within specified parameters  Outcome: Met This Shift  Goal: Able to perform urinary catheter care  Description: Able to perform urinary catheter care  Outcome: Met This Shift  Goal: Able to perform urinary self-catheterization  Description: Able to perform urinary self-catheterization  Outcome: Met This Shift  Goal: Ability to reestablish a normal urinary elimination pattern will improve - after catheter removal  Description: Ability to reestablish a normal urinary elimination pattern will improve - after catheter removal  Outcome: Met This Shift  Goal: Ability to recognize the need to void and respond appropriately will improve  Description: Ability to recognize the need to void and respond appropriately will improve  Outcome: Met This Shift  Goal: Absence of postvoid residual urine  Description: Absence of postvoid residual urine  Outcome: Met This Shift     Problem: Coping:  Goal: Ability to identify and develop effective coping behavior will improve  Description: Ability to identify and develop effective coping behavior will improve  Outcome: Met This Shift     Problem: Self-Concept:  Goal: Ability to verbalize positive feelings about self will improve  Description: Ability to verbalize positive feelings about self will improve  Outcome: Met This Shift     Problem: Musculor/Skeletal Functional Status  Goal: Highest potential functional level  Outcome: Met This Shift  Goal: Absence of falls  Outcome: Met This Shift

## 2021-03-01 PROCEDURE — 97110 THERAPEUTIC EXERCISES: CPT

## 2021-03-01 PROCEDURE — 1280000000 HC REHAB R&B

## 2021-03-01 PROCEDURE — 97530 THERAPEUTIC ACTIVITIES: CPT

## 2021-03-01 PROCEDURE — 6370000000 HC RX 637 (ALT 250 FOR IP): Performed by: STUDENT IN AN ORGANIZED HEALTH CARE EDUCATION/TRAINING PROGRAM

## 2021-03-01 PROCEDURE — 6370000000 HC RX 637 (ALT 250 FOR IP): Performed by: PHYSICAL MEDICINE & REHABILITATION

## 2021-03-01 PROCEDURE — 97535 SELF CARE MNGMENT TRAINING: CPT

## 2021-03-01 RX ADMIN — AMLODIPINE BESYLATE 5 MG: 5 TABLET ORAL at 07:45

## 2021-03-01 RX ADMIN — ACETAMINOPHEN 650 MG: 325 TABLET ORAL at 11:28

## 2021-03-01 RX ADMIN — OXYCODONE HYDROCHLORIDE 10 MG: 10 TABLET ORAL at 07:47

## 2021-03-01 RX ADMIN — ACETAMINOPHEN 650 MG: 325 TABLET ORAL at 05:25

## 2021-03-01 RX ADMIN — BUPROPION HYDROCHLORIDE 300 MG: 300 TABLET, FILM COATED, EXTENDED RELEASE ORAL at 07:45

## 2021-03-01 RX ADMIN — OXYCODONE HYDROCHLORIDE 10 MG: 10 TABLET ORAL at 20:22

## 2021-03-01 RX ADMIN — DOCUSATE SODIUM 50 MG AND SENNOSIDES 8.6 MG 1 TABLET: 8.6; 5 TABLET, FILM COATED ORAL at 20:21

## 2021-03-01 RX ADMIN — Medication 40 MG: at 20:21

## 2021-03-01 RX ADMIN — ACETAMINOPHEN 650 MG: 325 TABLET ORAL at 23:00

## 2021-03-01 RX ADMIN — OXYCODONE HYDROCHLORIDE 10 MG: 10 TABLET ORAL at 12:15

## 2021-03-01 RX ADMIN — ASPIRIN 325 MG: 325 TABLET, COATED ORAL at 07:45

## 2021-03-01 RX ADMIN — OXYCODONE HYDROCHLORIDE 10 MG: 10 TABLET ORAL at 03:11

## 2021-03-01 RX ADMIN — DOCUSATE SODIUM 50 MG AND SENNOSIDES 8.6 MG 1 TABLET: 8.6; 5 TABLET, FILM COATED ORAL at 07:45

## 2021-03-01 RX ADMIN — OXYCODONE HYDROCHLORIDE 10 MG: 10 TABLET ORAL at 16:17

## 2021-03-01 RX ADMIN — ACETAMINOPHEN 650 MG: 325 TABLET ORAL at 16:17

## 2021-03-01 RX ADMIN — ASPIRIN 325 MG: 325 TABLET, COATED ORAL at 20:21

## 2021-03-01 ASSESSMENT — PAIN DESCRIPTION - LOCATION
LOCATION: KNEE

## 2021-03-01 ASSESSMENT — PAIN DESCRIPTION - ORIENTATION
ORIENTATION: LEFT

## 2021-03-01 ASSESSMENT — PAIN DESCRIPTION - ONSET
ONSET: ON-GOING

## 2021-03-01 ASSESSMENT — PAIN DESCRIPTION - DESCRIPTORS
DESCRIPTORS: ACHING;DISCOMFORT
DESCRIPTORS: ACHING;CONSTANT
DESCRIPTORS: ACHING;DISCOMFORT
DESCRIPTORS: ACHING;DISCOMFORT

## 2021-03-01 ASSESSMENT — PAIN DESCRIPTION - PAIN TYPE
TYPE: SURGICAL PAIN
TYPE: ACUTE PAIN;SURGICAL PAIN

## 2021-03-01 ASSESSMENT — PAIN DESCRIPTION - PROGRESSION
CLINICAL_PROGRESSION: NOT CHANGED

## 2021-03-01 ASSESSMENT — PAIN DESCRIPTION - FREQUENCY
FREQUENCY: CONTINUOUS

## 2021-03-01 ASSESSMENT — PAIN - FUNCTIONAL ASSESSMENT
PAIN_FUNCTIONAL_ASSESSMENT: PREVENTS OR INTERFERES SOME ACTIVE ACTIVITIES AND ADLS

## 2021-03-01 ASSESSMENT — PAIN SCALES - GENERAL
PAINLEVEL_OUTOF10: 6
PAINLEVEL_OUTOF10: 7
PAINLEVEL_OUTOF10: 5
PAINLEVEL_OUTOF10: 7
PAINLEVEL_OUTOF10: 6
PAINLEVEL_OUTOF10: 5
PAINLEVEL_OUTOF10: 5
PAINLEVEL_OUTOF10: 7

## 2021-03-01 NOTE — PROGRESS NOTES
Patient is POD7, ok to remove dressing. If there is drainage ok for dry dressing to be placed, 4x4/abd and ace. Sutures to be removed on POD14. Maintain hinged knee brace, keep barrier between incision and knee brace and PT protocol as prior. F/u in one week as outpatient or if pt. Still in rehab Will obtain imaging and f/u then.      Electronically signed by Kian Bey DO on 3/1/2021 at 10:19 AM

## 2021-03-01 NOTE — PROGRESS NOTES
Physical Therapy    Facility/Department: [facility] 5SE REHAB      NAME: Selina Casarez  : 1950  MRN: [id number]    Date of Service: 3/1/2021    Evaluating Therapist: Norma Cuellar DPT    ROOM: [address] [place] [address]  DIAGNOSIS: History of revision of total knee arthroplasty   PMH: Pt had elective surgery for revision of left total knee arthroplasty with explantation of previous femoral and tibial component and revision of both the femoral and entire tibial component on 21. PMH includes DJD, HLD, Essential HTN, OA, Anxiety, Chronic Back Pain, Liver Disease, Obesity, Tricuspid Regurgitation    PRECAUTIONS: TTWB LLE, L Hinged Knee Brace (0-30 AROM per VO (Dr. Adwoa Woodson 21)    Social:  Pt lives with spouse in a [facility] home with 3 stairs to enter with single rail (R HR). Equipment Owned: [employer] and [employer]. Pt ambulated with no device independently PTA. Initial Evaluation  21 A. M. PM Short Term Goals Long Term Goals    Was pt agreeable to Eval/treatment? Yes yes Yes      Does pt have pain?  L knee pain 6/10 Minimal L knee pain ( medicated prior to session Pain posterior aspect of knee      Bed Mobility  Rolling: SBA  Supine to sit: Rachel (LLE support)  Sit to supine: Rachel (LLE support)  Scooting: SBA NT Sit to supine SBA ( leg  )   Supine to sit min A  Sup Mod I   Transfers Sit to stand: Rachel  Stand to sit: cgA  Stand pivot: cgA/Rachel with bariatric ww Sit to stand CGA  Stand to sit CGA  Stand pivot CGA with bariatric ww Sit to stand CGA  Stand to sit CGA  Stand pivot with bariatric ww  Sup with AAD Mod I with AAD   Ambulation   15 feet with bariatric ww with cgA/Rachel 25 feet x2 and 10 feet x2  bariatric ww CG/SBA 15 feet x2 with bariatric ww with SBA 50 feet with AAD with Sup 150 feet with AAD with Mod I   Walking 10 feet on uneven surface NT N/T NT     Wheel Chair Mobility 15 feet with B UE and SBA NT NT >150 feet with B UE with Sup  >300 feet with B UE with Mod I   Car Transfers NT N/T NT Sup with AAD Mod I with AAD   Stair negotiation: ascended and descended                     NT N/T NT 2 steps with B rail with Sup 4 steps with 1 rail and AAD with Mod I   Curb Step:   ascended and descended 2 inch step with // bars and cg/Rachel (ascending BW) N/T NT 4 inch step with AAD and Sup 4 inch step with AAD and Mod I   Picking up object off the floor NT       BLE ROM WFL       BLE Strength R LE 4/5  L LE: grossly  3-/5 (hip & knee),   3+/5 (ankle) (limited by pain)       Balance  Sitting: Independent  Standing: cgA with ww Standing CGA      Date Family Teach Completed TBD       Is additional Family Teaching Needed? Y or N Y       Hindering Progress L LE weakness, Balance, TTWB, Stairs Balance weakness      PT recommended ELOS 2 weeks       Team's Discharge Plan        Therapist at Team Meeting            Pt performed therapeutic exercise of the following:   AM  R LE only  4# weights ( seated exercises )   LAQs ( 2x15)   marhcing ( 2x15)     PM  B LE ( ankle pumps x30, glut/quad sets x30 )   R LE ( hip abduction 2x15, heel slides 2x15)   L LE ( hip abduction 2x15, AAROM SAQs x10)       Patient education  Pt was educated on technique with stair negotiation     Patient response to education:   Pt verbalized understanding Pt demonstrated skill Pt requires further education in this area   x X with verbal cues /assistance  x     Additional Comments: PT completed functional mobility as noted above. Much time spent on educating pt on technique with car transfer and stair negotiation. Pt reported that ramp maybe installed and second rail on entry step with also be installed . Will discussed with daughter the specifics of entering home. Will continue to increase pt's tolerance to activity. Transport arrived at end of session to take pt to xray. Time in: 1000  Time out: 1045    Time in 1345  Time out 1430    Pt is making  progress toward established Physical Therapy goals.   Continue with physical therapy current plan of [facility].    [address] Number: [id number]

## 2021-03-01 NOTE — PROGRESS NOTES
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 Oral Care 3/1/21 Mod I seated Completed seated at sink. Bathing 3/1/21 Min/CGA  Sponge bath completed seated/standing with assist required to wash L foot and for dynamic standing balance/safety d/t unsteadiness. Min cues for safety and adaptive techniques provided. UB Dressing 3/1/21 Setup  To don/doff pull over shirt and to don bra seated in chair. LB Dressing 3/1/21 CGA LB AE To don/doff pants. Pt able to thread B LE's into pants utilizing reacher, required CGA for dynamic balance/safety while standing to manage clothing over hips d/t unsteadiness. Min cues for adaptive techniques provided. Footwear 3/1/21 Min A LB AE To don/doff slip on shoes. Pt required assist to initiate donning L shoe seated at EOB. Toileting 3/1/21 CGA  For dynamic balance/safety while standing and completing personal hygiene and clothing management d/t unsteadiness. Homemaking 21 Min A W/c level   reacher      Functional Transfers / Balance:   Date Status DME  Comments   Sit Balance 3/1/21 Mod I  Seated at EOB and unsupported sitting in w/c during dynamic ADL tasks. Stand Balance 3/1/21 CGA Grab bar Demo'd during transfers and ADL tasks. [] Tub  [] Shower   Transfer  TBA     Commode   Transfer 3/1/21 CGA Grab bar,  Bariatric 3-in-1 SPT from w/c<>3-in-1 commode with assist for balance/safety. Min cues for safety required. Fair+ ability to maintain precautions.    Functional   Mobility 21 CGA/Min A W/c propulsion    Other:  Sit<>Stand:    3/1/21   CGA/SBA    Min cues for safety required. Functional Exercises / Activity:  -Pt completed therapeutic leisure table top activity with 1# wrist weights donned with focus on increasing B UE strength and overall endurance for functional activities. Fair tolerance. -2# weighted ball exercises completed 3x10 reps with focus on increasing B UE strength and overall endurance for functional transfers/ADL tasks. Fair tolerance. Sensory / Neuromuscular Re-Education:      Cognitive Skills:   Status Comments   Problem   Solving good     Memory good     Sequencing fair +    Safety fair       Visual Perception:    Education:  -Pt educated on adaptive techniques for ADL tasks and LB AE pt verbalized/demonstrated fair understanding, recommend continued education. [] Family teach completed on:    Pain Level:4/10 L LE. Additional Notes:       Patient has made good  progress during treatment sessions toward set goals.  Therapy emphasis to obtain goals:   Current Treatment Recommendations: Strengthening, Endurance Training, Patient/Caregiver Education & Training, Equipment Evaluation, Education, & procurement, Self-Care / ADL, Balance Training, Gait Training, Home Management Training, Functional Mobility Training, Safety Education & Training    Long term goal 1: Pt demo independent to eat all meals  Long term goal 2: Pt demo Mod I grooming seated or standing  Long term goal 3: Pt demo SBA to sponge bathe both seated & standing  Long term goal 4: Pt demo I UE & Mod I LE dressinculding underwear, pants, socks & shoes using AE as needed  Long term goal 5: Pt demo Mod I commode trf & Mod I toileting & use approrpaite DME to ensure pt safety  Long term goals 6: Pt demo SBA walk in shower trf using appropraite DME to ensurept safety  Long term goal 7: Pt demo Mod I light homemaking activity & demo G- safety & insight  Long term goal 8: Pt demo G- endurance for a 20-30 minute functional activity  Long term goal 9: Pt demo independent to complete a BUE

## 2021-03-02 PROCEDURE — 97110 THERAPEUTIC EXERCISES: CPT

## 2021-03-02 PROCEDURE — 1280000000 HC REHAB R&B

## 2021-03-02 PROCEDURE — 97530 THERAPEUTIC ACTIVITIES: CPT

## 2021-03-02 PROCEDURE — 6370000000 HC RX 637 (ALT 250 FOR IP): Performed by: PHYSICAL MEDICINE & REHABILITATION

## 2021-03-02 PROCEDURE — 97535 SELF CARE MNGMENT TRAINING: CPT

## 2021-03-02 PROCEDURE — 6370000000 HC RX 637 (ALT 250 FOR IP): Performed by: STUDENT IN AN ORGANIZED HEALTH CARE EDUCATION/TRAINING PROGRAM

## 2021-03-02 RX ADMIN — OXYCODONE HYDROCHLORIDE 10 MG: 10 TABLET ORAL at 12:41

## 2021-03-02 RX ADMIN — Medication 40 MG: at 21:23

## 2021-03-02 RX ADMIN — AMLODIPINE BESYLATE 5 MG: 5 TABLET ORAL at 08:13

## 2021-03-02 RX ADMIN — ASPIRIN 325 MG: 325 TABLET, COATED ORAL at 21:23

## 2021-03-02 RX ADMIN — ACETAMINOPHEN 650 MG: 325 TABLET ORAL at 16:21

## 2021-03-02 RX ADMIN — BUPROPION HYDROCHLORIDE 300 MG: 300 TABLET, FILM COATED, EXTENDED RELEASE ORAL at 08:14

## 2021-03-02 RX ADMIN — OXYCODONE HYDROCHLORIDE 10 MG: 10 TABLET ORAL at 03:21

## 2021-03-02 RX ADMIN — DOCUSATE SODIUM 50 MG AND SENNOSIDES 8.6 MG 1 TABLET: 8.6; 5 TABLET, FILM COATED ORAL at 22:38

## 2021-03-02 RX ADMIN — OXYCODONE HYDROCHLORIDE 10 MG: 10 TABLET ORAL at 08:13

## 2021-03-02 RX ADMIN — ACETAMINOPHEN 650 MG: 325 TABLET ORAL at 05:28

## 2021-03-02 RX ADMIN — ACETAMINOPHEN 650 MG: 325 TABLET ORAL at 10:57

## 2021-03-02 RX ADMIN — ASPIRIN 325 MG: 325 TABLET, COATED ORAL at 08:13

## 2021-03-02 RX ADMIN — ACETAMINOPHEN 650 MG: 325 TABLET ORAL at 22:39

## 2021-03-02 RX ADMIN — OXYCODONE HYDROCHLORIDE 10 MG: 10 TABLET ORAL at 22:39

## 2021-03-02 RX ADMIN — DOCUSATE SODIUM 50 MG AND SENNOSIDES 8.6 MG 1 TABLET: 8.6; 5 TABLET, FILM COATED ORAL at 08:13

## 2021-03-02 RX ADMIN — OXYCODONE HYDROCHLORIDE 10 MG: 10 TABLET ORAL at 18:13

## 2021-03-02 RX ADMIN — LACTULOSE 20 G: 20 SOLUTION ORAL at 18:12

## 2021-03-02 ASSESSMENT — PAIN DESCRIPTION - ONSET
ONSET: ON-GOING

## 2021-03-02 ASSESSMENT — PAIN DESCRIPTION - ORIENTATION
ORIENTATION: LEFT

## 2021-03-02 ASSESSMENT — PAIN DESCRIPTION - FREQUENCY
FREQUENCY: CONTINUOUS

## 2021-03-02 ASSESSMENT — PAIN DESCRIPTION - DESCRIPTORS
DESCRIPTORS: ACHING;CONSTANT;DISCOMFORT
DESCRIPTORS: ACHING;CONSTANT
DESCRIPTORS: ACHING;CONSTANT;DISCOMFORT

## 2021-03-02 ASSESSMENT — PAIN SCALES - GENERAL
PAINLEVEL_OUTOF10: 0
PAINLEVEL_OUTOF10: 3
PAINLEVEL_OUTOF10: 6
PAINLEVEL_OUTOF10: 7
PAINLEVEL_OUTOF10: 4
PAINLEVEL_OUTOF10: 5
PAINLEVEL_OUTOF10: 7
PAINLEVEL_OUTOF10: 5
PAINLEVEL_OUTOF10: 7

## 2021-03-02 ASSESSMENT — PAIN DESCRIPTION - PROGRESSION
CLINICAL_PROGRESSION: NOT CHANGED

## 2021-03-02 ASSESSMENT — PAIN DESCRIPTION - PAIN TYPE
TYPE: ACUTE PAIN;SURGICAL PAIN

## 2021-03-02 ASSESSMENT — PAIN DESCRIPTION - LOCATION
LOCATION: KNEE

## 2021-03-02 ASSESSMENT — PAIN SCALES - WONG BAKER: WONGBAKER_NUMERICALRESPONSE: 0

## 2021-03-02 NOTE — PROGRESS NOTES
Physical Therapy    Facility/Department: [address] [place] [address] 5S REHAB      NAME: Prieto Johnston  : 1950  MRN: [id number]    Date of Service: 3/2/2021    Evaluating Therapist: Renan Clancy DPT    ROOM: [id number]-X  DIAGNOSIS: History of revision of total knee arthroplasty   PMH: Pt had elective surgery for revision of left total knee arthroplasty with explantation of previous femoral and tibial component and revision of both the femoral and entire tibial component on 21. PMH includes DJD, HLD, Essential HTN, OA, Anxiety, Chronic Back Pain, Liver Disease, Obesity, Tricuspid Regurgitation    PRECAUTIONS: TTWB LLE, L Hinged Knee Brace (0-30 AROM per VO (Dr. Emelyn Alex 21)    Social:  Pt lives with spouse in a [facility] home with 3 stairs to enter with single rail (R HR). Equipment Owned: [employer] and [employer]. Pt ambulated with no device independently PTA. Initial Evaluation  21 A. M. PM Short Term Goals Long Term Goals    Was pt agreeable to Eval/treatment? Yes yes Yes      Does pt have pain?  L knee pain 6/10  L knee pain ( posterior aspect )       Bed Mobility  Rolling: SBA  Supine to sit: Rachel (LLE support)  Sit to supine: Rachel (LLE support)  Scooting: SBA Sit to supine min A   Supine to sit min A   Assistance for L LE management  Using a sheet for L LE management   Supine to sit SBA  Sit to supine SBA   Sup Mod I   Transfers Sit to stand: Rachel  Stand to sit: cgA  Stand pivot: cgA/Rachel with bariatric ww Sit to stand SBA  Stand to sit CG/SBA  Stand pivot CG/SBA with bariatric ww Sit to stand SBA  Stand to sit SBA  Stand pivot with bariatric ww with CG/SBA Sup with AAD Mod I with AAD   Ambulation   15 feet with bariatric ww with cgA/Rachel 25 feet x2 and 35 feet  bariatric ww SBA 15 feet x2 with bariatric ww with SBA 50 feet with AAD with Sup 150 feet with AAD with Mod I   Walking 10 feet on uneven surface NT N/T NT     Wheel Chair Mobility 15 feet with B UE and SBA NT NT >150 feet with B UE with Sup  >300 feet with B UE with Mod I   Car Transfers NT N/T NT Sup with AAD Mod I with AAD   Stair negotiation: ascended and descended                     NT N/T NT 2 steps with B rail with Sup 4 steps with 1 rail and AAD with Mod I   Curb Step:   ascended and descended 2 inch step with // bars and cg/Rachel (ascending BW) N/T NT 4 inch step with AAD and Sup 4 inch step with AAD and Mod I   Picking up object off the floor NT       BLE ROM WFL       BLE Strength R LE 4/5  L LE: grossly  3-/5 (hip & knee),   3+/5 (ankle) (limited by pain)       Balance  Sitting: Independent  Standing: cgA with ww Standing CGA      Date Family Teach Completed TBD       Is additional Family Teaching Needed? Y or N Y       Hindering Progress L LE weakness, Balance, TTWB, Stairs Balance weakness      PT recommended ELOS 2 weeks       Team's Discharge Plan        Therapist at Team Meeting            Pt performed therapeutic exercise of the following:    PM  B LE ( ankle pumps x30, glut/quad sets x30 )   R LE ( hip abduction 2x15, heel slides 2x15)   L LE ( hip abduction 2x15, AAROM SAQs x10)       Patient education  Pt was educated on technique with stair negotiation     Patient response to education:   Pt verbalized understanding Pt demonstrated skill Pt requires further education in this area   x X with verbal cues /assistance  x     Additional Comments: Pt completed functional mobility as noted above. Adjusted L knee hinged brace for appropriate fit . Educated pt on proper alignment and fit. Pt with good understanding of education. Much time discussing about whether pt is going to have a ramp entrance vs negotiating steps with bilateral rails. Discussed possible techniques with car transfer. Will address all concerns with daughter on Friday during training. PM, Pt completed functional mobility as noted above. Pt making consistent progress toward goals. Continue to emphasize increasing endurance.   Will continue to work on stair negotiation until formal plans for a ramp are confirmed. Time in: 1000  Time out: 1045    Time in 1345  Time out 1430    Pt is making  progress toward established Physical Therapy goals. Continue with physical therapy current plan of care.     [address] Number: [id number]

## 2021-03-02 NOTE — PROGRESS NOTES
OCCUPATIONAL THERAPY DAILY NOTE    Date:3/2/2021  Patient Name: Jason Batres  MRN: [id number]  : 1950  Room: [address] [place], [place] [address]     Diagnosis: LTKR- aseptic infection & loosening of component    Precautions: Fall risk, L LE TTWB & [place] brace 0-30*    Functional Assessment:   Date Status AE  Comments   Feeding 3/1/21 Mod I     Grooming 3/1/21 Mod I seated          Oral Care 3/1/21 Mod I seated    Bathing 3/1/21 Min/CGA     UB Dressing 3/1/21 Setup     LB Dressing 3/1/21 CGA LB AE    Footwear 3/1/21 Min A LB AE    Toileting 3/1/21 CGA     Homemaking 3/2/21 Min A W/c level   reacher Pt completed simple laundry folding activity seated at table with focus on increasing B UE strength. Pt reports she enjoys completing laundry and plans to continue this activity upon discharge. Functional Transfers / Balance:   Date Status DME  Comments   Sit Balance 3/2/21 Mod I     Stand Balance 3/1/21 CGA Grab bar    [] Tub  [] Shower   Transfer  TBA     Commode   Transfer 3/1/21 CGA Grab bar,  Bariatric 3-in-1    Functional   Mobility 3/2/21 Min A W/c propulsion Assist required to maneuver in smaller spaces and around environmental obstacles when completing functional mobility in OT clinic. Other:  Sit<>Stand:    3/1/21   CGA/SBA       Functional Exercises / Activity:  -UBE completed seated 2x5 minutes with focus on increasing B UE strength and overall endurance for functional activities. Fair+ tolerance.   -Shoulder arc completed seated at table with 1# wrist weights donned with focus on increasing B UE strength and overall endurance for functional transfers/ADL tasks. Fair tolerance. -Yellow Candobar completed x30 in all planes to increase B UE strength and overall endurance. Fair+ tolerance. -2# dumb bell exercises completed 2x15 reps with L and R UE to increase strength and overall endurance for functional transfers/ADL tasks. Fair+ tolerance.      Sensory / Neuromuscular Re-Education:      Cognitive Skills: Status Comments   Problem   Solving good     Memory good     Sequencing fair +    Safety fair       Visual Perception:    Education:  -Pt educated on w/c maneuverability techniques. pt verbalized/demonstrated fair understanding, recommend continued education. [] Family teach completed on:    Pain Level:4/10 L LE. Additional Notes:       Patient has made good  progress during treatment sessions toward set goals. Therapy emphasis to obtain goals:   Current Treatment Recommendations: Strengthening, Endurance Training, Patient/Caregiver Education & Training, Equipment Evaluation, Education, & procurement, Self-Care / ADL, Balance Training, Gait Training, Home Management Training, Functional Mobility Training, Safety Education & Training    Long term goal 1: Pt demo independent to eat all meals  Long term goal 2: Pt demo Mod I grooming seated or standing  Long term goal 3: Pt demo SBA to sponge bathe both seated & standing  Long term goal 4: Pt demo I UE & Mod I LE dressinculding underwear, pants, socks & shoes using AE as needed  Long term goal 5: Pt demo Mod I commode trf & Mod I toileting & use approrpaite DME to ensure pt safety  Long term goals 6: Pt demo SBA walk in shower trf using appropraite DME to ensurept safety  Long term goal 7: Pt demo Mod I light homemaking activity & demo G- safety & insight  Long term goal 8: Pt demo G- endurance for a 20-30 minute functional activity  Long term goal 9: Pt demo independent to complete a BUE strengtheing home exercise program    [x] Continue with current OT Plan of care.   [] Prepare for Discharge     DISCHARGE RECOMMENDATIONS  Recommended DME: LB AE, bariatric 3-in-1 commode    Post Discharge Care:   []Home Independently  []Home with 24hr Care / Supervision [x]Home with Partial Supervision []Home with Home Health OT []Home with Out Pt OT []Other: ___   Comments:         Time in Time out Tx Time Breakdown  Variance:   First Session  [phone] [x] Individual Tx-45

## 2021-03-02 NOTE — PROGRESS NOTES
03/01/21 1909   Attendance   Activity Puzzles  (Crossword)   Participation Active participation   Therapeutic Recreation   Community Reintegration Demonstrates ability to complete social goals   Social Skills Interacts independently in social activity   Leisure Education Demonstrates knowledge of benefits of leisure involvement  (Dora identified fun,thinking & exercise as benefits .)   Time Spent With Patient   Minutes 35

## 2021-03-02 NOTE — PROGRESS NOTES
with AAD Mod I with AAD   Stair negotiation: ascended and descended                     NT N/T NT 2 steps with B rail with Sup 4 steps with 1 rail and AAD with Mod I   Curb Step:   ascended and descended 2 inch step with // bars and cg/Rachel (ascending BW) N/T NT 4 inch step with AAD and Sup 4 inch step with AAD and Mod I   Picking up object off the floor NT           BLE ROM WFL           BLE Strength R LE 4/5  L LE: grossly  3-/5 (hip & knee),   3+/5 (ankle) (limited by pain)           Balance  Sitting: Independent  Standing: cgA with ww Standing CGA               Scheduled Meds:   amLODIPine  5 mg Oral Daily    simvastatin  40 mg Oral Nightly    acetaminophen  650 mg Oral Q6H    aspirin  325 mg Oral BID    sennosides-docusate sodium  1 tablet Oral BID    sodium chloride flush  10 mL Intravenous 2 times per day    buPROPion  300 mg Oral QAM     Continuous Infusions:  PRN Meds:hydrALAZINE, lactulose, magnesium hydroxide, ondansetron **OR** [DISCONTINUED] ondansetron, oxyCODONE **OR** oxyCODONE, sodium chloride flush, polyethylene glycol  I/O last 3 completed shifts: In: 780 [P.O.:780]  Out: -   I/O this shift: In: 480 [P.O.:480]  Out: -     Labs reviewed  CBC: No results for input(s): WBC, HGB, PLT in the last 72 hours. BMP:  No results for input(s): NA, K, CL, CO2, BUN, CREATININE, GLUCOSE in the last 72 hours. Hepatic: No results for input(s): AST, ALT, ALB, BILITOT, ALKPHOS in the last 72 hours. BNP: No results for input(s): BNP in the last 72 hours. Lipids: No results for input(s): CHOL, HDL in the last 72 hours. Invalid input(s): LDLCALCU  INR: No results for input(s): INR in the last 72 hours.     Assessment/  Patient Active Problem List:     Abnormal nuclear stress test     Essential hypertension     Primary osteoarthritis of left knee     Hyperlipidemia     Fatty liver     Anxiety     Vitamin D deficiency     Class 3 severe obesity due to excess calories with serious comorbidity and body mass index (BMI) of 40.0 to 44.9 in adult [facility])     EKG abnormalities     Carotid stenosis, left     Pre-diabetes     Varicose veins of both lower extremities with pain     Chronic right-sided low back pain without sciatica     COVID-19     History of revision of total knee arthroplasty      Plan/  1. Continue rehab program  2. Reminded her we changed the Lactulose to PRN  3. Continue pain control  4. Maintain precautions per ortho  5. Will have Dr. Armenta Found re-evaluate and direct dressing change  6.  Continue dvt prophylaxis          Dorota White MD

## 2021-03-03 PROCEDURE — 97530 THERAPEUTIC ACTIVITIES: CPT

## 2021-03-03 PROCEDURE — 97110 THERAPEUTIC EXERCISES: CPT

## 2021-03-03 PROCEDURE — 97535 SELF CARE MNGMENT TRAINING: CPT

## 2021-03-03 PROCEDURE — 6370000000 HC RX 637 (ALT 250 FOR IP): Performed by: STUDENT IN AN ORGANIZED HEALTH CARE EDUCATION/TRAINING PROGRAM

## 2021-03-03 PROCEDURE — 1280000000 HC REHAB R&B

## 2021-03-03 PROCEDURE — 6370000000 HC RX 637 (ALT 250 FOR IP): Performed by: PHYSICAL MEDICINE & REHABILITATION

## 2021-03-03 RX ADMIN — ACETAMINOPHEN 650 MG: 325 TABLET ORAL at 06:08

## 2021-03-03 RX ADMIN — ACETAMINOPHEN 650 MG: 325 TABLET ORAL at 16:43

## 2021-03-03 RX ADMIN — OXYCODONE HYDROCHLORIDE 10 MG: 10 TABLET ORAL at 16:46

## 2021-03-03 RX ADMIN — BUPROPION HYDROCHLORIDE 300 MG: 300 TABLET, FILM COATED, EXTENDED RELEASE ORAL at 07:18

## 2021-03-03 RX ADMIN — AMLODIPINE BESYLATE 5 MG: 5 TABLET ORAL at 07:18

## 2021-03-03 RX ADMIN — DOCUSATE SODIUM 50 MG AND SENNOSIDES 8.6 MG 1 TABLET: 8.6; 5 TABLET, FILM COATED ORAL at 07:18

## 2021-03-03 RX ADMIN — ASPIRIN 325 MG: 325 TABLET, COATED ORAL at 23:42

## 2021-03-03 RX ADMIN — OXYCODONE HYDROCHLORIDE 10 MG: 10 TABLET ORAL at 07:18

## 2021-03-03 RX ADMIN — Medication 40 MG: at 23:45

## 2021-03-03 RX ADMIN — ASPIRIN 325 MG: 325 TABLET, COATED ORAL at 07:18

## 2021-03-03 RX ADMIN — OXYCODONE HYDROCHLORIDE 10 MG: 10 TABLET ORAL at 23:42

## 2021-03-03 RX ADMIN — DOCUSATE SODIUM 50 MG AND SENNOSIDES 8.6 MG 1 TABLET: 8.6; 5 TABLET, FILM COATED ORAL at 23:42

## 2021-03-03 RX ADMIN — ACETAMINOPHEN 650 MG: 325 TABLET ORAL at 11:17

## 2021-03-03 ASSESSMENT — PAIN SCALES - GENERAL
PAINLEVEL_OUTOF10: 5
PAINLEVEL_OUTOF10: 7

## 2021-03-03 ASSESSMENT — PAIN DESCRIPTION - PROGRESSION
CLINICAL_PROGRESSION: NOT CHANGED
CLINICAL_PROGRESSION: NOT CHANGED

## 2021-03-03 ASSESSMENT — PAIN - FUNCTIONAL ASSESSMENT: PAIN_FUNCTIONAL_ASSESSMENT: PREVENTS OR INTERFERES SOME ACTIVE ACTIVITIES AND ADLS

## 2021-03-03 ASSESSMENT — PAIN DESCRIPTION - ORIENTATION: ORIENTATION: LEFT

## 2021-03-03 ASSESSMENT — PAIN SCALES - WONG BAKER: WONGBAKER_NUMERICALRESPONSE: 0

## 2021-03-03 ASSESSMENT — PAIN DESCRIPTION - DESCRIPTORS: DESCRIPTORS: ACHING;DISCOMFORT

## 2021-03-03 ASSESSMENT — PAIN DESCRIPTION - FREQUENCY: FREQUENCY: CONTINUOUS

## 2021-03-03 NOTE — PROGRESS NOTES
OCCUPATIONAL THERAPY DAILY NOTE    Date:3/3/2021  Patient Name: Carlyle Frederick  MRN: [id number]  : 1950  Room: [address] [place], [place] [address]     Diagnosis: LTKR- aseptic infection & loosening of component    Precautions: Fall risk, L LE TTWB & [place] brace 0-30*    Functional Assessment:   Date Status AE  Comments   Feeding 3/3/21 Independent     Grooming 3/3/21 Mod I seated Washing/drying & styling/curling hair completed seated at sink. Oral Care 3/3/21 Mod I seated Seated at sink. Bathing 3/3/21 SBA  Sponge bath completed seated/standig at sink with SBA required for dynamic standing balance/safety d/t slight unsteadiness while standing to wash. Fair+ ability to maintain TTWB L LE.   UB Dressing 3/3/21 Independent  To don/doff pull over shirt and don bra seated in chair. LB Dressing 3/3/21 SBA/S LB AE To don pants utilizing reacher. Min cues for technique provided. Pt required SBA for dynamic balance/safety while standing to manage clothing over hips. Fair+ ability to maintain precautions. Footwear 3/3/21 Supervision LB AE To don/doff slip on shoes seated at EOB. Pt defers socks this date and reports she typically doesn't wear socks with her slip on shoes. Pt verbalizes understanding of technique to utilize sockaid. Toileting 3/3/21 SBA  Pt able to complete personal hygiene and clothing management with assist required for dynamic standing balance/safety while standing to manage clothing over hips d/t unsteadiness. Homemaking 3/2/21 Min A W/c level   reacher      Functional Transfers / Balance:   Date Status DME  Comments   Sit Balance 3/3/21 Mod I     Stand Balance 3/3/21 SBA Grab bar Demo'd during transfers and ADL tasks. [] Tub  [] Shower   Transfer  TBA  Pt deferred until 3/4 d/t uncertain of plan to shower upon discharge. Commode   Transfer 3/3/21 SBA Grab bar,  Bariatric 3-in-1 SPT from w/c<>3-in-1 commode with SBA for dynamic balance/safety d/t unsteadiness.     Functional   Mobility 3/3/21 Supervision W/c propulsion Short distances with min cues for technique to maneuver w/c. Other:  Sit<>Stand:    3/3/21   SBA/S    For safety. Functional Exercises / Activity:  -UBE completed seated 2x5 minutes with focus on increasing B UE strength and overall endurance for functional activities. Fair+ tolerance. -1#  completed x15 to increase B UE strength and endurance for transfers and ADL tasks. Sensory / Neuromuscular Re-Education:      Cognitive Skills:   Status Comments   Problem   Solving good     Memory good     Sequencing fair +    Safety fair       Visual Perception:    Education:  -Pt educated on adaptive techniques for ADL tasks. Pt verbalized/demonstrated good understanding, recommend continued education. [] Family teach completed on:    Pain Level:4/10 L LE. Additional Notes:  3/3/21: Pt issued LB AE this date for increased independence with ADL tasks. Patient has made good  progress during treatment sessions toward set goals.  Therapy emphasis to obtain goals:   Current Treatment Recommendations: Strengthening, Endurance Training, Patient/Caregiver Education & Training, Equipment Evaluation, Education, & procurement, Self-Care / ADL, Balance Training, Gait Training, Home Management Training, Functional Mobility Training, Safety Education & Training    Long term goal 1: Pt demo independent to eat all meals  Long term goal 2: Pt demo Mod I grooming seated or standing  Long term goal 3: Pt demo SBA to sponge bathe both seated & standing  Long term goal 4: Pt demo I UE & Mod I LE dressinculding underwear, pants, socks & shoes using AE as needed  Long term goal 5: Pt demo Mod I commode trf & Mod I toileting & use approrpaite DME to ensure pt safety  Long term goals 6: Pt demo SBA walk in shower trf using appropraite DME to ensurept safety  Long term goal 7: Pt demo Mod I light homemaking activity & demo G- safety & insight  Long term goal 8: Pt demo G- endurance for

## 2021-03-03 NOTE — PROGRESS NOTES
Mod I with AAD   Stair negotiation: ascended and descended                     NT N/T See Comments 2 steps with B rail with Sup 4 steps with 1 rail and AAD with Mod I   Curb Step:   ascended and descended 2 inch step with // bars and cg/Rachel (ascending BW) See comments  4 in curb step in // bars sba    6 in curb step in // bars cg/sba  4 inch step with AAD and Sup 4 inch step with AAD and Mod I   Picking up object off the floor NT       BLE ROM WFL       BLE Strength R LE 4/5  L LE: grossly  3-/5 (hip & knee),   3+/5 (ankle) (limited by pain)       Balance  Sitting: Independent  Standing: cgA with ww Standing CGA      Date Family Teach Completed TBD       Is additional Family Teaching Needed? Y or N Y       Hindering Progress L LE weakness, Balance, TTWB, Stairs Balance weakness      PT recommended ELOS 2 weeks       Team's Discharge Plan        Therapist at Team Meeting            Pt performed therapeutic exercise of the following:  AM  Step ups using 6 inch step in parallel bars ( ascending backwards , descending forward) 2x5 reps     PM: 5x STS transfer at w/c, no cues for hand placement   Standing at ww: hip flexion/abduction/extension x10 reps L LE only         Patient education  Pt was educated on technique with stair negotiation     Patient response to education:   Pt verbalized understanding Pt demonstrated skill Pt requires further education in this area   x X with verbal cues /assistance  x     Additional Comments: Pt completed functional mobility as noted above. Adjusted L knee hinged brace for appropriate fit . Educated pt on proper alignment and fit. Pt with good understanding of education. Instruction on alternative technique for stair negotiation. Pt reported having 2 platform steps to enter home through front door. Instructed pt to back up to 7.5 inch step ( with chair placed on step ) and had pt sit on chair to ascend platform step.  Pt has mentioned several ways to enter home ( ramp,installing 2 rails etc) however pt very vague on how she is planning on entering home. Told pt to talk to family and discuss it with them. Explained to pt that therapist needs to know what will be available so pt can practice technique to negotiate steps. In PM, reviewed home set up. 2 MITCHELL without HR to front porch with one more step into home (curb style) at front door vs 3 MITCHELL with one HR. If pt enters the front door, one more step in the home (curb style) required to get to the bedroom. Trial stair negotiation with bilateral HR however pt reporting  Fearful of attempting due to grasp sliding due to HR's width apart. Discussed HR and axillary crutch however pt declining to complete at this time. Discussed car transfer however pt prefers to trial into personal vehicle. Per current home set up, safety to hop up step BW with ww and upon first step, sit onto w/c on the next step. At that time, pt would be on the porch and could complete the standing at ww and sitting onto chair on the higher step into the home through the main door, as well as completing once more while in the home to the bedroom. Will continue to trial steps until ramp solidified. Time in: 1000  Time out: 1045    Time in: 1345  Time out: 0    Pt is making fair progress toward established Physical Therapy goals. Continue with physical therapy current plan of care.     Hanny Garcia (AM)  License Number: [id number]    Emma KenoshaRUBIA   [id number] (PM)

## 2021-03-03 NOTE — PROGRESS NOTES
Progress Note    Subjective/   65y.o. year old female on the rehab unit for revision (L) knee arthroplasty. Tolerating therapy program.  Had a small BM. Voiding without difficulty. No SOB or chest pain. Objective/   VITALS:  /74   Pulse 82   Temp 98.6 °F (37 °C) (Temporal)   Resp 16   Ht 5' 5\" (1.651 m)   Wt 260 lb (117.9 kg)   SpO2 97%   BMI 43.27 kg/m²   24HR INTAKE/OUTPUT:      Intake/Output Summary (Last 24 hours) at 3/2/2021 2141  Last data filed at 3/2/2021 1904  Gross per 24 hour   Intake 1080 ml   Output --   Net 1080 ml     Constitutional:  Alert, awake, no apparent distress   Cardiovascular:  S1, S2 without m/r/g   Respiratory:  CTA B without w/r/r   Abdomen: +BS  Ext: no calf tenderness LE edema, (L) knee orthosis in place. Alignment good  Neuro: aaox3, light touch intact    Functional Level      Date   Status   AE    Comments     Feeding   3/1/21   Mod I             Grooming   3/1/21   Mod I   seated               Oral Care   3/1/21   Mod I   seated         Bathing   3/1/21   Min/CGA             UB Dressing   3/1/21   Setup             LB Dressing   3/1/21   CGA   LB AE         Footwear   3/1/21   Min A   LB AE         Toileting   3/1/21   CGA             Homemaking   3/2/21   Min A   W/c level     reacher Pt completed simple laundry folding activity seated at table with focus on increasing B UE strength. Pt reports she enjoys completing laundry and plans to continue this activity upon discharge.           Functional Transfers / Balance:      Date Status DME  Comments   Sit Balance 3/2/21   Mod I       Stand Balance 3/1/21   CGA Grab bar     []? Tub  []? Shower   Transfer     TBA       Commode   Transfer 3/1/21   CGA Grab bar,  Bariatric 3-in-1     Functional   Mobility 3/2/21   CGA/Min A W/c propulsion Assist required to maneuver in smaller spaces and around environmental obstacles when completing functional mobility in OT clinic.     Other:  Sit<>Stand:     3/1/21    CGA/SBA          Functional Exercises / Activity:  -UBE completed seated 2x5 minutes with focus on increasing B UE strength and overall endurance for functional activities. Fair+ tolerance.   -Shoulder arc completed seated at table with 1# wrist weights donned with focus on increasing B UE strength and overall endurance for functional transfers/ADL tasks. Fair tolerance. -Yellow Candobar completed x30 in all planes to increase B UE strength and overall endurance. Fair+ tolerance. -2# dumb bell exercises completed 2x15 reps with L and R UE to increase strength and overall endurance for functional transfers/ADL tasks. Fair+ tolerance.      Sensory / Neuromuscular Re-Education:        Cognitive Skills:    Status Comments   Problem   Solving good      Memory good      Sequencing fair +     Safety fair             Scheduled Meds:   amLODIPine  5 mg Oral Daily    simvastatin  40 mg Oral Nightly    acetaminophen  650 mg Oral Q6H    aspirin  325 mg Oral BID    sennosides-docusate sodium  1 tablet Oral BID    sodium chloride flush  10 mL Intravenous 2 times per day    buPROPion  300 mg Oral QAM     Continuous Infusions:  PRN Meds:hydrALAZINE, lactulose, magnesium hydroxide, ondansetron **OR** [DISCONTINUED] ondansetron, oxyCODONE **OR** oxyCODONE, sodium chloride flush, polyethylene glycol  I/O last 3 completed shifts: In: 1080 [P.O.:1080]  Out: -   I/O this shift: In: 480 [P.O.:480]  Out: -     Labs reviewed  CBC: No results for input(s): WBC, HGB, PLT in the last 72 hours. BMP:  No results for input(s): NA, K, CL, CO2, BUN, CREATININE, GLUCOSE in the last 72 hours. Hepatic: No results for input(s): AST, ALT, ALB, BILITOT, ALKPHOS in the last 72 hours. BNP: No results for input(s): BNP in the last 72 hours. Lipids: No results for input(s): CHOL, HDL in the last 72 hours. Invalid input(s): LDLCALCU  INR: No results for input(s): INR in the last 72 hours.     Assessment/  Patient Active Problem List: Abnormal nuclear stress test     Essential hypertension     Primary osteoarthritis of left knee     Hyperlipidemia     Fatty liver     Anxiety     Vitamin D deficiency     Class 3 severe obesity due to excess calories with serious comorbidity and body mass index (BMI) of 40.0 to 44.9 in adult [facility])     EKG abnormalities     Carotid stenosis, left     Pre-diabetes     Varicose veins of both lower extremities with pain     Chronic right-sided low back pain without sciatica     COVID-19     History of revision of total knee arthroplasty      Plan/  1. Continue rehab program  2. Continue bowel medications  3. Continue pain control  4. Continue dvt prophylaxis  5.  Continue precautions per ortho          Mariano Rueda MD

## 2021-03-04 PROCEDURE — 6370000000 HC RX 637 (ALT 250 FOR IP): Performed by: STUDENT IN AN ORGANIZED HEALTH CARE EDUCATION/TRAINING PROGRAM

## 2021-03-04 PROCEDURE — 6370000000 HC RX 637 (ALT 250 FOR IP): Performed by: PHYSICAL MEDICINE & REHABILITATION

## 2021-03-04 PROCEDURE — 97535 SELF CARE MNGMENT TRAINING: CPT

## 2021-03-04 PROCEDURE — 2580000003 HC RX 258: Performed by: STUDENT IN AN ORGANIZED HEALTH CARE EDUCATION/TRAINING PROGRAM

## 2021-03-04 PROCEDURE — 97110 THERAPEUTIC EXERCISES: CPT

## 2021-03-04 PROCEDURE — 1280000000 HC REHAB R&B

## 2021-03-04 PROCEDURE — 97530 THERAPEUTIC ACTIVITIES: CPT

## 2021-03-04 RX ADMIN — ACETAMINOPHEN 650 MG: 325 TABLET ORAL at 10:14

## 2021-03-04 RX ADMIN — BUPROPION HYDROCHLORIDE 300 MG: 300 TABLET, FILM COATED, EXTENDED RELEASE ORAL at 08:18

## 2021-03-04 RX ADMIN — OXYCODONE HYDROCHLORIDE 10 MG: 10 TABLET ORAL at 17:48

## 2021-03-04 RX ADMIN — ASPIRIN 325 MG: 325 TABLET, COATED ORAL at 20:40

## 2021-03-04 RX ADMIN — OXYCODONE HYDROCHLORIDE 10 MG: 10 TABLET ORAL at 08:18

## 2021-03-04 RX ADMIN — OXYCODONE HYDROCHLORIDE 10 MG: 10 TABLET ORAL at 22:51

## 2021-03-04 RX ADMIN — AMLODIPINE BESYLATE 5 MG: 5 TABLET ORAL at 08:19

## 2021-03-04 RX ADMIN — OXYCODONE HYDROCHLORIDE 10 MG: 10 TABLET ORAL at 12:37

## 2021-03-04 RX ADMIN — ACETAMINOPHEN 650 MG: 325 TABLET ORAL at 15:55

## 2021-03-04 RX ADMIN — Medication 40 MG: at 20:40

## 2021-03-04 RX ADMIN — Medication 10 ML: at 08:20

## 2021-03-04 RX ADMIN — ASPIRIN 325 MG: 325 TABLET, COATED ORAL at 08:18

## 2021-03-04 RX ADMIN — ACETAMINOPHEN 650 MG: 325 TABLET ORAL at 06:50

## 2021-03-04 RX ADMIN — DOCUSATE SODIUM 50 MG AND SENNOSIDES 8.6 MG 1 TABLET: 8.6; 5 TABLET, FILM COATED ORAL at 20:40

## 2021-03-04 RX ADMIN — DOCUSATE SODIUM 50 MG AND SENNOSIDES 8.6 MG 1 TABLET: 8.6; 5 TABLET, FILM COATED ORAL at 08:18

## 2021-03-04 ASSESSMENT — PAIN DESCRIPTION - PAIN TYPE
TYPE: ACUTE PAIN
TYPE: ACUTE PAIN

## 2021-03-04 ASSESSMENT — PAIN DESCRIPTION - PROGRESSION: CLINICAL_PROGRESSION: NOT CHANGED

## 2021-03-04 ASSESSMENT — PAIN DESCRIPTION - LOCATION
LOCATION: KNEE
LOCATION: ANKLE
LOCATION: ANKLE

## 2021-03-04 ASSESSMENT — PAIN - FUNCTIONAL ASSESSMENT: PAIN_FUNCTIONAL_ASSESSMENT: ACTIVITIES ARE NOT PREVENTED

## 2021-03-04 ASSESSMENT — PAIN DESCRIPTION - ORIENTATION: ORIENTATION: LEFT

## 2021-03-04 ASSESSMENT — PAIN SCALES - GENERAL
PAINLEVEL_OUTOF10: 7
PAINLEVEL_OUTOF10: 0
PAINLEVEL_OUTOF10: 2
PAINLEVEL_OUTOF10: 7
PAINLEVEL_OUTOF10: 8
PAINLEVEL_OUTOF10: 6

## 2021-03-04 ASSESSMENT — PAIN SCALES - WONG BAKER
WONGBAKER_NUMERICALRESPONSE: 0

## 2021-03-04 ASSESSMENT — PAIN DESCRIPTION - DESCRIPTORS
DESCRIPTORS: ACHING;THROBBING;OTHER (COMMENT)
DESCRIPTORS: ACHING

## 2021-03-04 ASSESSMENT — PAIN DESCRIPTION - FREQUENCY: FREQUENCY: CONTINUOUS

## 2021-03-04 NOTE — DISCHARGE SUMMARY
Physician Discharge Summary     Patient ID:  Trent Valladares  [id number]  8 y.o.  1950    Admit date: 2/22/2021    Discharge date and time: 2/24/2021  5:46 PM     Admitting Physician: Cynthia Garcia MD     Discharge Physician: Cynthia Garcia MD    Admission Diagnoses: History of revision of total knee arthroplasty [Z96.659]    Discharge Diagnoses: s/p revision of left total knee arthroplasty     Admission Condition: good    Discharged Condition: good    Hospital Course: The patient was admitted from the [facility] area/OR. After examination, review of radiologic studies, and appropriate pre-operative risk assessment, it was recommended by Cynthia Garcia MD to undergo revision of the left total knee arthroplasty. The patient underwent an uneventful course of left revision of total knee arthroplasty by Cynthia Garcia MD on 2/22/21. The patient was subsequently taken to the PACU and the floor in stable condition. The patient was continued on antibiotics for 24 hours post-operatively as they received a dose of antibiotics pre-operatively as well. The patient was started on DVT prophylaxis and physical therapy with instructions to be touchdown weight bearing in a knee immobilizer. The knee immobilizer was transitioned to a hinged knee brace. Blood counts were followed daily. Operative cultures were obtained and assessed. The dressing was to remain in place for 7 days post operatively and at that time was to be removed and replaced with a clean, dry, dressing. On POD 1, the chung catheter was ordered to be discontinued.  was consulted for d/c planning. On POD 2, after the patient had made appropriate gains in regaining independent function with physical therapy, the patient was discharged to a rehab facility in stable condition.     Treatments: s/p revision of left total knee arthroplasty     Disposition: The patient was provided instructions to continue DVT

## 2021-03-04 NOTE — PROGRESS NOTES
Comprehensive Nutrition Assessment    Type and Reason for Visit:  RD Nutrition Re-Screen/LOS    Nutrition Recommendations/Plan: Continue current diet, Start Ensure HP BID    Nutrition Assessment:  Pt admit to ARU s/p L TKA revision w/ increased nutrient needs for surgical healing. Will add ONS and continue to monitor. Malnutrition Assessment:  Malnutrition Status:  No malnutrition    Context:  Acute Illness     Findings of the 6 clinical characteristics of malnutrition:  Energy Intake:  No significant decrease in energy intake  Weight Loss:  No significant weight loss     Body Fat Loss:  No significant body fat loss     Muscle Mass Loss:  No significant muscle mass loss    Fluid Accumulation:  No significant fluid accumulation     Strength:  Not Performed    Estimated Daily Nutrient Needs:  Energy (kcal):  ; Weight Used for Energy Requirements:  Current     Protein (g):  ; Weight Used for Protein Requirements:  Ideal(1.5-1.8)        Fluid (ml/day):  ; Method Used for Fluid Requirements:  1 ml/kcal      Nutrition Related Findings:  A&Ox4, active BS, soft abd, +1 edema, +I/Os      Wounds:  Surgical Incision       Current Nutrition Therapies:    DIET GENERAL;     Anthropometric Measures:  · Height: 5' 5\" (165.1 cm)  · Current Body Weight: 260 lb (117.9 kg)(2/24)   · Usual Body Weight: (UTO, no actual wt hx on file)     · Ideal Body Weight: 125 lbs; % Ideal Body Weight 208 %   · BMI: 43.3  · BMI Categories: Obese Class 3 (BMI 40.0 or greater)       Nutrition Diagnosis:   · Increased nutrient needs related to increase demand for energy/nutrients as evidenced by wounds    Nutrition Interventions:   Food and/or Nutrient Delivery:  Continue Current Diet, Start Oral Nutrition Supplement  Nutrition Education/Counseling:  Education not indicated   Coordination of Nutrition Care:  Continue to monitor while inpatient    Goals:  pt to consume >75% meals/ONS       Nutrition Monitoring and Evaluation: Food/Nutrient Intake Outcomes:  Food and Nutrient Intake, Supplement Intake  Physical Signs/Symptoms Outcomes:  Biochemical Data, GI Status, Fluid Status or Edema, Nutrition Focused Physical Findings, Skin, Weight     Discharge Planning:     Too soon to determine     Electronically signed by Catrachito Soria, MS, RD, LD on 3/4/21 at 1:44 PM EST    Contact: [phone]

## 2021-03-04 NOTE — PROGRESS NOTES
Physical Therapy    [facility]Department[facility]      NAME: Jason Batres  : 1950  MRN: [id number]    Date of Service: 3/4/2021    Evaluating Therapist: Julio Dumont DPT    ROOM: [address] [place] [address]  DIAGNOSIS: History of revision of total knee arthroplasty   PMH: Pt had elective surgery for revision of left total knee arthroplasty with explantation of previous femoral and tibial component and revision of both the femoral and entire tibial component on 21. PMH includes DJD, HLD, Essential HTN, OA, Anxiety, Chronic Back Pain, Liver Disease, Obesity, Tricuspid Regurgitation    PRECAUTIONS: TTWB LLE, L Hinged Knee Brace (0-30 AROM per VO (Dr. Francisco Keane 21)    Social:  Pt lives with spouse in a [facility] with 3 stairs to enter with single rail (R HR). Equipment Owned: [employer] and [employer]. Pt ambulated with no device independently PTA. Initial Evaluation  21 AM   PM Short Term Goals Long Term Goals    Was pt agreeable to Eval/treatment? Yes yes Yes      Does pt have pain?  L knee pain 6/10 5/10 L knee pain  L knee pain      Bed Mobility  Rolling: SBA  Supine to sit: Rachel (LLE support)  Sit to supine: Rachel (LLE support)  Scooting: SBA Using a sheet for L LE management with bed mobility  Supine to sit mod I   Sit to supine mod  I NT Sup Mod I   Transfers Sit to stand: Rachel  Stand to sit: cgA  Stand pivot: cgA/Rachel with bariatric ww Sit to stand mod I   Stand to sit mod I   Stand pivot supervision  with bariatric ww Sit to stand mod I   Stand to sit mod I   Stand pivot supervision  with bariatric ww Sup with AAD Mod I with AAD   Ambulation   15 feet with bariatric ww with cgA/Rachel 75 feet , 50 feet and 15 feet x2 with bariatric ww with supervision  75 feet  with bariatric ww supervision  50 feet with AAD with Sup 150 feet with AAD with Mod I   Walking 10 feet on uneven surface NT N/T NT     Wheel Chair Mobility 15 feet with B UE and SBA NT NT >150 feet with B UE with Sup  >300 feet with B UE with Mod I   Car Transfers NT N/T Will assess transfer in pt's vehicle  Sup with AAD Mod I with AAD   Stair negotiation: ascended and descended                     NT 3 steps with bilateral rails with min A ( with 2nd person for safety)   2 steps with bilateral rails with min A  NT 2 steps with B rail with Sup 4 steps with 1 rail and AAD with Mod I   Curb Step:   ascended and descended 2 inch step with // bars and cg/Rachel (ascending BW) NT 4 in curb step in // bars sba    6 in curb step in // bars cg/sba  4 inch step with AAD and Sup 4 inch step with AAD and Mod I   Picking up object off the floor NT       BLE ROM WFL       BLE Strength R LE 4/5  L LE: grossly  3-/5 (hip & knee),   3+/5 (ankle) (limited by pain)       Balance  Sitting: Independent  Standing: cgA with ww Standing CGA      Date Family Teach Completed TBD       Is additional Family Teaching Needed? Y or N Y       Hindering Progress L LE weakness, Balance, TTWB, Stairs Balance weakness      PT recommended ELOS 2 weeks       Team's Discharge Plan        Therapist at Team Meeting            Pt performed therapeutic exercise of the following:  AM  Step ups using 6 inch step ( using bariatric ww for support) with min A ( x3-4 reps)   Seated exercises with R LE only 4# weights   Ankle pumps ( x30) LAQs ( 2x15)  Marching ( 2x15)   PM:   Standing at ww: hip flexion/abduction/extension 2x10 reps L LE only     Patient education  Pt was educated on technique with stair negotiation     Patient response to education:   Pt verbalized understanding Pt demonstrated skill Pt requires further education in this area   x X with verbal cues /assistance  x     Additional Comments: Pt completed functional mobility as noted above. Adjusted L knee hinged brace for appropriate fit . Educated pt on proper alignment and fit. Pt with good understanding of education. Pt making progress toward goals however still having difficulty with stair negotiation.   Much time spent on practicing techniques with stair negotiation. Pt verbalized nervous about car transfer and stair negotiation. Therapist stressed that will focus on stair negotiation and will complete a car transfer in pt' vehicle. Daughter to attend therapy tomorrow for training and will address all concerns. Time in: 1000  Time out: 1045    Time in: 1345  Time out: 0    Pt is making fair progress toward established Physical Therapy goals. Continue with physical therapy current plan of care.     Sorin Hough Number: [id number]

## 2021-03-04 NOTE — PROGRESS NOTES
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 Oral Care 3/3/21 Mod I seated    Bathing 3/3/21 SBA     UB Dressing 3/3/21 Independent     LB Dressing 3/3/21 SBA/S LB AE    Footwear 3/4/21 Supervision LB AE To don/doff slip on shoes seated at EOB. Toileting 3/4/21 SBA/S  For balance/safety while standing to manage clothing over hips and complete toilet hygiene d/t unsteadiness. Homemaking 3/4/21 Min A W/c level   reacher Pt completed meal prep activity of making tea. Pt filled tea pot and placed on stove then prepared a cup of tea w/c level. Pt required cues for safe w/c mobility. Utilized reacher to complete functional item retrieval throughout task. Functional Transfers / Balance:   Date Status DME  Comments   Sit Balance 3/4/21 Mod I  Seated on commode and at EOB. Stand Balance 3/4/21 SBA/S Grab bar Demo'd during dynamic ADL tasks. [] Tub  [] Shower   Transfer  TBA- See Comments  Therapist initiated ETB transfer this AM. Pt reports she has been \"thinking about it\" and since she doesn't know when she is able to remove L LE brace and/or shower, and also because in order to prop L LE on side of tub she would have to sit with her back to faucet/shower head because of shower setup she doesn't think she needs to [facility] about it\" just yet and will just sponge bath for a while. Pt reports she has an ETB and utilized it previously following sx. Discussed ETB transfer safety and proper technique to utilize bench and pt verbalized good understanding.    Commode   Transfer 3/4/21 SBA/S Grab bar,  Bariatric 3-in-1 SPT from w/c<>3-in-1 with assist for safety. Functional   Mobility 3/4/21 Supervision W/c  Min cues for w/c maneuverability techniques when maneuvering in smaller kitchen space and around various environmental obstacles in simulated living environment. Other:  Sit<>Stand:    3/4/21   SBA/S    For safety. Functional Exercises / Activity:  -3# dowel rosenda exercises completed 3x10 reps in all planes with focus on increasing B UE strength and overall endurance for functional activities. Fair+ tolerance. -Resistive hand gripper x30 with B hands with focus on increasing B hand/ strength for walker management. -1# . completed x10 reps to increase B UE strength for functional activities. Fair+ tolerance. -Medium resistance putty/wheel activity completed seated at table to increase B UE strength and overall endurance for functional activities. Fair+ tolerance. Sensory / Neuromuscular Re-Education:      Cognitive Skills:   Status Comments   Problem   Solving good     Memory good     Sequencing fair +    Safety fair       Visual Perception:    Education:  -Pt educated on kitchen safety and functional item retrieval techniques. Pt verbalized/demonstrated good understanding, recommend continued education. [] Family teach completed on:    Pain Level:3/10 L LE. Additional Notes:  3/3/21: Pt issued LB AE this date for increased independence with ADL tasks. Patient has made good  progress during treatment sessions toward set goals.  Therapy emphasis to obtain goals:   Current Treatment Recommendations: Strengthening, Endurance Training, Patient/Caregiver Education & Training, Equipment Evaluation, Education, & procurement, Self-Care / ADL, Balance Training, Gait Training, Home Management Training, Functional Mobility Training, Safety Education & Training    Long term goal 1: Pt demo independent to eat all meals  Long term goal 2: Pt demo Mod I grooming seated or standing  Long term goal 3: Pt demo SBA to sponge bathe both seated & standing  Long term goal 4: Pt demo I UE & Mod I LE dressinculding underwear, pants, socks & shoes using AE as needed  Long term goal 5: Pt demo Mod I commode trf & Mod I toileting & use approrpaite DME to ensure pt safety  Long term goals 6: Pt demo SBA walk in shower trf using appropraite DME to ensurept safety  Long term goal 7: Pt demo Mod I light homemaking activity & demo G- safety & insight  Long term goal 8: Pt demo G- endurance for a 20-30 minute functional activity  Long term goal 9: Pt demo independent to complete a BUE strengtheing home exercise program    [x] Continue with current OT Plan of care. [] Prepare for Discharge     DISCHARGE RECOMMENDATIONS  Recommended DME: LB AE, bariatric 3-in-1 commode, LB AE(issued)    Post Discharge Care:   []Home Independently  []Home with 24hr Care / Supervision [x]Home with Partial Supervision []Home with Home Health OT []Home with Out Pt OT []Other: ___   Comments:         Time in Time out Tx Time Breakdown  Variance:   First Session  [id number] [x] Individual Tx-45 Mins  [] Concurrent Tx -  [] Co-Tx -   [] Group Tx -   [] Time Missed -     Second Session 1300 1345 [x] Individual Tx-45 Mins  [] Concurrent Tx -  [] Co-Tx -   [] Group Tx -   [] Time Missed -     Third Session    [] Individual Tx-   [] Concurrent Tx -  [] Co-Tx -   [] Group Tx -   [] Time Missed -         Total Tx Time 90 Mins     Jose Alberto Pabon BELTR[place]N/L [id number]  I have read the above note and agree with the documentation.   Ladarius Joe OTR/L [id number]

## 2021-03-04 NOTE — PROGRESS NOTES
descended                     NT 3 steps with bilateral rails with min A ( 2nd person present for safety )    Negotiated 6 inch step with ww with min A ( ascending backward )       Ascending backward  And descending forward. Due to hinged brace set at 0-30 degrees 2 steps with B rail with Sup 4 steps with 1 rail and AAD with Mod I   Curb Step:   ascended and descended 2 inch step with // bars and cg/Rachel (ascending BW)   4 inch step with AAD and Sup 4 inch step with AAD and Mod I   Picking up object off the floor NT       BLE ROM WFL       BLE Strength R LE 4/5  L LE: grossly  3-/5 (hip & knee),   3+/5 (ankle) (limited by pain)       Balance  Sitting: Independent  Standing: cgA with ww       Date Family Teach Completed TBD Daughter to attend this pm       Is additional Family Teaching Needed? Y or N Y       Hindering Progress L LE weakness, Balance, TTWB, Stairs       PT recommended ELOS 2 weeks At beginning of week   (if family cane provide assistance with steps to enter home )       Team's Discharge Plan 2 weeks Discharge Tuesday 3/9/21      Therapist at Team Meeting Teresa Cornejo PT, DPT [id number]   Teresa Cornejo PT, DPT [id number]          Date:  2/25/2021  Supporting factors: Motivated to participate in therapy  Barriers to discharge:  L LE weakness, TTWB L LE,   Additional comments: Will continue to focus on strength and functional mobility especially with stair negotiation. Pt may need a 2nd rail installed to enter home vs ramp pending progress. DME:  TBD  After Care:  TBD    Alie Javier [id number]    Date:  3/4/2021  Supporting factors:  Supportive family   Barriers to discharge:  TTWB L LE, limited knee ROM of L LE ( hinged brace )  Additional comments:  Pt making progress toward goals however pt still having some difficulty with stair negotiation. Pt has voiced possible ramp however when questioned about ramp installation or 2nd rail installation pt very vague.   Will discussed with daughter during family training.     DME:  [employer] ( pt owns one)   After Care:  Home PT with supervision as needed    Celio Simpson PTA

## 2021-03-04 NOTE — PROGRESS NOTES
dynamic ADL tasks. []? Tub  []? Shower   Transfer     TBA- See Comments   Therapist initiated ETB transfer this AM. Pt reports she has been \"thinking about it\" and since she doesn't know when she is able to remove L LE brace and/or shower, and also because in order to prop L LE on side of tub she would have to sit with her back to faucet/shower head because of shower setup she doesn't think she needs to [facility] about it\" just yet and will just sponge bath for a while. Pt reports she has an ETB and utilized it previously following sx. Discussed ETB transfer safety and proper technique to utilize bench and pt verbalized good understanding. Commode   Transfer 3/4/21   SBA/S Grab bar,  Bariatric 3-in-1 SPT from w/c<>3-in-1 with assist for safety. Functional   Mobility 3/4/21   Supervision W/c  Min cues for w/c maneuverability techniques when maneuvering in smaller kitchen space and around various environmental obstacles in simulated living environment. Other:  Sit<>Stand:     3/4/21    SBA/S      For safety.      Functional Exercises / Activity:  -3# dowel rosenda exercises completed 3x10 reps in all planes with focus on increasing B UE strength and overall endurance for functional activities. Fair+ tolerance. -Resistive hand gripper x30 with B hands with focus on increasing B hand/ strength for walker management. -1#  completed x10 reps to increase B UE strength for functional activities. Fair+ tolerance. -Medium resistance putty/wheel activity completed seated at table to increase B UE strength and overall endurance for functional activities.  Fair+ tolerance.      Sensory / Neuromuscular Re-Education:        Cognitive Skills:    Status Comments   Problem   Solving good      Memory good      Sequencing fair +     Safety fair             Scheduled Meds:   amLODIPine  5 mg Oral Daily    simvastatin  40 mg Oral Nightly    acetaminophen  650 mg Oral Q6H    aspirin  325 mg Oral BID    sennosides-docusate sodium  1 tablet Oral BID    sodium chloride flush  10 mL Intravenous 2 times per day    buPROPion  300 mg Oral QAM     Continuous Infusions:  PRN Meds:hydrALAZINE, lactulose, magnesium hydroxide, ondansetron **OR** [DISCONTINUED] ondansetron, oxyCODONE **OR** oxyCODONE, sodium chloride flush, polyethylene glycol  I/O last 3 completed shifts: In: 660 [P.O.:660]  Out: -   No intake/output data recorded. Labs reviewed  CBC: No results for input(s): WBC, HGB, PLT in the last 72 hours. BMP:  No results for input(s): NA, K, CL, CO2, BUN, CREATININE, GLUCOSE in the last 72 hours. Hepatic: No results for input(s): AST, ALT, ALB, BILITOT, ALKPHOS in the last 72 hours. BNP: No results for input(s): BNP in the last 72 hours. Lipids: No results for input(s): CHOL, HDL in the last 72 hours. Invalid input(s): LDLCALCU  INR: No results for input(s): INR in the last 72 hours. Assessment/  Patient Active Problem List:     Abnormal nuclear stress test     Essential hypertension     Primary osteoarthritis of left knee     Hyperlipidemia     Fatty liver     Anxiety     Vitamin D deficiency     Class 3 severe obesity due to excess calories with serious comorbidity and body mass index (BMI) of 40.0 to 44.9 in adult [facility])     EKG abnormalities     Carotid stenosis, left     Pre-diabetes     Varicose veins of both lower extremities with pain     Chronic right-sided low back pain without sciatica     COVID-19     History of revision of total knee arthroplasty      Plan/  1. Continue rehab program  2. Maintain range of motion 0 to 30 degrees  3. Maintain orthotic as per Ortho  4. Continue DVT prophylaxis  5. Continue pain control to allow participation in the full therapy program  6.  Continue blood pressure control          Renard Petty MD

## 2021-03-04 NOTE — PROGRESS NOTES
Progress Note    Subjective/   65y.o. year old female on the rehab unit for revision left knee arthroplasty. Tolerating therapy. No new complaints. Pain is controlled. Voiding without difficulty. Objective/   VITALS:  BP (!) 143/64   Pulse 78   Temp 97.7 °F (36.5 °C) (Temporal)   Resp 16   Ht 5' 5\" (1.651 m)   Wt 260 lb (117.9 kg)   SpO2 97%   BMI 43.27 kg/m²   24HR INTAKE/OUTPUT:      Intake/Output Summary (Last 24 hours) at 3/3/2021 2008  Last data filed at 3/3/2021 1904  Gross per 24 hour   Intake 840 ml   Output --   Net 840 ml     Constitutional:  Alert, awake, no apparent distress   Cardiovascular:  S1, S2 without m/r/g   Respiratory:  CTA B without w/r/r   Abdomen: Positive bowel sounds  Ext: Alignment good. Minimal swelling left lower extremity. Orthosis in place. No calf tenderness  Neuro: Awake alert and oriented x3. Light touch intact. Improved contraction of the left quadriceps. Functional Level    Initial Evaluation  2/25/21 AM   PM Short Term Goals Long Term Goals    Was pt agreeable to Eval/treatment? Yes yes Yes        Does pt have pain?  L knee pain 6/10 5/10 L knee pain  7/10 L knee pain        Bed Mobility  Rolling: SBA  Supine to sit: Rachel (LLE support)  Sit to supine: Rachel (LLE support)  Scooting: SBA Using a sheet for L LE management with bed mobility  Supine to sit supervision   Sit to supine supervision        Sup Mod I   Transfers Sit to stand: Rachel  Stand to sit: cgA  Stand pivot: cgA/Rachel with bariatric ww Sit to stand SBA  Stand to sit SBA  Stand pivot SBA with bariatric ww Sit to stand mod I   Stand to sit mod I   Stand pivot sup with bariatric ww Sup with AAD Mod I with AAD   Ambulation   15 feet with bariatric ww with cgA/Rachel 25 feet and 15 feet x2 bariatric ww SBA 75' with bariatric ww sup  50 feet with AAD with Sup 150 feet with AAD with Mod I   Walking 10 feet on uneven surface NT N/T NT       Wheel Chair Mobility 15 feet with B UE and SBA NT NT >150 feet with B UE with Sup  >300 feet with B UE with Mod I   Car Transfers NT N/T declined Sup with AAD Mod I with AAD   Stair negotiation: ascended and descended                     NT N/T See Comments 2 steps with B rail with Sup 4 steps with 1 rail and AAD with Mod I   Curb Step:   ascended and descended 2 inch step with // bars and cg/Rachel (ascending BW) See comments  4 in curb step in // bars sba     6 in curb step in // bars cg/sba  4 inch step with AAD and Sup 4 inch step with AAD and Mod I   Picking up object off the floor NT           BLE ROM WFL           BLE Strength R LE 4/5  L LE: grossly  3-/5 (hip & knee),   3+/5 (ankle) (limited by pain)           Balance  Sitting: Independent  Standing: cgA with ww Standing CGA               Scheduled Meds:   amLODIPine  5 mg Oral Daily    simvastatin  40 mg Oral Nightly    acetaminophen  650 mg Oral Q6H    aspirin  325 mg Oral BID    sennosides-docusate sodium  1 tablet Oral BID    sodium chloride flush  10 mL Intravenous 2 times per day    buPROPion  300 mg Oral QAM     Continuous Infusions:  PRN Meds:hydrALAZINE, lactulose, magnesium hydroxide, ondansetron **OR** [DISCONTINUED] ondansetron, oxyCODONE **OR** oxyCODONE, sodium chloride flush, polyethylene glycol  I/O last 3 completed shifts: In: 12 [P.O.:960]  Out: -   I/O this shift:  In: 360 [P.O.:360]  Out: -     Labs reviewed  CBC: No results for input(s): WBC, HGB, PLT in the last 72 hours. BMP:  No results for input(s): NA, K, CL, CO2, BUN, CREATININE, GLUCOSE in the last 72 hours. Hepatic: No results for input(s): AST, ALT, ALB, BILITOT, ALKPHOS in the last 72 hours. BNP: No results for input(s): BNP in the last 72 hours. Lipids: No results for input(s): CHOL, HDL in the last 72 hours. Invalid input(s): LDLCALCU  INR: No results for input(s): INR in the last 72 hours.     Assessment/  Patient Active Problem List:     Abnormal nuclear stress test     Essential hypertension     Primary osteoarthritis of left knee     Hyperlipidemia     Fatty liver     Anxiety     Vitamin D deficiency     Class 3 severe obesity due to excess calories with serious comorbidity and body mass index (BMI) of 40.0 to 44.9 in adult [facility])     EKG abnormalities     Carotid stenosis, left     Pre-diabetes     Varicose veins of both lower extremities with pain     Chronic right-sided low back pain without sciatica     COVID-19     History of revision of total knee arthroplasty      Plan/  1. Continue rehab program with focus on strengthening, improve mobility and reduced care burden  2. Continue pain control  3. Continue DVT prophylaxis  4. Continue skin care  5. Continue to monitor blood sugars  6.  Continue blood pressure control          Jermain Lovell MD

## 2021-03-05 DIAGNOSIS — T84.033A LOOSENING OF PROSTHESIS OF LEFT TOTAL KNEE REPLACEMENT, INITIAL ENCOUNTER (HCC): Primary | ICD-10-CM

## 2021-03-05 PROCEDURE — 97530 THERAPEUTIC ACTIVITIES: CPT

## 2021-03-05 PROCEDURE — 1280000000 HC REHAB R&B

## 2021-03-05 PROCEDURE — 6370000000 HC RX 637 (ALT 250 FOR IP): Performed by: PHYSICAL MEDICINE & REHABILITATION

## 2021-03-05 PROCEDURE — 97535 SELF CARE MNGMENT TRAINING: CPT

## 2021-03-05 PROCEDURE — 6370000000 HC RX 637 (ALT 250 FOR IP): Performed by: STUDENT IN AN ORGANIZED HEALTH CARE EDUCATION/TRAINING PROGRAM

## 2021-03-05 RX ADMIN — OXYCODONE HYDROCHLORIDE 10 MG: 10 TABLET ORAL at 03:54

## 2021-03-05 RX ADMIN — DOCUSATE SODIUM 50 MG AND SENNOSIDES 8.6 MG 1 TABLET: 8.6; 5 TABLET, FILM COATED ORAL at 08:21

## 2021-03-05 RX ADMIN — OXYCODONE HYDROCHLORIDE 10 MG: 10 TABLET ORAL at 23:09

## 2021-03-05 RX ADMIN — ASPIRIN 325 MG: 325 TABLET, COATED ORAL at 08:21

## 2021-03-05 RX ADMIN — ACETAMINOPHEN 650 MG: 325 TABLET ORAL at 16:41

## 2021-03-05 RX ADMIN — Medication 40 MG: at 20:36

## 2021-03-05 RX ADMIN — ASPIRIN 325 MG: 325 TABLET, COATED ORAL at 20:35

## 2021-03-05 RX ADMIN — AMLODIPINE BESYLATE 5 MG: 5 TABLET ORAL at 08:21

## 2021-03-05 RX ADMIN — ACETAMINOPHEN 650 MG: 325 TABLET ORAL at 11:02

## 2021-03-05 RX ADMIN — OXYCODONE HYDROCHLORIDE 10 MG: 10 TABLET ORAL at 08:28

## 2021-03-05 RX ADMIN — ACETAMINOPHEN 650 MG: 325 TABLET ORAL at 23:07

## 2021-03-05 RX ADMIN — DOCUSATE SODIUM 50 MG AND SENNOSIDES 8.6 MG 1 TABLET: 8.6; 5 TABLET, FILM COATED ORAL at 20:35

## 2021-03-05 RX ADMIN — BUPROPION HYDROCHLORIDE 300 MG: 300 TABLET, FILM COATED, EXTENDED RELEASE ORAL at 08:21

## 2021-03-05 RX ADMIN — OXYCODONE HYDROCHLORIDE 10 MG: 10 TABLET ORAL at 17:26

## 2021-03-05 RX ADMIN — OXYCODONE HYDROCHLORIDE 10 MG: 10 TABLET ORAL at 12:58

## 2021-03-05 ASSESSMENT — PAIN DESCRIPTION - ONSET
ONSET: ON-GOING
ONSET: ON-GOING

## 2021-03-05 ASSESSMENT — PAIN DESCRIPTION - LOCATION
LOCATION: LEG

## 2021-03-05 ASSESSMENT — PAIN SCALES - GENERAL
PAINLEVEL_OUTOF10: 6
PAINLEVEL_OUTOF10: 7
PAINLEVEL_OUTOF10: 7
PAINLEVEL_OUTOF10: 3
PAINLEVEL_OUTOF10: 4

## 2021-03-05 ASSESSMENT — PAIN DESCRIPTION - ORIENTATION
ORIENTATION: LEFT

## 2021-03-05 ASSESSMENT — PAIN DESCRIPTION - PROGRESSION: CLINICAL_PROGRESSION: NOT CHANGED

## 2021-03-05 ASSESSMENT — PAIN DESCRIPTION - FREQUENCY
FREQUENCY: CONTINUOUS
FREQUENCY: CONTINUOUS

## 2021-03-05 ASSESSMENT — PAIN SCALES - WONG BAKER: WONGBAKER_NUMERICALRESPONSE: 0

## 2021-03-05 ASSESSMENT — PAIN DESCRIPTION - PAIN TYPE: TYPE: ACUTE PAIN;SURGICAL PAIN

## 2021-03-05 ASSESSMENT — PAIN - FUNCTIONAL ASSESSMENT
PAIN_FUNCTIONAL_ASSESSMENT: ACTIVITIES ARE NOT PREVENTED
PAIN_FUNCTIONAL_ASSESSMENT: ACTIVITIES ARE NOT PREVENTED

## 2021-03-05 ASSESSMENT — PAIN DESCRIPTION - DESCRIPTORS: DESCRIPTORS: ACHING;DISCOMFORT;SORE

## 2021-03-05 NOTE — PROGRESS NOTES
Physical Therapy    [place]/Department[place] [facility]      NAME: Link Magallanes  : 1950  MRN: [id number]    Date of Service: 3/5/2021    Evaluating Therapist: Carline Davidson DPT    ROOM: [address] [place] [address]  DIAGNOSIS: History of revision of total knee arthroplasty   PMH: Pt had elective surgery for revision of left total knee arthroplasty with explantation of previous femoral and tibial component and revision of both the femoral and entire tibial component on 21. PMH includes DJD, HLD, Essential HTN, OA, Anxiety, Chronic Back Pain, Liver Disease, Obesity, Tricuspid Regurgitation    PRECAUTIONS: TTWB LLE, L Hinged Knee Brace (0-30 AROM per VO (Dr. Darrell Gutierrez 21)    Social:  Pt lives with spouse in a [facility] home with 3 stairs to enter with single rail (R HR). Equipment Owned: [employer] and [employer]. Pt ambulated with no device independently PTA. Initial Evaluation  21 AM   PM Short Term Goals Long Term Goals    Was pt agreeable to Eval/treatment? Yes yes Yes      Does pt have pain?  L knee pain 6/10 Mild  L knee pain  L knee pain      Bed Mobility  Rolling: SBA  Supine to sit: Rachel (LLE support)  Sit to supine: Rachel (LLE support)  Scooting: SBA Using a sheet for L LE management with bed mobility  Supine to sit mod I   Sit to supine mod  I Using sheet for L LE management   Mod I with all aspects of bed  Sup Mod I   Transfers Sit to stand: Rachel  Stand to sit: cgA  Stand pivot: cgA/Rachel with bariatric ww Sit to stand mod I   Stand to sit mod I   Stand pivot mod I   with bariatric ww Sit to stand mod I   Stand to sit mod I   Stand pivot mod I   with bariatric ww Sup with AAD Mod I with AAD   Ambulation   15 feet with bariatric ww with cgA/Rachel 75 feet x2  with bariatric ww with mod I 75 feet  with bariatric ww mod I 50 feet with AAD with Sup 150 feet with AAD with Mod I   Walking 10 feet on uneven surface NT N/T NT     Wheel Chair Mobility 15 feet with B UE and SBA NT NT >150 feet with B UE with Sup  >300 feet with B UE with Mod I   Car Transfers NT N/T Min A with L LE management   Sup with AAD Mod I with AAD   Stair negotiation: ascended and descended                     NT NT 6 inch step with ww x5 reps  2 steps with B rail with Sup 4 steps with 1 rail and AAD with Mod I   Curb Step:   ascended and descended 2 inch step with // bars and cg/Rachel (ascending BW) NT NT 4 inch step with AAD and Sup 4 inch step with AAD and Mod I   Picking up object off the floor NT       BLE ROM WFL       BLE Strength R LE 4/5  L LE: grossly  3-/5 (hip & knee),   3+/5 (ankle) (limited by pain)       Balance  Sitting: Independent  Standing: cgA with ww Standing CGA      Date Family Teach Completed TBD  Daughter 3/5/2021     Is additional Family Teaching Needed? Y or N Y  No      Hindering Progress L LE weakness, Balance, TTWB, Stairs Balance weakness      PT recommended ELOS 2 weeks       Team's Discharge Plan        Therapist at Team Meeting            Pt performed therapeutic exercise of the following:  AM  Seated exercises with R LE only 4# weights   Ankle pumps ( x30) LAQs ( 2x15)  Marching ( 2x15)   Supine exercises   L LE hip abduction 2x15  R LE hip abduction 2x15  R heel slides 2x15    Patient education  Pt was educated on technique with stair negotiation     Patient response to education:   Pt verbalized understanding Pt demonstrated skill Pt requires further education in this area   x X with verbal cues /assistance  x     Additional Comments: Pt completed functional mobility as noted above. Pt completed exercises as noted above. Adjusted L knee hinged brace for appropriate fit . Educated pt on proper alignment and fit. Pt with good understanding of education. PM, Daughter attended therapy for training. Pt completed functional mobility as noted above. Daughter with good understanding of pt's restrictions and how to assist pt as needed. Instructed daughter on proper alignment of hinged brace and how to adjust it.   Pt will ascend porch step  Backwards and using ww. Wc will be on porch and pt will sit in wc. Pt will ambulate to front door where the wc will be inside home and pt johann back up to door and sit in wc. Daughter and pt with good understanding of stair negotiation. Completed car transfer using pt's vehicle. Daughter and pt with good understanding of technique with car transfer. Time in: 1000  Time out: 1045    Time in: 1345  Time out: 0    Pt is making fair progress toward established Physical Therapy goals. Continue with physical therapy current plan of care.     Sorin Hough Number: [id number]

## 2021-03-05 NOTE — CARE COORDINATION
Per team meeting:  D/c 3/9. Updated patient and daughter Justine Chou. Patient made good progress. She is toe touch weightbearing on left lower extremity. All goals are for Mod I to I except Sup in bathing. DME patient has wheeled walker, extended tub bench. [facility] - to provide wheelchair and 3 in 1 commode. Notified [facility] for Home PT order. The Plan for Transition of Care is related to the following treatment goals: home  The Patient and/or patient representative daughter Justine Chou was provided with a choice of provider and agrees   with the discharge plan. [x] Yes [] No    Freedom of choice list was provided with basic dialogue that supports the patient's individualized plan of care/goals, treatment preferences and shares the quality data associated with the providers.  [x] Yes [] No    Faustina Barragan

## 2021-03-05 NOTE — PATIENT CARE CONFERENCE
[address] [place], [place] [address] [place]  INPATIENT ACUTE REHABILITATION  TEAM CONFERENCE NOTE/PATIENT PLAN OF CARE    Date: 3/5/2021  Admission date: 2021  Patient Name: Lata Jorgensen        MRN: [id number]    : 1950  (71 y.o.)  Gender: female   Rehab diagnosis/surgery with date:  Left knee revision, arthroplasty 21 due to osteoarthritis    Impairment Group Code:  8.61      MEDICAL/FUNCTIONAL HISTORY/STATUS:  doing well, making gains in therapy, toe touch wt. bearing left lower, hinged brace to left lower    Consultations/Labs/X-rays: none recent      MEDICATION UPDATE:  no recent changes, aspirin for DVT prophylaxis      NURSING :    Bowel:   Always Continent  [x]   Occasionally incontinent  []   Frequently incontinent  []   Always incontinent  []   Not occurred  []     Bladder:   Always Continent  [x]    Incontinent less than daily[]   Incontinent  daily []   Always incontinent  []   No urine output    []   Indwelling catheter []       Toilet Hygiene:   Current level : Contact guard assist  Short term Toilet hygiene goal: supervision  Long term toilet hygiene goal:  supervision    Skin integrity: left knee staples intact  Pain: left knee pain, OXY-IR effective    NUTRITION    Diet  general  Liquid consistency   thin    SOCIAL INFORMATION:  Lives with: spouse  Prior community services:  none  Home Architecture:  [facility] 3 with entry 1 rail, 12 down to walk in shower in basement  Prior Level of function:  independent, retired  DME:  straight cane ,wheeled walker, elevated toilet seat    FAMILY / PATIENT EDUCATION:  family ed today with  daughter    PHYSICAL THERAPY    Bed mobility:   Current level: Modified independent  Short term bed mobility goal: Modified independent  Long term bed mobility goal: Modified independent    Chair/bed transfers:  Current level: sit to stand Modified independent, pivots are supervision with bariatric wheeled walker  and toe touch wt. bearing left leg  Short term Chair/bed transfers goal: met  Long term Chair/bed transfers goal: Modified independent      Ambulation:   Current level: 75 ft with a bariatric  wheeled walker at supervision  Short term ambulation goal: met  Long term ambulation goal: 150 ft  at Modified independent      Car transfers:   Current level: not yet done, will do in own car today    Stairs:   Current level : 3 with 2 rails at min assist  Short term stairs goal: 2 at supervision  Long term stairs goal: Modified independent        OCCUPATIONAL THERAPY:      Tub/shower:   Current level: refused due to brace      Feeding:  Current level: independent  Short term feeding goal: independent  Long term feeding goal: independent    Grooming:   Current level: Modified independent  Short term grooming goal: Modified independent  Long term grooming goal: Modified independent    Bathing:  Current level: standby assist  Short term bathing goal: standby assist  Long term bathing goal: standby assist    Homemaking:   Current level: min assist  Short term homemaking goal: supervision  Long term homemaking goal: Modified independent    Upper body dressing:  Current level: independent  Short term upper body dressing goal: independent  Long term upper body dressing goal: independent    Lower body dressing:                                                                          Current level: standby assist with adaptive equipment            Short term lower body dressing goal: Modified independent          Long term lower body dressing goal: Modified independent              Toilet transfer:   Current level: supervision  Short term toilet transfer goal: Modified independent  Long term toilet transfer goal: Modified independent      Patient/family's personal goals: \"go home and take care of myself\"  Factors supporting goal achievement:  motivated, making gains  Factors hindering goal achievement:  morbid obesity, brace, wt. bearing restriction      Discharge Plan   Estimated Length of Stay: 3/9/21            Destination: home  Services at Discharge: home health  for PT  Equipment at Discharge: has all, will benefit from wheelchair      INTERDISCIPLINARY TEAM/PHYSICIAN [address]:  family education today      I approve the established interdisciplinary plan of care as documented within the medical record of Link Magallanes. Electronically signed by Jose Carlos Galeano RN  on 3/5/2021 at 8:31 AM  The following interdisciplinary team members were present:  Rosanna Silva RN  [facility], [facility],LSW  Delroy Li.  [place], DPT  Elmer Graham, OTR

## 2021-03-05 NOTE — PROGRESS NOTES
Progress Note    Subjective/   65y.o. year old female on the rehab unit for revision arthroplasty on the left. No new complaints. Tolerating therapy program.  She is seeing continued progress. .           Objective/   VITALS:  /60   Pulse 79   Temp 97.5 °F (36.4 °C) (Oral)   Resp 13   Ht 5' 5\" (1.651 m)   Wt 260 lb (117.9 kg)   SpO2 99%   BMI 43.27 kg/m²   24HR INTAKE/OUTPUT:      Intake/Output Summary (Last 24 hours) at 3/5/2021 0834  Last data filed at 3/4/2021 1730  Gross per 24 hour   Intake 540 ml   Output --   Net 540 ml     Constitutional:  Alert, awake, no apparent distress   Cardiovascular:  S1, S2 without m/r/g   Respiratory:  CTA B without w/r/r   Abdomen: Positive bowel sounds  Ext: Alignment good. Minimal left LE edema  Neuro: Awake, alert and oriented x3. Light touch intact. Functional Level    Initial Evaluation  2/25/21 AM   PM Short Term Goals Long Term Goals    Was pt agreeable to Eval/treatment? Yes yes Yes        Does pt have pain?  L knee pain 6/10 Mild  L knee pain  L knee pain        Bed Mobility  Rolling: SBA  Supine to sit: Rachel (LLE support)  Sit to supine: Rachel (LLE support)  Scooting: SBA Using a sheet for L LE management with bed mobility  Supine to sit mod I   Sit to supine mod  I Using sheet for L LE management   Mod I with all aspects of bed  Sup Mod I   Transfers Sit to stand: Rachel  Stand to sit: cgA  Stand pivot: cgA/Rachel with bariatric ww Sit to stand mod I   Stand to sit mod I   Stand pivot mod I   with bariatric ww Sit to stand mod I   Stand to sit mod I   Stand pivot mod I   with bariatric ww Sup with AAD Mod I with AAD   Ambulation   15 feet with bariatric ww with cgA/Rachel 75 feet x2  with bariatric ww with mod I 75 feet  with bariatric ww mod I 50 feet with AAD with Sup 150 feet with AAD with Mod I   Walking 10 feet on uneven surface NT N/T NT       Wheel Chair Mobility 15 feet with B UE and SBA NT NT >150 feet with B UE with Sup  >300 feet with B UE with Mod I   Car Transfers NT N/T Min A with L LE management   Sup with AAD Mod I with AAD   Stair negotiation: ascended and descended                     NT NT 6 inch step with ww x5 reps  2 steps with B rail with Sup 4 steps with 1 rail and AAD with Mod I   Curb Step:   ascended and descended 2 inch step with // bars and cg/Rachel (ascending BW) NT NT 4 inch step with AAD and Sup 4 inch step with AAD and Mod I   Picking up object off the floor NT           BLE ROM WFL           BLE Strength R LE 4/5  L LE: grossly  3-/5 (hip & knee),   3+/5 (ankle) (limited by pain)           Balance  Sitting: Independent  Standing: cgA with ww Standing CGA               Scheduled Meds:   amLODIPine  5 mg Oral Daily    simvastatin  40 mg Oral Nightly    acetaminophen  650 mg Oral Q6H    aspirin  325 mg Oral BID    sennosides-docusate sodium  1 tablet Oral BID    sodium chloride flush  10 mL Intravenous 2 times per day    buPROPion  300 mg Oral QAM     Continuous Infusions:  PRN Meds:hydrALAZINE, lactulose, magnesium hydroxide, ondansetron **OR** [DISCONTINUED] ondansetron, oxyCODONE **OR** oxyCODONE, sodium chloride flush, polyethylene glycol  I/O last 3 completed shifts: In: 540 [P.O.:540]  Out: -   No intake/output data recorded. Labs reviewed  CBC: No results for input(s): WBC, HGB, PLT in the last 72 hours. BMP:  No results for input(s): NA, K, CL, CO2, BUN, CREATININE, GLUCOSE in the last 72 hours. Hepatic: No results for input(s): AST, ALT, ALB, BILITOT, ALKPHOS in the last 72 hours. BNP: No results for input(s): BNP in the last 72 hours. Lipids: No results for input(s): CHOL, HDL in the last 72 hours. Invalid input(s): LDLCALCU  INR: No results for input(s): INR in the last 72 hours.     Assessment/  Patient Active Problem List:     Abnormal nuclear stress test     Essential hypertension     Primary osteoarthritis of left knee     Hyperlipidemia     Fatty liver     Anxiety     Vitamin D deficiency     Class 3

## 2021-03-05 NOTE — PROGRESS NOTES
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 Walker. Oral Care 3/3/21 Mod I seated    Bathing 3/3/21 SBA     UB Dressing 3/3/21 Independent     LB Dressing 3/3/21 SBA/S LB AE    Footwear 3/4/21 Supervision LB AE    Toileting 3/5/21 SBA/S bariatric ww  Grab bar Pt completed toilet hygiene/clothing mgmt using w. Walker and grab bar for balance with some unsteadiness and Dtr. Educated with patient's skills in pm session for Family teach. Homemaking 3/5/21 CGA/min A W/c level   reacher Pt engaged in item retrieval from fridge and pantry at w/c level for safety. Dtr present to observe patient's skills during pm Family teach session. Discussed options to safely transport items along with placing lightweight items within reach or on countertop for patient ability to access while in w/c. Functional Transfers / Balance:   Date Status DME  Comments   Sit Balance 3/5/21 Mod I  Seated on commode and up in w/c along with firm recliner. Stand Balance 3/5/21 SBA/S Grab bar  Bariatric ww Demo during clothing mgmt/toilet hygiene, ambulation in OT apt and room along with transfers. [] Tub  [] Shower   Transfer  TBA- See Comments dated on 3/4/21 note. Commode   Transfer 3/5/21 SBA/S Grab bar,  Bariatric 3-in-1 PM: Pt used bariatric w. Walker from w/c <>commode with Dtr. Present during family teach to observe patient's skills. Functional   Mobility 3/5/21 S/SBA Bariatric w. Walker  PM: Pt completed fxl mobility using bariatric w.  Walker following TTWB LLE including over carpeted area and Dtr. Present. Other:  Sit<>Stand:     Firm recliner and queen bed    3/5/21      3/5/21   SBA/S      SBA/CGA Bariatric w. Walker    PM: For safety on/off firm recliner and queen size bed. PM:Pt needed CGA for LLE mgmt in/out of queen size bed using sheet for LLE. Functional Exercises / Activity:  Family teach session during pm session to address functional transfers on/off firm recliner, queen size bed, bariatric 3:1 commode including clothing mgmt/toilet hygiene,fxl mobility using bariatric w. Walker and w/c level hmkg/kitchen mobility skills and Dtr. Present. Sensory / Neuromuscular Re-Education:      Cognitive Skills:   Status Comments   Problem   Solving good     Memory good     Sequencing fair +    Safety fair       Visual Perception:    Education:  Dtr./pt educated with during pm Family teach in regards to functional transfer training while following TTWB LLE precautions. [x] Family teach completed on:  3/5/21 Dtr. Present for Family Teach and educated with functional transfers on/off queen size bed, bariatric 3:1 commode, firm recliner, fxl mobility using bariatric w. Walker and w/c level kitchen mobility training. Pt will be getting bariatric 3:1 device for home and educated Dtr. on how to adjust legs and remove back bar if necessary to promote fit at home. Pt educated on how to remove elevated leg rest then place back on and Dtr.had hands on training with leg rests. Pain Level:2-3/10 L LE. Additional Notes:  3/3/21: Pt issued LB AE this date for increased independence with ADL tasks. Patient has made good  progress during treatment sessions toward set goals.  Therapy emphasis to obtain goals:   Current Treatment Recommendations: Strengthening, Endurance Training, Patient/Caregiver Education & Training, Equipment Evaluation, Education, & procurement, Self-Care / ADL, Balance Training, Gait Training, Home Management Training, Functional Mobility Training, Safety Education & Training    Long term goal 1: Pt demo independent to eat all meals  Long term goal 2: Pt demo Mod I grooming seated or standing  Long term goal 3: Pt demo SBA to sponge bathe both seated & standing  Long term goal 4: Pt demo I UE & Mod I LE dressinculding underwear, pants, socks & shoes using AE as needed  Long term goal 5: Pt demo Mod I commode trf & Mod I toileting & use approrpaite DME to ensure pt safety  Long term goals 6: Pt demo SBA walk in shower trf using appropraite DME to ensurept safety  Long term goal 7: Pt demo Mod I light homemaking activity & demo G- safety & insight  Long term goal 8: Pt demo G- endurance for a 20-30 minute functional activity  Long term goal 9: Pt demo independent to complete a BUE strengtheing home exercise program    [x] Continue with current OT Plan of care.   [] Prepare for Discharge     DISCHARGE RECOMMENDATIONS  Recommended DME: LB AE, bariatric 3-in-1 commode, LB AE(issued)    Post Discharge Care:   []Home Independently  []Home with 24hr Care / Supervision [x]Home with Partial Supervision []Home with Home Health OT []Home with Out Pt OT []Other: ___   Comments:         Time in Time out Tx Time Breakdown  Variance:   First Session    [] Individual Tx- Mins  [] Concurrent Tx -  [] Co-Tx -   [] Group Tx -   [] Time Missed -     Second Session 1300pm 1345pm [x] Individual Tx-45 Mins  [] Concurrent Tx -  [] Co-Tx -   [] Group Tx -   [] Time Missed -     Third Session    [] Individual Tx-   [] Concurrent Tx -  [] Co-Tx -   [] Group Tx -   [] Time Missed -         Total Tx Time 45 Mins     Rena BELTRÁN/L [id number]

## 2021-03-06 PROCEDURE — 97110 THERAPEUTIC EXERCISES: CPT

## 2021-03-06 PROCEDURE — 6370000000 HC RX 637 (ALT 250 FOR IP): Performed by: STUDENT IN AN ORGANIZED HEALTH CARE EDUCATION/TRAINING PROGRAM

## 2021-03-06 PROCEDURE — 6370000000 HC RX 637 (ALT 250 FOR IP): Performed by: PHYSICAL MEDICINE & REHABILITATION

## 2021-03-06 PROCEDURE — 1280000000 HC REHAB R&B

## 2021-03-06 RX ADMIN — ACETAMINOPHEN 650 MG: 325 TABLET ORAL at 16:39

## 2021-03-06 RX ADMIN — DOCUSATE SODIUM 50 MG AND SENNOSIDES 8.6 MG 1 TABLET: 8.6; 5 TABLET, FILM COATED ORAL at 08:52

## 2021-03-06 RX ADMIN — ACETAMINOPHEN 650 MG: 325 TABLET ORAL at 10:39

## 2021-03-06 RX ADMIN — ASPIRIN 325 MG: 325 TABLET, COATED ORAL at 20:41

## 2021-03-06 RX ADMIN — ASPIRIN 325 MG: 325 TABLET, COATED ORAL at 08:53

## 2021-03-06 RX ADMIN — AMLODIPINE BESYLATE 5 MG: 5 TABLET ORAL at 08:52

## 2021-03-06 RX ADMIN — Medication 40 MG: at 20:41

## 2021-03-06 RX ADMIN — OXYCODONE HYDROCHLORIDE 10 MG: 10 TABLET ORAL at 06:19

## 2021-03-06 RX ADMIN — ACETAMINOPHEN 650 MG: 325 TABLET ORAL at 05:00

## 2021-03-06 RX ADMIN — ACETAMINOPHEN 650 MG: 325 TABLET ORAL at 23:25

## 2021-03-06 RX ADMIN — BUPROPION HYDROCHLORIDE 300 MG: 300 TABLET, FILM COATED, EXTENDED RELEASE ORAL at 08:52

## 2021-03-06 RX ADMIN — OXYCODONE HYDROCHLORIDE 10 MG: 10 TABLET ORAL at 20:41

## 2021-03-06 RX ADMIN — DOCUSATE SODIUM 50 MG AND SENNOSIDES 8.6 MG 1 TABLET: 8.6; 5 TABLET, FILM COATED ORAL at 20:41

## 2021-03-06 ASSESSMENT — PAIN DESCRIPTION - PROGRESSION
CLINICAL_PROGRESSION: NOT CHANGED
CLINICAL_PROGRESSION: NOT CHANGED

## 2021-03-06 ASSESSMENT — PAIN SCALES - GENERAL
PAINLEVEL_OUTOF10: 5
PAINLEVEL_OUTOF10: 7
PAINLEVEL_OUTOF10: 5

## 2021-03-06 ASSESSMENT — PAIN DESCRIPTION - PAIN TYPE
TYPE: ACUTE PAIN;SURGICAL PAIN
TYPE: ACUTE PAIN
TYPE: ACUTE PAIN

## 2021-03-06 ASSESSMENT — PAIN DESCRIPTION - DESCRIPTORS
DESCRIPTORS: ACHING;DISCOMFORT
DESCRIPTORS: ACHING;CONSTANT;DISCOMFORT

## 2021-03-06 ASSESSMENT — PAIN SCALES - WONG BAKER: WONGBAKER_NUMERICALRESPONSE: 4

## 2021-03-06 ASSESSMENT — PAIN DESCRIPTION - ORIENTATION: ORIENTATION: LEFT

## 2021-03-06 ASSESSMENT — PAIN DESCRIPTION - LOCATION: LOCATION: LEG

## 2021-03-06 ASSESSMENT — PAIN DESCRIPTION - ONSET: ONSET: ON-GOING

## 2021-03-06 NOTE — PROGRESS NOTES
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    Precautions: Fall risk, L LE TTWB & [place] brace 0-30*    Functional Assessment:   Date Status AE  Comments   Feeding 3/3/21 Independent     Grooming 3/5/21 Mod I standing          Oral Care 3/3/21 Mod I seated    Bathing 3/3/21 SBA     UB Dressing 3/3/21 Independent     LB Dressing 3/3/21 SBA/S LB AE    Footwear 3/4/21 Supervision LB AE    Toileting 3/5/21 SBA/S bariatric ww  Grab bar    Homemaking 3/5/21 CGA/min A W/c level   reacher      Functional Transfers / Balance:   Date Status DME  Comments   Sit Balance 3/5/21 Mod I     Stand Balance 3/5/21 SBA/S Grab bar  Bariatric ww    [] Tub  [] Shower   Transfer  TBA- See Comments dated on 3/4/21 note. Commode   Transfer 3/5/21 SBA/S Grab bar,  Bariatric 3-in-1    Functional   Mobility 3/5/21 S/SBA Bariatric w. Walker     Other:  Sit<>Stand:     Firm recliner and queen bed    3/5/21      3/5/21   SBA/S      SBA/CGA Bariatric w. Walker       Functional Exercises / Activity:  Mod resistive conner bar X 25 reps on 2 planes to increase BUE forearm, wrist and  strength for independence with functional tasks, transfers and mobility. BUE strengthening exercises 3 X 10 to increase strength for independence with ADLS an transfers  -3# free weight shoulder flexion 1 X 10  -2# free weight BUE shoulder flexion 2 X 10, scapular protraction/retraction 3 X 10, elbow flexion 3 X 10, Supination/pronation 3 X 10, wrist flexion/extension 3 X 10     Sensory / Neuromuscular Re-Education:      Cognitive Skills:   Status Comments   Problem   Solving good     Memory good     Sequencing fair +    Safety fair       Visual Perception:    Education:  Pt educated on strengthening exercises for proper technique and safety- good carryover of instructions.      [x] Family teach completed on:  3/5/21 Dtr. Present for Family Teach and educated with functional transfers on/off queen size bed, bariatric 3:1 commode, firm recliner, fxl mobility using bariatric w. Walker and w/c level kitchen mobility training. Pt will be getting bariatric 3:1 device for home and educated Dtr. on how to adjust legs and remove back bar if necessary to promote fit at home. Pt educated on how to remove elevated leg rest then place back on and Dtr.had hands on training with leg rests. Pain Level: 5/10 c/o LLE pain    Additional Notes:  3/3/21: Pt issued LB AE this date for increased independence with ADL tasks. Patient has made good  progress during treatment sessions toward set goals. Therapy emphasis to obtain goals:   Current Treatment Recommendations: Strengthening, Endurance Training, Patient/Caregiver Education & Training, Equipment Evaluation, Education, & procurement, Self-Care / ADL, Balance Training, Gait Training, Home Management Training, Functional Mobility Training, Safety Education & Training    Long term goal 1: Pt demo independent to eat all meals  Long term goal 2: Pt demo Mod I grooming seated or standing  Long term goal 3: Pt demo SBA to sponge bathe both seated & standing  Long term goal 4: Pt demo I UE & Mod I LE dressinculding underwear, pants, socks & shoes using AE as needed  Long term goal 5: Pt demo Mod I commode trf & Mod I toileting & use approrpaite DME to ensure pt safety  Long term goals 6: Pt demo SBA walk in shower trf using appropraite DME to ensurept safety  Long term goal 7: Pt demo Mod I light homemaking activity & demo G- safety & insight  Long term goal 8: Pt demo G- endurance for a 20-30 minute functional activity  Long term goal 9: Pt demo independent to complete a BUE strengtheing home exercise program    [x] Continue with current OT Plan of care.   [] Prepare for Discharge     DISCHARGE RECOMMENDATIONS  Recommended DME: LB AE, bariatric 3-in-1 commode, LB AE(issued)    Post Discharge Care:   []Home Independently  []Home with 24hr Care / Supervision [x]Home with Partial Supervision []Home with Home Health OT []Home with Out Pt OT []Other: ___   Comments:         Time in Time out Tx Time Breakdown  Variance:   First Session  9:30 10:00 [x] Individual Tx- 30 Mins  [] Concurrent Tx -  [] Co-Tx -   [] Group Tx -   [] Time Missed -     Second Session   [] Individual Tx-  [] Concurrent Tx -  [] Co-Tx -   [] Group Tx -   [] Time Missed -     Third Session    [] Individual Tx-   [] Concurrent Tx -  [] Co-Tx -   [] Group Tx -   [] Time Missed -         Total Tx Time 30 Mins     Adolfo BELTRÁN/JORY [id number]

## 2021-03-07 PROCEDURE — 97110 THERAPEUTIC EXERCISES: CPT

## 2021-03-07 PROCEDURE — 1280000000 HC REHAB R&B

## 2021-03-07 PROCEDURE — 97530 THERAPEUTIC ACTIVITIES: CPT

## 2021-03-07 PROCEDURE — 6370000000 HC RX 637 (ALT 250 FOR IP): Performed by: STUDENT IN AN ORGANIZED HEALTH CARE EDUCATION/TRAINING PROGRAM

## 2021-03-07 PROCEDURE — 6370000000 HC RX 637 (ALT 250 FOR IP): Performed by: PHYSICAL MEDICINE & REHABILITATION

## 2021-03-07 RX ADMIN — OXYCODONE HYDROCHLORIDE 10 MG: 10 TABLET ORAL at 07:35

## 2021-03-07 RX ADMIN — ACETAMINOPHEN 650 MG: 325 TABLET ORAL at 11:31

## 2021-03-07 RX ADMIN — Medication 40 MG: at 20:21

## 2021-03-07 RX ADMIN — ASPIRIN 325 MG: 325 TABLET, COATED ORAL at 07:36

## 2021-03-07 RX ADMIN — OXYCODONE HYDROCHLORIDE 10 MG: 10 TABLET ORAL at 20:29

## 2021-03-07 RX ADMIN — BUPROPION HYDROCHLORIDE 300 MG: 300 TABLET, FILM COATED, EXTENDED RELEASE ORAL at 07:36

## 2021-03-07 RX ADMIN — AMLODIPINE BESYLATE 5 MG: 5 TABLET ORAL at 07:36

## 2021-03-07 RX ADMIN — DOCUSATE SODIUM 50 MG AND SENNOSIDES 8.6 MG 1 TABLET: 8.6; 5 TABLET, FILM COATED ORAL at 07:35

## 2021-03-07 RX ADMIN — ACETAMINOPHEN 650 MG: 325 TABLET ORAL at 22:34

## 2021-03-07 RX ADMIN — ASPIRIN 325 MG: 325 TABLET, COATED ORAL at 20:21

## 2021-03-07 RX ADMIN — ACETAMINOPHEN 650 MG: 325 TABLET ORAL at 06:32

## 2021-03-07 RX ADMIN — ACETAMINOPHEN 650 MG: 325 TABLET ORAL at 16:40

## 2021-03-07 RX ADMIN — DOCUSATE SODIUM 50 MG AND SENNOSIDES 8.6 MG 1 TABLET: 8.6; 5 TABLET, FILM COATED ORAL at 20:21

## 2021-03-07 ASSESSMENT — PAIN SCALES - GENERAL
PAINLEVEL_OUTOF10: 3
PAINLEVEL_OUTOF10: 3
PAINLEVEL_OUTOF10: 6
PAINLEVEL_OUTOF10: 4
PAINLEVEL_OUTOF10: 6

## 2021-03-07 ASSESSMENT — PAIN DESCRIPTION - PROGRESSION
CLINICAL_PROGRESSION: NOT CHANGED
CLINICAL_PROGRESSION: GRADUALLY IMPROVING
CLINICAL_PROGRESSION: NOT CHANGED

## 2021-03-07 ASSESSMENT — PAIN SCALES - WONG BAKER: WONGBAKER_NUMERICALRESPONSE: 4

## 2021-03-07 ASSESSMENT — PAIN - FUNCTIONAL ASSESSMENT: PAIN_FUNCTIONAL_ASSESSMENT: ACTIVITIES ARE NOT PREVENTED

## 2021-03-07 ASSESSMENT — PAIN DESCRIPTION - FREQUENCY
FREQUENCY: INTERMITTENT

## 2021-03-07 ASSESSMENT — PAIN DESCRIPTION - PAIN TYPE: TYPE: SURGICAL PAIN;ACUTE PAIN

## 2021-03-07 ASSESSMENT — PAIN DESCRIPTION - DESCRIPTORS: DESCRIPTORS: ACHING;DISCOMFORT

## 2021-03-07 ASSESSMENT — PAIN DESCRIPTION - ONSET: ONSET: ON-GOING

## 2021-03-07 NOTE — PROGRESS NOTES
Progress Note    Subjective/   65y.o. year old female on the rehab unit for revision left knee arthroplasty. No new complaints. No headache. No nausea or vomiting. Tolerating therapy. .           Objective/   VITALS:  BP (!) 159/70   Pulse 86   Temp 98.2 °F (36.8 °C) (Temporal)   Resp 18   Ht 5' 5\" (1.651 m)   Wt 260 lb (117.9 kg)   SpO2 97%   BMI 43.27 kg/m²   24HR INTAKE/OUTPUT:      Intake/Output Summary (Last 24 hours) at 3/6/2021 2150  Last data filed at 3/6/2021 1846  Gross per 24 hour   Intake 600 ml   Output --   Net 600 ml     Constitutional:  Alert, awake, no apparent distress   Cardiovascular:  S1, S2 without m/r/g   Respiratory:  CTA B without w/r/r   Abdomen: Positive bowel sounds, no tenderness  Ext: Alignment good. Minimal left LE edema. No calf tenderness  Neuro: Light touch intact. No weakness in the upper extremities. Functional Level      Date   Status   AE    Comments     Feeding   3/3/21   Independent             Grooming   3/5/21   Mod I   standing               Oral Care   3/3/21   Mod I   seated         Bathing   3/3/21   SBA             UB Dressing   3/3/21   Independent             LB Dressing   3/3/21   SBA/S   LB AE         Footwear   3/4/21   Supervision   LB AE         Toileting   3/5/21   SBA/S   bariatric ww    Grab bar       Homemaking   3/5/21   CGA/min A   W/c level     reacher            Functional Transfers / Balance:      Date Status DME  Comments   Sit Balance 3/5/21   Mod I       Stand Balance 3/5/21   SBA/S Grab bar  Bariatric ww     []? Tub  []? Shower   Transfer     TBA- See Comments dated on 3/4/21 note.        Commode   Transfer 3/5/21   SBA/S Grab bar,  Bariatric 3-in-1     Functional   Mobility 3/5/21   S/SBA Bariatric w. Walker      Other:  Sit<>Stand:      Firm recliner and queen bed     3/5/21        3/5/21    SBA/S        SBA/CGA Bariatric w.  Walker         Functional Exercises / Activity:  Mod resistive conner bar X 25 reps on 2 planes to increase BUE forearm, wrist and  strength for independence with functional tasks, transfers and mobility.      BUE strengthening exercises 3 X 10 to increase strength for independence with ADLS an transfers  -3# free weight shoulder flexion 1 X 10  -2# free weight BUE shoulder flexion 2 X 10, scapular protraction/retraction 3 X 10, elbow flexion 3 X 10, Supination/pronation 3 X 10, wrist flexion/extension 3 X 10      Sensory / Neuromuscular Re-Education:        Cognitive Skills:    Status Comments   Problem   Solving good      Memory good      Sequencing fair +     Safety fair             Scheduled Meds:   amLODIPine  5 mg Oral Daily    simvastatin  40 mg Oral Nightly    acetaminophen  650 mg Oral Q6H    aspirin  325 mg Oral BID    sennosides-docusate sodium  1 tablet Oral BID    sodium chloride flush  10 mL Intravenous 2 times per day    buPROPion  300 mg Oral QAM     Continuous Infusions:  PRN Meds:hydrALAZINE, lactulose, magnesium hydroxide, ondansetron **OR** [DISCONTINUED] ondansetron, oxyCODONE **OR** oxyCODONE, sodium chloride flush, polyethylene glycol  I/O last 3 completed shifts: In: 600 [P.O.:600]  Out: -   I/O this shift:  In: 240 [P.O.:240]  Out: -     Labs reviewed  CBC: No results for input(s): WBC, HGB, PLT in the last 72 hours. BMP:  No results for input(s): NA, K, CL, CO2, BUN, CREATININE, GLUCOSE in the last 72 hours. Hepatic: No results for input(s): AST, ALT, ALB, BILITOT, ALKPHOS in the last 72 hours. BNP: No results for input(s): BNP in the last 72 hours. Lipids: No results for input(s): CHOL, HDL in the last 72 hours. Invalid input(s): LDLCALCU  INR: No results for input(s): INR in the last 72 hours.     Assessment/  Patient Active Problem List:     Abnormal nuclear stress test     Essential hypertension     Primary osteoarthritis of left knee     Hyperlipidemia     Fatty liver     Anxiety     Vitamin D deficiency     Class 3 severe obesity due to excess calories with serious comorbidity and body mass index (BMI) of 40.0 to 44.9 in adult [facility])     EKG abnormalities     Carotid stenosis, left     Pre-diabetes     Varicose veins of both lower extremities with pain     Chronic right-sided low back pain without sciatica     COVID-19     History of revision of total knee arthroplasty      Plan/  1. Continue rehab program with focus on improved mobility and reduced care burden  2. Continue pain control  3. Continue DVT prophylaxis  4. Continue antihypertensives and adjust as needed  5.  Maintain precautions per Marya Luis MD

## 2021-03-07 NOTE — PROGRESS NOTES
descend fwd  2 steps with B rail with Sup 4 steps with 1 rail and AAD with Mod I   Curb Step:   ascended and descended 2 inch step with // bars and cg/Rachel (ascending BW) NT  4 inch step with AAD and Sup 4 inch step with AAD and Mod I   Picking up object off the floor NT       BLE ROM WFL       BLE Strength R LE 4/5  L LE: grossly  3-/5 (hip & knee),   3+/5 (ankle) (limited by pain)       Balance  Sitting: Independent  Standing: cgA with ww Standing SB-mod I      Date Family Teach Completed TBD       Is additional Family Teaching Needed? Y or N Y       Hindering Progress L LE weakness, Balance, TTWB, Stairs Balance weakness      PT recommended ELOS 2 weeks       Team's Discharge Plan        Therapist at Team Meeting            Pt performed therapeutic exercise of the following:  AM  Seated exercises with R LE only 4# weights   Ankle pumps ( x30) LAQs ( 2x15)  Marching ( 2x15)     Patient education  Pt was educated on technique with stair negotiation     Patient response to education:   Pt verbalized understanding Pt demonstrated skill Pt requires further education in this area   x X with verbal cues /assistance  x     Additional Comments: Pt completed functional mobility as noted above. Progressing toward goals    Time in: 915  Time out: 950    Continue with physical therapy current plan of care.     Alma Rivera PTA [id number]

## 2021-03-08 ENCOUNTER — APPOINTMENT (OUTPATIENT)
Dept: GENERAL RADIOLOGY | Age: 71
DRG: 560 | End: 2021-03-08
Attending: PHYSICAL MEDICINE & REHABILITATION
Payer: MEDICARE

## 2021-03-08 ENCOUNTER — TELEPHONE (OUTPATIENT)
Dept: ADMINISTRATIVE | Age: 71
End: 2021-03-08

## 2021-03-08 PROCEDURE — 97110 THERAPEUTIC EXERCISES: CPT

## 2021-03-08 PROCEDURE — 97535 SELF CARE MNGMENT TRAINING: CPT

## 2021-03-08 PROCEDURE — 1280000000 HC REHAB R&B

## 2021-03-08 PROCEDURE — 97530 THERAPEUTIC ACTIVITIES: CPT

## 2021-03-08 PROCEDURE — 6370000000 HC RX 637 (ALT 250 FOR IP): Performed by: PHYSICAL MEDICINE & REHABILITATION

## 2021-03-08 PROCEDURE — 6370000000 HC RX 637 (ALT 250 FOR IP): Performed by: STUDENT IN AN ORGANIZED HEALTH CARE EDUCATION/TRAINING PROGRAM

## 2021-03-08 PROCEDURE — 73560 X-RAY EXAM OF KNEE 1 OR 2: CPT

## 2021-03-08 PROCEDURE — 99024 POSTOP FOLLOW-UP VISIT: CPT | Performed by: PHYSICIAN ASSISTANT

## 2021-03-08 RX ADMIN — AMLODIPINE BESYLATE 5 MG: 5 TABLET ORAL at 08:24

## 2021-03-08 RX ADMIN — ACETAMINOPHEN 650 MG: 325 TABLET ORAL at 04:59

## 2021-03-08 RX ADMIN — OXYCODONE HYDROCHLORIDE 10 MG: 10 TABLET ORAL at 08:24

## 2021-03-08 RX ADMIN — ACETAMINOPHEN 650 MG: 325 TABLET ORAL at 11:22

## 2021-03-08 RX ADMIN — OXYCODONE HYDROCHLORIDE 10 MG: 10 TABLET ORAL at 18:15

## 2021-03-08 RX ADMIN — Medication 40 MG: at 21:21

## 2021-03-08 RX ADMIN — ASPIRIN 325 MG: 325 TABLET, COATED ORAL at 08:24

## 2021-03-08 RX ADMIN — OXYCODONE HYDROCHLORIDE 10 MG: 10 TABLET ORAL at 12:32

## 2021-03-08 RX ADMIN — ASPIRIN 325 MG: 325 TABLET, COATED ORAL at 21:21

## 2021-03-08 RX ADMIN — BUPROPION HYDROCHLORIDE 300 MG: 300 TABLET, FILM COATED, EXTENDED RELEASE ORAL at 08:24

## 2021-03-08 RX ADMIN — ACETAMINOPHEN 650 MG: 325 TABLET ORAL at 17:59

## 2021-03-08 RX ADMIN — DOCUSATE SODIUM 50 MG AND SENNOSIDES 8.6 MG 1 TABLET: 8.6; 5 TABLET, FILM COATED ORAL at 08:24

## 2021-03-08 RX ADMIN — DOCUSATE SODIUM 50 MG AND SENNOSIDES 8.6 MG 1 TABLET: 8.6; 5 TABLET, FILM COATED ORAL at 21:21

## 2021-03-08 ASSESSMENT — PAIN DESCRIPTION - PROGRESSION
CLINICAL_PROGRESSION: NOT CHANGED

## 2021-03-08 ASSESSMENT — PAIN DESCRIPTION - ORIENTATION
ORIENTATION: LEFT
ORIENTATION: LEFT

## 2021-03-08 ASSESSMENT — PAIN - FUNCTIONAL ASSESSMENT: PAIN_FUNCTIONAL_ASSESSMENT: ACTIVITIES ARE NOT PREVENTED

## 2021-03-08 ASSESSMENT — PAIN SCALES - GENERAL
PAINLEVEL_OUTOF10: 8
PAINLEVEL_OUTOF10: 7
PAINLEVEL_OUTOF10: 5
PAINLEVEL_OUTOF10: 3
PAINLEVEL_OUTOF10: 3
PAINLEVEL_OUTOF10: 7

## 2021-03-08 ASSESSMENT — PAIN DESCRIPTION - FREQUENCY
FREQUENCY: INTERMITTENT
FREQUENCY: INTERMITTENT

## 2021-03-08 ASSESSMENT — PAIN DESCRIPTION - DESCRIPTORS: DESCRIPTORS: ACHING;CONSTANT;DISCOMFORT

## 2021-03-08 ASSESSMENT — PAIN DESCRIPTION - ONSET: ONSET: ON-GOING

## 2021-03-08 ASSESSMENT — PAIN DESCRIPTION - PAIN TYPE: TYPE: SURGICAL PAIN

## 2021-03-08 ASSESSMENT — PAIN SCALES - WONG BAKER: WONGBAKER_NUMERICALRESPONSE: 4

## 2021-03-08 NOTE — TELEPHONE ENCOUNTER
Call returned to patient's daughter. Advised ortho will round on patient either this evening or tomorrow morning prior to discharging for a 2 wk post op check. Juan Luis Funez agreeable to plan. Discussed if doing well, patient can return to office for 6 week post op appt on 04/05/21 at 930. Juan Luis Dress verbalized understanding. Encouraged to call with questions or concerns. Discussed with Karen Weathers PA-C. She will see patient prior to discharge.     Future Appointments   Date Time Provider Henrietta Zhao   4/5/2021  9:30 AM SCHEDULE, [facility]   5/19/2021  9:15 AM Kelleen Lombard, MD [facility]   7/27/2021 10:00 AM DO DEVENDRA Hicks [facility]

## 2021-03-08 NOTE — TELEPHONE ENCOUNTER
Please call daughter Justine Fullerbenedict- info under contacts     Patient had surgery with Dr. Lorene Morillo on 2/22. Still in hospital due to Dr. Lorene Morillo having to reconstruct her tibia and patient still having trouble with it. Discharge date is set for tomorrow, but has a post op check scheduled tomorrow at [address] [place] [address] and daughter said she will not be discharged and able to be at appointment by that time. Asking when she needs to be rescheduled. Please advise.

## 2021-03-08 NOTE — PROGRESS NOTES
Physical Therapy    Facility/Department: [facility]SE REHAB      NAME: Donovan Nava  : 1950  MRN: [id number]    Date of Service: 3/8/2021    Evaluating Therapist: Moose Croft DPT    ROOM: [address] [place] [address]  DIAGNOSIS: History of revision of total knee arthroplasty   PMH: Pt had elective surgery for revision of left total knee arthroplasty with explantation of previous femoral and tibial component and revision of both the femoral and entire tibial component on 21. PMH includes DJD, HLD, Essential HTN, OA, Anxiety, Chronic Back Pain, Liver Disease, Obesity, Tricuspid Regurgitation    PRECAUTIONS: TTWB LLE, L Hinged Knee Brace (0-30 AROM per VO (Dr. Mera Course 21)    Social:  Pt lives with spouse in a [facility] home with 3 stairs to enter with single rail (R HR). Equipment Owned: [employer] and [employer]. Pt ambulated with no device independently PTA. Initial Evaluation  21 AM   PM Short Term Goals Long Term Goals    Was pt agreeable to Eval/treatment? Yes yes Yes      Does pt have pain? L knee pain 6/10 Mild  L knee pain  Mild knee pain especially posterior aspect. Bed Mobility  Rolling: SBA  Supine to sit: Rachel (LLE support)  Sit to supine: Rachel (LLE support)  Scooting: SBA NT Using a sheet for L LE management   Mod I with all aspects of bed mobility.    Sup Mod I   Transfers Sit to stand: Rachel  Stand to sit: cgA  Stand pivot: cgA/Rachel with bariatric ww Sit to stand mod I   Stand to sit mod I   Stand pivot mod I   with bariatric ww Sit to stand mod I  Stand to sit mod I   Stand pivot with mod I with bariatric ww Sup with AAD Mod I with AAD   Ambulation   15 feet with bariatric ww with cgA/Rachel 75 feet x 2  with bariatric ww with mod I 75 feet and 10 feet with ww with bariatric ww 50 feet with AAD with Sup 150 feet with AAD with Mod I   Walking 10 feet on uneven surface NT N/T NT     Wheel Chair Mobility 15 feet with B UE and SBA Nt NT >150 feet with B UE with Sup  >300 feet with B UE with Mod I   Car Transfers NT NT NT  Sup with AAD Mod I with AAD   Stair negotiation: ascended and descended                     NT 6 inch step with ww x7 reps SBA , ascend bwd, descend fwd NT  2 steps with B rail with Sup 4 steps with 1 rail and AAD with Mod I   Curb Step:   ascended and descended 2 inch step with // bars and cg/Rachel (ascending BW) NT 2 inch step with bariatric ww  With min A ( for walker management)  4 inch step with AAD and Sup 4 inch step with AAD and Mod I   Picking up object off the floor NT  Picked up cone with reacher with mod I     BLE ROM WFL       BLE Strength R LE 4/5  L LE: grossly  3-/5 (hip & knee),   3+/5 (ankle) (limited by pain)       Balance  Sitting: Independent  Standing: cgA with ww Standing SB-mod I      Date Family Teach Completed TBD       Is additional Family Teaching Needed? Y or N Y       Hindering Progress L LE weakness, Balance, TTWB, Stairs Balance weakness      PT recommended ELOS 2 weeks       Team's Discharge Plan        Therapist at Team Meeting            Pt performed therapeutic exercise of the following:  AM  Seated exercises with R LE only 4# weights   Ankle pumps ( x30) LAQs ( 2x15)  Marching ( 2x15)   PM  Standing exercises at bariatric ww : L L only - hip abduction 2x15, hip flexion 2x15 and hip extension 2x 15      Patient education  Pt was educated on technique with stair negotiation     Patient response to education:   Pt verbalized understanding Pt demonstrated skill Pt requires further education in this area   x X with verbal cues assistance  x     Additional Comments: Pt completed functional mobility as noted above. Pt progressing toward goals. Dicussed with OT about giving pt a green dot in room. OT staff in agreement with this. Issued pt a green dot and nursing notified. PM, pt completed exercises as noted above. Reviewed home safety and L knee brace alignment/how to adjust brace with pt. Pt with good understanding.       Time in: 1000  Time out: 1045    Time in 1345  Time out 1430    Continue with physical therapy current plan of care.     Dylan Okeefe [id number]

## 2021-03-08 NOTE — PROGRESS NOTES
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 Oral Care 3/8/21 Mod I seated    Bathing 3/8/21 Mod I LH Sponge Sponge bath completed seated/standing at sink. UB Dressing 3/8/21 Independent  To don/doff pull over shirt seated in chair. LB Dressing 3/8/21 Mod I LB AE To don/doff pants and shoes with LB AE. Footwear 3/8/21 Mod I LB AE To don/doff slip on shoes seated in chair. Toileting 3/8/21 Mod I bariatric ww  Grab bar    Homemaking 3/5/21 CGA/min A W/c level   reacher      Functional Transfers / Balance:   Date Status DME  Comments   Sit Balance 3/8/21 Mod I  Dynamic ADL tasks. Stand Balance 3/8/21 Mod I Grab bar  Bariatric ww Demo'd during transfers and dynamic ADL tasks. [] Tub  [] Shower   Transfer  TBA- See Comments dated on 3/4/21 note. Commode   Transfer 3/8/21 Mod I Grab bar,  Bariatric 3-in-1 SPT from w/c<>commode. Functional   Mobility 3/8/21 Mod I Bariatric w. Walker  Completed short distances within pt's room for functional item retrieval for morning ADL tasks. Other:  Sit<>Stand:     Firm recliner and queen bed    3/8/21      3/5/21   Mod I      SBA/CGA Bariatric w. Walker       Functional Exercises / Activity:  -2# hand weight exercises completed 2x15 in all planes with focus on increasing B UE strength and overall endurance for functional transfers/mobility and ADL tasks. Fair+ tolerance. -Yellow Candobar completed x30 in all planes with focus on increasing B UE strength and overall endurance for functional activities. Fair+ tolerance.      Sensory / Neuromuscular Re-Education:      Cognitive 20-30 minute functional activity  Long term goal 9: Pt demo independent to complete a BUE strengtheing home exercise program    [x] Continue with current OT Plan of care.   [] Prepare for Discharge     DISCHARGE RECOMMENDATIONS  Recommended DME: [facility], bariatric 3-in-1 commode, [facility](issued)    Post Discharge Care:   []Home Independently  []Home with 24hr Care / Supervision [x]Home with Partial Supervision []Home with Home Health OT []Home with Out Pt OT []Other: ___   Comments:         Time in Time out Tx Time Breakdown  Variance:   First Session  0750 0830 [x] Individual Tx- 40 Mins  [] Concurrent Tx -  [] Co-Tx -   [] Group Tx -   [] Time Missed -     Second Session 2709 [id number] [x] Individual Tx-45 Mins  [] Concurrent Tx -  [] Co-Tx -   [] Group Tx -   [] Time Missed -     Third Session    [] Individual Tx-   [] Concurrent Tx -  [] Co-Tx -   [] Group Tx -   [] Time Missed -         Total Tx Time 85 Mins     Latasha Mireles BELTRÁN/L [id number]      I have read & agree with the above status    Solitario Harris OTR/L [id number]

## 2021-03-08 NOTE — PROGRESS NOTES
Department of Orthopedic Surgery   Progress Note    Patient seen and examined, Post Op Day # 14. Pain controlled. No new complaints. Denies chest pain, shortness of breath, calf pain, dizziness/lightheadedness. Denies fevers or chills. Denies N/V/D. Patient has been admitted to acute rehab unit since surgery. Patient feels she has been doing well. Plan for discharge to home tomorrow, has [place] [phone] set up with I. Patient continues to maintain hinged ROM brace, set 0-30, has been TTWB on LLE    VITALS:  /82   Pulse 80   Temp 98.4 °F (36.9 °C) (Oral)   Resp 16   Ht 5' 5\" (1.651 m)   Wt 260 lb (117.9 kg)   SpO2 97%   BMI 43.27 kg/m²     GENERAL: alert, cooperative and no distress  MUSCULOSKELETAL:   left lower extremity:  · Island dressing and ACE wraps clean, dry, and intact  · Compartments soft and compressible, calf non-tender  · Palpable dorsalis pedis and posterior tibialis pulse, brisk cap refill to toes, foot warm and perfused  · Sensation intact to light touch in sural/deep peroneal/superficial peroneal/saphenous/posterior tibial nerve distributions to foot/ankle.   · Demonstrates active ankle plantar/dorsiflexion/great toe extension    CBC:   Lab Results   Component Value Date    WBC 8.0 02/25/2021    HGB 11.1 02/25/2021    HCT 32.4 02/25/2021     02/25/2021         ASSESSMENT  S/P- 2/22/2021 Revision left total knee arthroplasty with explantation of previous femoral and tibial component and revision of both the femoral and entire tibial component      PLAN    · Staples removed, steri strips applied  · OK to shower tomorrow, no soaking or submerging until incision fully healed with no scabbing  · ACE wrap or stockinet/tubigrip  · Continue physical therapy and protocol: TTWB - LLE  · Hinged ROM advanced to 0-40 degrees of flexion today  · Hinged ROM brace to be taken down daily and monitoring for areas of skin breakdown  · Deep venous thrombosis prophylaxis - Aspirin BID, early mobilization  · Home care PT/OT  · Pain Control: PO multimodal pain control  · Continue advancing hinged ROM brace, continue with ice/elevation and compressoin  · D/C Plan: Plan for DC from [facility] [place] to [place] [phone] tomorrow, patient will follow up with orthopedics in 4 weeks in office.  To call sooner with any questions or concerns  · Discussed with Dr. Eyad Rosa MD     Electronically signed by Jaida Deshpande PA-C on 3/8/2021 at 9:47 AM

## 2021-03-09 VITALS
TEMPERATURE: 98.5 F | WEIGHT: 260 LBS | SYSTOLIC BLOOD PRESSURE: 126 MMHG | OXYGEN SATURATION: 97 % | HEIGHT: 65 IN | BODY MASS INDEX: 43.32 KG/M2 | DIASTOLIC BLOOD PRESSURE: 62 MMHG | HEART RATE: 90 BPM | RESPIRATION RATE: 16 BRPM

## 2021-03-09 PROCEDURE — 6370000000 HC RX 637 (ALT 250 FOR IP): Performed by: STUDENT IN AN ORGANIZED HEALTH CARE EDUCATION/TRAINING PROGRAM

## 2021-03-09 PROCEDURE — 97530 THERAPEUTIC ACTIVITIES: CPT

## 2021-03-09 PROCEDURE — 6370000000 HC RX 637 (ALT 250 FOR IP): Performed by: PHYSICAL MEDICINE & REHABILITATION

## 2021-03-09 PROCEDURE — 97110 THERAPEUTIC EXERCISES: CPT

## 2021-03-09 RX ORDER — AMLODIPINE BESYLATE 5 MG/1
5 TABLET ORAL DAILY
Qty: 30 TABLET | Refills: 3 | Status: SHIPPED | OUTPATIENT
Start: 2021-03-10 | End: 2021-05-29

## 2021-03-09 RX ORDER — SENNA AND DOCUSATE SODIUM 50; 8.6 MG/1; MG/1
1 TABLET, FILM COATED ORAL 2 TIMES DAILY
Qty: 60 TABLET | Refills: 0 | Status: SHIPPED | OUTPATIENT
Start: 2021-03-09 | End: 2021-07-27

## 2021-03-09 RX ORDER — OXYCODONE HYDROCHLORIDE 10 MG/1
10 TABLET ORAL EVERY 4 HOURS PRN
Qty: 42 TABLET | Refills: 0 | Status: SHIPPED | OUTPATIENT
Start: 2021-03-09 | End: 2021-03-16

## 2021-03-09 RX ORDER — POLYETHYLENE GLYCOL 3350 17 G/17G
17 POWDER, FOR SOLUTION ORAL DAILY PRN
Qty: 527 G | Refills: 1 | COMMUNITY
Start: 2021-03-09 | End: 2021-04-08

## 2021-03-09 RX ORDER — LACTULOSE 10 G/15ML
20 SOLUTION ORAL DAILY PRN
Qty: 1 BOTTLE | Refills: 1 | Status: SHIPPED | OUTPATIENT
Start: 2021-03-09 | End: 2021-07-27

## 2021-03-09 RX ADMIN — ACETAMINOPHEN 650 MG: 325 TABLET ORAL at 11:24

## 2021-03-09 RX ADMIN — ASPIRIN 325 MG: 325 TABLET, COATED ORAL at 08:50

## 2021-03-09 RX ADMIN — OXYCODONE HYDROCHLORIDE 10 MG: 10 TABLET ORAL at 01:26

## 2021-03-09 RX ADMIN — BUPROPION HYDROCHLORIDE 300 MG: 300 TABLET, FILM COATED, EXTENDED RELEASE ORAL at 08:51

## 2021-03-09 RX ADMIN — DOCUSATE SODIUM 50 MG AND SENNOSIDES 8.6 MG 1 TABLET: 8.6; 5 TABLET, FILM COATED ORAL at 08:50

## 2021-03-09 RX ADMIN — AMLODIPINE BESYLATE 5 MG: 5 TABLET ORAL at 08:50

## 2021-03-09 RX ADMIN — ACETAMINOPHEN 650 MG: 325 TABLET ORAL at 05:03

## 2021-03-09 ASSESSMENT — PAIN SCALES - GENERAL
PAINLEVEL_OUTOF10: 7
PAINLEVEL_OUTOF10: 6

## 2021-03-09 NOTE — PROGRESS NOTES
Physical Therapy    Facility/Department: [address] REHAB  Discharge Summary     NAME: Eliana Wade  : 1950  MRN: [id number]    Date of Service: 3/9/2021    Evaluating Therapist: Quiana Berman DPT    ROOM: 8650/9454-D  DIAGNOSIS: History of revision of total knee arthroplasty   PMH: Pt had elective surgery for revision of left total knee arthroplasty with explantation of previous femoral and tibial component and revision of both the femoral and entire tibial component on 21. PMH includes DJD, HLD, Essential HTN, OA, Anxiety, Chronic Back Pain, Liver Disease, Obesity, Tricuspid Regurgitation    PRECAUTIONS: TTWB LLE, L Hinged Knee Brace (0-40 AROM per VO (Dr. Trupti Busch 21)    Social:  Pt lives with spouse in a [facility] home with 3 stairs to enter with single rail (R HR). Equipment Owned: [facility] and [facility]. Pt ambulated with no device independently PTA.      Initial Evaluation  2/25/21 3/9/21  Short Term Goals Long Term Goals    Bed Mobility  Rolling: SBA  Supine to sit: Rachel (LLE support)  Sit to supine: Rachel (LLE support)  Scooting: SBA Mod I (all aspects) Sup Mod I   Transfers Sit to stand: Rachel  Stand to sit: cgA  Stand pivot: cgA/Rachel with bariatric ww Sit to stand mod I   Stand to sit mod I   Stand pivot mod I   with bariatric ww Sup with AAD Mod I with AAD   Ambulation   15 feet with bariatric ww with cgA/Rachel 75 feet with bariatric ww mod I  50 feet with AAD with Sup 150 feet with AAD with Mod I   Walking 10 feet on uneven surface NT N/T     Wheel Chair Mobility 15 feet with B UE and ' with B UE mod I >150 feet with B UE with Sup  >300 feet with B UE with Mod I   Car Transfers NT Mod I  Sup with AAD Mod I with AAD   Stair negotiation: ascended and descended                     NT 3 steps with bilateral rails with SBA 2 steps with B rail with Sup 4 steps with 1 rail and AAD with Mod I   Curb Step:   ascended and descended 2 inch step with // bars and cg/Rachel (ascending BW) 2 inch step with bariatric ww with supervision   4 inch step with AAD and Sup 4 inch step with AAD and Mod I   Picking up object off the floor NT Picked up cone with reacher with mod I     BLE ROM WFL WFL     BLE Strength R LE 4/5  L LE: grossly  3-/5 (hip & knee),   3+/5 (ankle) (limited by pain) R LE 4/5  L LE: grossly  3-/5 (hip & knee),   3+/5 (ankle)     Balance  Sitting: Independent  Standing: cgA with ww Sitting: Independent   Standing: mod I with ww      Date Family Teach Completed TBD      Is additional Family Teaching Needed? Y or N Y      Hindering Progress L LE weakness, Balance, TTWB, Stairs      PT recommended ELOS 2 weeks      Team's Discharge Plan       Therapist at Team Meeting         Pt made steady and consistent progress towards all LTG's while on the ARU. Pt Mod I with bariatric ww for short distance ambulation and maintaining TTWB with mobility. Rec w/c for longer distance mobility until WB upgraded by orthopedics. Rec HHPT for continued rehabilitation and safe transition home.       Janeth Dickey, DPT  [id number]

## 2021-03-09 NOTE — DISCHARGE SUMMARY
Rehabilitation Discharge Summary     Patient Identification  Gloria Richard is a 65 y.o. female. :  1950  Admit Date:  2021  Discharge date and time: No discharge date for patient encounter. Attending Provider: Winston Velazquez MD                                     Admission Diagnoses:   History of revision of total knee arthroplasty [Z96.659]    Discharge Diagnoses:   Patient Active Problem List   Diagnosis    Abnormal nuclear stress test    Essential hypertension    Primary osteoarthritis of left knee    Hyperlipidemia    Fatty liver    Anxiety    Vitamin D deficiency    Class 3 severe obesity due to excess calories with serious comorbidity and body mass index (BMI) of 40.0 to 44.9 in adult [facility])    EKG abnormalities    Carotid stenosis, left    Pre-diabetes    Varicose veins of both lower extremities with pain    Chronic right-sided low back pain without sciatica    COVID-19    History of revision of total knee arthroplasty        Discharge Physician: Winston Velazquez    Admission Functional Status:      Initial Evaluation  21        Short Term Goals Long Term Goals    Was pt agreeable to Eval/treatment? Yes           Does pt have pain?  L knee pain 6/10           Bed Mobility  Rolling: SBA  Supine to sit: Evgeny (LLE support)  Sit to supine: Evgeny (LLE support)  Scooting: SBA     Sup Mod I   Transfers Sit to stand: Evgeny  Stand to sit: cgA  Stand pivot: cgA/Evgeny with bariatric ww     Sup with AAD Mod I with AAD   Ambulation   15 feet with bariatric ww with cgA/Evgeny     50 feet with AAD with Sup 150 feet with AAD with Mod I   Walking 10 feet on uneven surface NT           Wheel Chair Mobility 15 feet with B UE and SBA     >150 feet with B UE with Sup  >300 feet with B UE with Mod I   Car Transfers NT     Sup with AAD Mod I with AAD   Stair negotiation: ascended and descended                     NT     2 steps with B rail with Sup 4 steps with 1 rail and AAD with Mod I Curb Step:   ascended and descended [place] step with // bars and cg/Rachel (ascending BW)     4 inch step with AAD and Sup 4 inch step with AAD and Mod I   Picking up object off the floor NT           BLE ROM WFL           BLE Strength R LE 4/5  L LE: grossly  3-/5 (hip & knee),   3+/5 (ankle) (limited by pain)           Balance  Sitting: Independent  Standing: cgA with ww              Balance  Sitting Balance: Supervision  Standing Balance: Minimal assistance  Toilet Transfers  Toilet - Technique: Stand pivot  Equipment Used: Standard bedside commode  Toilet Transfer: Moderate assistance  Toilet Transfers Comments: pt require increased time to move & min vc's for safety      ADL  Feeding: Setup  Grooming: Supervision;Setup(seated)  UE Bathing: Stand by assistance  LE Bathing: Minimal assistance; Increased time to complete;Verbal cueing;Setup- sponge bathing both seated & standing  UE Dressing: Minimal assistance(pt require MIn A to don her bra & Sup to don t-shirt)  LE Dressing: Moderate assistance  Toileting:  Moderate assistance  Additional Comments: Pt require increased time to move & min vc's for hand placement      Tone RUE  RUE Tone: Normotonic  Tone LUE  LUE Tone: Normotonic  Coordination  Movements Are Fluid And Coordinated: Yes  Bed mobility  Supine to Sit: Moderate assistance- Pt require assist to move LLE getting OOB on the right hand side of the bed  Scooting: Stand by assistance  Transfers  Stand Pivot Transfers: Minimal assistance  Sit to stand: Minimal assistance  Stand to sit: Minimal assistance      Vision - Basic Assessment  Prior Vision: Wears glasses all the time  Visual History: No significant visual history  Patient Visual Report: No visual complaint reported.      Cognition  Overall Cognitive Status: WFL  Sensation  Overall Sensation Status: [facility]         Discharge Functional Status:      Initial Evaluation  2/25/21 3/9/21  Short Term Goals Long Term Goals    Bed Mobility  Rolling: SBA  Supine to sit: Rachel (LLE support)  Sit to supine: Rachel (LLE support)  Scooting: SBA Mod I (all aspects) Sup Mod I   Transfers Sit to stand: Rachel  Stand to sit: cgA  Stand pivot: cgA/Rachel with bariatric ww Sit to stand mod I   Stand to sit mod I   Stand pivot mod I   with bariatric ww Sup with AAD Mod I with AAD   Ambulation   15 feet with bariatric ww with cgA/Rachel 75 feet with bariatric ww mod I  50 feet with AAD with Sup 150 feet with AAD with Mod I   Walking 10 feet on uneven surface NT N/T       Wheel Chair Mobility 15 feet with B UE and ' with B UE mod I >150 feet with B UE with Sup  >300 feet with B UE with Mod I   Car Transfers NT Mod I  Sup with AAD Mod I with AAD   Stair negotiation: ascended and descended                     NT 3 steps with bilateral rails with SBA 2 steps with B rail with Sup 4 steps with 1 rail and AAD with Mod I   Curb Step:   ascended and descended 2 inch step with // bars and cg/Rachel (ascending BW) 2 inch step with bariatric ww with supervision   4 inch step with AAD and Sup 4 inch step with AAD and Mod I   Picking up object off the floor NT Picked up cone with reacher with mod I       BLE ROM WFL WFL       BLE Strength R LE 4/5  L LE: grossly  3-/5 (hip & knee),   3+/5 (ankle) (limited by pain) R LE 4/5  L LE: grossly  3-/5 (hip & knee),   3+/5 (ankle)       Balance  Sitting: Independent  Standing: cgA with ww Sitting: Independent   Standing: mod I with ww              Date   Status   AE    Comments     Feeding   3/8/21   Independent             Grooming   3/8/21   Mod I   seated               Oral Care   3/8/21   Mod I   seated         Bathing   3/8/21   Mod I   LH Sponge         UB Dressing   3/8/21   Independent             LB Dressing   3/8/21   Mod I   LB AE         Footwear   3/8/21   Mod I   LB AE         Toileting   3/8/21   Mod I   bariatric ww    Grab bar       Homemaking   3/9/21   Mod I   W/c level     reacher Pt completed simple homemaking cleaning activity seated in w/c. Pt able to safely maneuver around to gather items needed to complete task.           Functional Transfers / Balance:      Date Status DME  Comments   Sit Balance 3/8/21   Mod I       Stand Balance 3/8/21   Mod I Grab bar  Bariatric ww     []? Tub  []? Shower   Transfer     TBA- See Comments dated on 3/4/21 note & 3/9/21 note    3/9/21: Discussed pt's clearance to shower per ortho with pt. Pt reports she is still hesitant to shower upon discharge and that she can't get to her walk in shower in the basement at this time. Discussed ETB with pt and pt reports she utilized PTA in her stand alone tub (no shower), offered to complete tub transfer with pt however pt stated she wanted to [facility] with my therapist at home\". Commode   Transfer 3/8/21   Mod I Grab bar,  Bariatric 3-in-1     Functional   Mobility 3/8/21   Mod I Bariatric w. Walker      Other:  Sit<>Stand:      Firm recliner and queen bed     3/8/21        3/5/21    Mod I        SBA/CGA Bariatric w. Walker         Functional Exercises / Activity:  -Resistive clothespins completed seated at table with focus on increasing B UE AROM/strength and B B hand/ strength. Completed x3 with B UE's with fair tolerance. -Geometric shape patterns completed with 1# wrist weights donned with focus on increasing B UE strength and endurance for functional transfers/mobility and ADL tasks. Fair+ tolerance.      Sensory / Neuromuscular Re-Education:        Cognitive Skills:    Status Comments   Problem   Solving good      Memory good      Sequencing fair +     Safety fair +             Indication for Admission: Limited mobility and self-care following revision knee arthroplasty on the left and with precautions of limited range of motion and weightbearing as per Ortho. Rehabilitation Course: She did participate in a comprehensive inpatient rehab program.  She made functional gains as outlined above. Her pain was under good control. Her incision was healing well.   She was maintained on DVT prophylaxis. Her precautions were continued as per orthopedics. She was instructed in these for continued use at home. Bowel and bladder functioning adequately. She had no signs of active bleeding. Consults: orthopedic surgery    Significant Diagnostic Studies: labs:   Results for Eh Guerra (MRN [id number]) as of 4/15/2021 13:15   Ref.  Range 2/25/2021 03:46   Sodium Latest Ref Range: 132 - 146 mmol/L 140   Potassium Latest Ref Range: 3.5 - 5.0 mmol/L 3.5   Chloride Latest Ref Range: 98 - 107 mmol/L 104   CO2 Latest Ref Range: 22 - 29 mmol/L 29   BUN Latest Ref Range: 8 - 23 mg/dL 6 (L)   Creatinine Latest Ref Range: 0.5 - 1.0 mg/dL 0.7   Anion Gap Latest Ref Range: 7 - 16 mmol/L 7   GFR Non- Latest Ref Range: >=60 mL/min/1.73 >60   GFR African American Unknown >60   Magnesium Latest Ref Range: 1.6 - 2.6 mg/dL 2.2   Glucose Latest Ref Range: 74 - 99 mg/dL 101 (H)   Calcium Latest Ref Range: 8.6 - 10.2 mg/dL 9.2   Total Protein Latest Ref Range: 6.4 - 8.3 g/dL 6.0 (L)   Albumin Latest Ref Range: 3.5 - 5.2 g/dL 3.3 (L)   Alk Phos Latest Ref Range: 35 - 104 U/L 122 (H)   ALT Latest Ref Range: 0 - 32 U/L 27   AST Latest Ref Range: 0 - 31 U/L 38 (H)   Bilirubin Latest Ref Range: 0.0 - 1.2 mg/dL 1.1   WBC Latest Ref Range: 4.5 - 11.5 E9/L 8.0   RBC Latest Ref Range: 3.50 - 5.50 E12/L 3.63   Hemoglobin Quant Latest Ref Range: 11.5 - 15.5 g/dL 11.1 (L)   Hematocrit Latest Ref Range: 34.0 - 48.0 % 32.4 (L)   MCV Latest Ref Range: 80.0 - 99.9 fL 89.3   MCH Latest Ref Range: 26.0 - 35.0 pg 30.6   MCHC Latest Ref Range: 32.0 - 34.5 % 34.3   MPV Latest Ref Range: 7.0 - 12.0 fL 10.9   RDW Latest Ref Range: 11.5 - 15.0 fL 12.9   Platelet Count Latest Ref Range: 130 - 450 E9/L 207   Neutrophils % Latest Ref Range: 43.0 - 80.0 % 68.2   Immature Granulocytes % Latest Ref Range: 0.0 - 5.0 % 0.6   Lymphocyte % Latest Ref Range: 20.0 - 42.0 % 21.6   Monocytes % Latest Ref Range: 2.0 - 12.0 % 6.5   Eosinophils % Latest Ref Range: 0.0 - 6.0 % 2.6   Basophils % Latest Ref Range: 0.0 - 2.0 % 0.5   Neutrophils Absolute Latest Ref Range: 1.80 - 7.30 E9/L 5.47   Immature Granulocytes # Latest Units: E9/L 0.05   Lymphocytes Absolute Latest Ref Range: 1.50 - 4.00 E9/L 1.73   Monocytes Absolute Latest Ref Range: 0.10 - 0.95 E9/L 0.52   Eosinophils Absolute Latest Ref Range: 0.05 - 0.50 E9/L 0.21   Basophils Absolute Latest Ref Range: 0.00 - 0.20 E9/L 0.04   Prothrombin Time Latest Ref Range: 9.3 - 12.4 sec 14.9 (H)   INR Unknown 1.3       Treatments: therapies: PT, OT, RN and SW    Discharge Exam:  Vitals:    03/09/21 0845   BP: 126/62   Pulse: 90   Resp:    Temp:    SpO2:      HEENT: NC/AT, anicteric, mmm  Neck: supple  Lungs: clear  Heart: regular  Ext: brace (L) LE, no difficulty with (L) ankle ROM  Neuro: light touch intact, no weakness with ankle DF, inversion or eversion. Disposition: Home with family    Condition: Improved    Patient Instructions:   She is to notify her physician of any problems. Ulises Love   Home Medication Instructions ETH:[id number]    Printed on:04/15/21 1316   Medication Information                      amLODIPine (NORVASC) 5 MG tablet  Take 1 tablet by mouth daily             aspirin 325 MG EC tablet  Take 1 tablet by mouth 2 times daily (with meals)             buPROPion (WELLBUTRIN XL) 300 MG extended release tablet  Take 1 tablet by mouth every morning             Coenzyme Q10 (CO Q 10 PO)  Take 1 tablet by mouth daily.                lactulose (CHRONULAC) 10 GM/15ML solution  Take 30 mLs by mouth daily as needed (constipation unrelieved by other medication)             Multiple Vitamins-Minerals (CENTRUM SILVER 50+WOMEN PO)  Take by mouth             sennosides-docusate sodium (SENOKOT-S) 8.6-50 MG tablet  Take 1 tablet by mouth 2 times daily             simvastatin (ZOCOR) 40 MG tablet  Take 1 tablet by mouth nightly             vitamin D (CHOLECALCIFEROL) 5000 UNITS CAPS capsule  Take 5,000 Units by mouth daily             vitamin E 400 UNIT capsule  Take 400 Units by mouth daily. Activity: activity as tolerated and within the precautions as instructed per orthopedics. Diet: regular diet  Wound Care: keep wound clean and dry  DME Orders after Discharge: No equipment was needed.     Follow-up:  [id number]  [address]     and 3 in 1 commode  [address] Muñiz MD  In 3 weeks    [place]   Sandra Hawkins MD    As needed  [facility] [address]  [phone]   Livia Callahan DO  In 2 weeks    [address] [facility].  [address]

## 2021-03-09 NOTE — PROGRESS NOTES
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 Pt able to safely maneuver around to gather items needed to complete task. Functional Transfers / Balance:   Date Status DME  Comments   Sit Balance 3/8/21 Mod I     Stand Balance 3/8/21 Mod I Grab bar  Bariatric ww    [] Tub  [] Shower   Transfer  TBA- See Comments dated on 3/4/21 note & 3/9/21 note   3/9/21: Discussed pt's clearance to shower per ortho with pt. Pt reports she is still hesitant to shower upon discharge and that she can't get to her walk in shower in the basement at this time. Discussed ETB with pt and pt reports she utilized PTA in her stand alone tub (no shower), offered to complete tub transfer with pt however pt stated she wanted to [facility] with my therapist at home\". Commode   Transfer 3/8/21 Mod I Grab bar,  Bariatric 3-in-1    Functional   Mobility 3/8/21 Mod I Bariatric w. Walker     Other:  Sit<>Stand:     Firm recliner and queen bed    3/8/21      3/5/21   Mod I      SBA/CGA Bariatric w. Walker       Functional Exercises / Activity:  -Resistive clothespins completed seated at table with focus on increasing B UE AROM/strength and B B hand/ strength. Completed x3 with B UE's with fair tolerance.    -Geometric shape patterns completed with 1# wrist weights donned with focus on increasing B UE strength and endurance for functional transfers/mobility and ADL tasks. Fair+ tolerance. Sensory / Neuromuscular Re-Education:      Cognitive Skills:   Status Comments   Problem   Solving good     Memory good     Sequencing fair +    Safety fair +      Visual Perception:    Education:  Pt educated on safety/fall prevention on discharge. Pt verbalized/demonstrated good understanding and reports no further questions/concerns regarding discharge later this date. [x] Family teach completed on:  3/5/21 Dtr. Present for Family Teach and educated with functional transfers on/off queen size bed, bariatric 3:1 commode, firm recliner, fxl mobility using bariatric w. Walker and w/c level kitchen mobility training. Pt will be getting bariatric 3:1 device for home and educated Dtr. on how to adjust legs and remove back bar if necessary to promote fit at home. Pt educated on how to remove elevated leg rest then place back on and Dtr.had hands on training with leg rests. Pain Level: No c/o pain this date. Additional Notes:  3/3/21: Pt issued LB AE this date for increased independence with ADL tasks. Patient has made good  progress during treatment sessions toward set goals.  Therapy emphasis to obtain goals:   Current Treatment Recommendations: Strengthening, Endurance Training, Patient/Caregiver Education & Training, Equipment Evaluation, Education, & procurement, Self-Care / ADL, Balance Training, Gait Training, Home Management Training, Functional Mobility Training, Safety Education & Training    Long term goal 1: Pt demo independent to eat all meals  Long term goal 2: Pt demo Mod I grooming seated or standing  Long term goal 3: Pt demo SBA to sponge bathe both seated & standing  Long term goal 4: Pt demo I UE & Mod I LE dressinculding underwear, pants, socks & shoes using AE as needed  Long term goal 5: Pt demo Mod I commode trf & Mod I toileting & use approrpaite DME to ensure pt safety  Long term goals 6: Pt demo SBA walk in shower trf using appropraite DME to ensurept safety  Long term goal 7: Pt demo Mod I light homemaking activity & demo G- safety & insight  Long term goal 8: Pt demo G- endurance for a 20-30 minute functional activity  Long term goal 9: Pt demo independent to complete a BUE strengtheing home exercise program    [] Continue with current OT Plan of care. [x] Prepare for Discharge     OCCUPATIONAL THERAPY DISCHARGE SUMMARY    Discontinue Occupational Therapy intervention. Pt has:   [x] met all set goals. [] made good progress toward set goals. [] has made slow progress toward goals and would benefit from rehab setting change.  [] had a medical decline and therefore was transferred off the Rehab unit.     Long term goals  Time Frame for Long term goals : 2 weeks to address above problem areas  Long term goal 1: Pt demo independent to eat all meals  Long term goal 2: Pt demo Mod I grooming seated or standing  Long term goal 3: Pt demo SBA to sponge bathe both seated & standing  Long term goal 4: Pt demo I UE & Mod I LE dressinculding underwear, pants, socks & shoes using AE as needed  Long term goal 5: Pt demo Mod I commode trf & Mod I toileting & use approrpaite DME to ensure pt safety  Long term goals 6: Pt demo SBA walk in shower trf using appropraite DME to ensurept safety  Long term goal 7: Pt demo Mod I light homemaking activity & demo G- safety & insight  Long term goal 8: Pt demo G- endurance for a 20-30 minute functional activity  Long term goal 9: Pt demo independent to complete a BUE strengtheing home exercise program    The Patient has received education on:  [x]Transfer Safety [x]Compensatory tech for ADLs [x]AE training [x]DME training [x]Energy Conservation [x]Home / Kitchen Safety  [x]Fall Prevention  []Other:    Family training was completed: yes       DISCHARGE RECOMMENDATIONS  Recommended DME: LB AE, bariatric 3-in-1 commode, LB AE(issued)    Post Discharge Care:   []Home Independently  []Home with 24hr Care / Supervision [x]Home with Partial Supervision []Home with Home Health OT []Home with Out Pt OT []Other: ___   Comments:         Time in Time out Tx Time Breakdown  Variance:   First Session  [phone] [x] Individual Tx- 45 Mins  [] Concurrent Tx -  [] Co-Tx -   [] Group Tx -   [] Time Missed -     Second Session   [] Individual Tx-  [] Concurrent Tx -  [] Co-Tx -   [] Group Tx -   [] Time Missed -     Third Session    [] Individual Tx-   [] Concurrent Tx -  [] Co-Tx -   [] Group Tx -   [] Time Missed -         Total Tx Time 45 Mins     Lars Dan BELTRÁN/L [id number]    I have read & agree with the above status.     Mervat Nice OTR/L [id number]

## 2021-03-09 NOTE — PROGRESS NOTES
SBA    1 step with bariatric ww x8 reps with SBA  2 steps with B rail with Sup 4 steps with 1 rail and AAD with Mod I   Curb Step:   ascended and descended 2 inch step with // bars and cg/Rachel (ascending BW) 2 inch step with bariatric ww with supervision   4 inch step with AAD and Sup 4 inch step with AAD and Mod I   Picking up object off the floor NT       BLE ROM WFL       BLE Strength R LE 4/5  L LE: grossly  3-/5 (hip & knee),   3+/5 (ankle) (limited by pain)       Balance  Sitting: Independent  Standing: cgA with ww       Date Family Teach Completed TBD       Is additional Family Teaching Needed? Y or N Y       Hindering Progress L LE weakness, Balance, TTWB, Stairs Balance weakness      PT recommended ELOS 2 weeks       Team's Discharge Plan        Therapist at Team Meeting            Patient education  Pt was educated on technique with stair negotiation     Patient response to education:   Pt verbalized understanding Pt demonstrated skill Pt requires further education in this area   x X x     Additional Comments: Pt completed functional mobility as noted above. Time in: 1000  Time out: 1045      Continue with physical therapy current plan of care.     Madelyn Odell [id number]

## 2021-03-09 NOTE — PROGRESS NOTES
Progress Note    Subjective/   65y.o. year old female on the rehab unit for left revision knee arthroplasty. Tolerating the rehab program.  She is eager for discharge. No new complaints. Objective/   VITALS:  /62   Pulse 97   Temp 98.5 °F (36.9 °C) (Temporal)   Resp 16   Ht 5' 5\" (1.651 m)   Wt 260 lb (117.9 kg)   SpO2 97%   BMI 43.27 kg/m²   24HR INTAKE/OUTPUT:      Intake/Output Summary (Last 24 hours) at 3/8/2021 2354  Last data filed at 3/8/2021 1730  Gross per 24 hour   Intake 900 ml   Output --   Net 900 ml     Constitutional:  Alert, awake, no apparent distress   Cardiovascular:  S1, S2 without m/r/g   Respiratory:  CTA B without w/r/r   Abdomen: Positive bowel sounds  Ext: Minimal left LE edema, alignment good. [place] brace in place  Neuro: Light touch intact. Awake, alert and oriented x3. Functional Level    Initial Evaluation  2/25/21 AM   PM Short Term Goals Long Term Goals    Was pt agreeable to Eval/treatment? Yes yes Yes        Does pt have pain? L knee pain 6/10 Mild  L knee pain  Mild knee pain especially posterior aspect.        Bed Mobility  Rolling: SBA  Supine to sit: Rachel (LLE support)  Sit to supine: Rachel (LLE support)  Scooting: SBA NT Using a sheet for L LE management   Mod I with all aspects of bed mobility.    Sup Mod I   Transfers Sit to stand: Rachel  Stand to sit: cgA  Stand pivot: cgA/Rachel with bariatric ww Sit to stand mod I   Stand to sit mod I   Stand pivot mod I   with bariatric ww Sit to stand mod I  Stand to sit mod I   Stand pivot with mod I with bariatric ww Sup with AAD Mod I with AAD   Ambulation   15 feet with bariatric ww with cgA/Rachel 75 feet x 2  with bariatric ww with mod I 75 feet and 10 feet with ww with bariatric ww 50 feet with AAD with Sup 150 feet with AAD with Mod I   Walking 10 feet on uneven surface NT N/T NT       Wheel Chair Mobility 15 feet with B UE and SBA Nt NT >150 feet with B UE with Sup  >300 feet with B UE with Mod I Car Transfers NT NT NT  Sup with AAD Mod I with AAD   Stair negotiation: ascended and descended                     NT 6 inch step with ww x7 reps SBA , ascend bwd, descend fwd NT  2 steps with B rail with Sup 4 steps with 1 rail and AAD with Mod I   Curb Step:   ascended and descended 2 inch step with // bars and cg/Rachel (ascending BW) NT 2 inch step with bariatric ww  With min A ( for walker management)  4 inch step with AAD and Sup 4 inch step with AAD and Mod I   Picking up object off the floor NT   Picked up cone with reacher with mod I       BLE ROM WFL           BLE Strength R LE 4/5  L LE: grossly  3-/5 (hip & knee),   3+/5 (ankle) (limited by pain)           Balance  Sitting: Independent  Standing: cgA with ww Standing SB-mod I               Scheduled Meds:   amLODIPine  5 mg Oral Daily    simvastatin  40 mg Oral Nightly    acetaminophen  650 mg Oral Q6H    aspirin  325 mg Oral BID    sennosides-docusate sodium  1 tablet Oral BID    sodium chloride flush  10 mL Intravenous 2 times per day    buPROPion  300 mg Oral QAM     Continuous Infusions:  PRN Meds:hydrALAZINE, lactulose, magnesium hydroxide, ondansetron **OR** [DISCONTINUED] ondansetron, oxyCODONE **OR** oxyCODONE, sodium chloride flush, polyethylene glycol  I/O last 3 completed shifts: In: 900 [P.O.:900]  Out: -   No intake/output data recorded. Labs reviewed  CBC: No results for input(s): WBC, HGB, PLT in the last 72 hours. BMP:  No results for input(s): NA, K, CL, CO2, BUN, CREATININE, GLUCOSE in the last 72 hours. Hepatic: No results for input(s): AST, ALT, ALB, BILITOT, ALKPHOS in the last 72 hours. BNP: No results for input(s): BNP in the last 72 hours. Lipids: No results for input(s): CHOL, HDL in the last 72 hours. Invalid input(s): LDLCALCU  INR: No results for input(s): INR in the last 72 hours.     Assessment/  Patient Active Problem List:     Abnormal nuclear stress test     Essential hypertension     Primary osteoarthritis of left knee     Hyperlipidemia     Fatty liver     Anxiety     Vitamin D deficiency     Class 3 severe obesity due to excess calories with serious comorbidity and body mass index (BMI) of 40.0 to 44.9 in adult [facility])     EKG abnormalities     Carotid stenosis, left     Pre-diabetes     Varicose veins of both lower extremities with pain     Chronic right-sided low back pain without sciatica     COVID-19     History of revision of total knee arthroplasty      Plan/  1. Continue rehab program.  Focus on improved strength, mobility and independence  2. Maintain precautions  3. Plan for discharge tomorrow  4. Continue skin care  5. Continue pain control  6.  Continue DVT prophylaxis          Alberto Burdick MD

## 2021-03-10 ENCOUNTER — TELEPHONE (OUTPATIENT)
Dept: ORTHOPEDIC SURGERY | Age: 71
End: 2021-03-10

## 2021-03-10 DIAGNOSIS — T84.033A LOOSENING OF PROSTHESIS OF LEFT TOTAL KNEE REPLACEMENT, INITIAL ENCOUNTER (HCC): Primary | ICD-10-CM

## 2021-03-10 DIAGNOSIS — Z96.652 S/P REVISION OF TOTAL KNEE, LEFT: ICD-10-CM

## 2021-03-10 NOTE — TELEPHONE ENCOUNTER
Pt left VM requesting call back. Call placed to pt, she feels the walker she has at home currently is not sturdy enough and is requesting an order for a new one. Order pended and routed for decision and signature.

## 2021-03-29 LAB
FUNGUS (MYCOLOGY) CULTURE: NORMAL
FUNGUS (MYCOLOGY) CULTURE: NORMAL
FUNGUS STAIN: NORMAL
FUNGUS STAIN: NORMAL

## 2021-04-02 ENCOUNTER — TELEPHONE (OUTPATIENT)
Dept: ORTHOPEDIC SURGERY | Age: 71
End: 2021-04-02

## 2021-04-02 NOTE — TELEPHONE ENCOUNTER
Blaise Villalta, therapist with [facility], called office to notify staff that patient had a fall while working with therapy. She fell gently back onto her buttocks. Patient did not hit or injury knee. Blaise Villalta just wanted to notify office that patient was slightly sore from the fall. Call placed to patient. No answer, left voicemail.        Date of Procedure: 2/22/2021  Procedure(s):  LEFT KNEE TOTAL ARTHROPLASTY REVISION VS. IMPLANTATION OF ANTIBIOTIC SPACER  Surgeon(s):  MD Jin Flores DO

## 2021-04-05 ENCOUNTER — OFFICE VISIT (OUTPATIENT)
Dept: ORTHOPEDIC SURGERY | Age: 71
End: 2021-04-05
Payer: MEDICARE

## 2021-04-05 ENCOUNTER — HOSPITAL ENCOUNTER (OUTPATIENT)
Dept: GENERAL RADIOLOGY | Age: 71
Discharge: HOME | End: 2021-04-07
Payer: MEDICARE

## 2021-04-05 VITALS — TEMPERATURE: 98.8 F

## 2021-04-05 DIAGNOSIS — Z96.652 S/P REVISION OF TOTAL KNEE, LEFT: Primary | ICD-10-CM

## 2021-04-05 DIAGNOSIS — T84.033A LOOSENING OF PROSTHESIS OF LEFT TOTAL KNEE REPLACEMENT, INITIAL ENCOUNTER (HCC): ICD-10-CM

## 2021-04-05 PROCEDURE — 99212 OFFICE O/P EST SF 10 MIN: CPT

## 2021-04-05 PROCEDURE — 99024 POSTOP FOLLOW-UP VISIT: CPT | Performed by: PHYSICIAN ASSISTANT

## 2021-04-05 PROCEDURE — 73560 X-RAY EXAM OF KNEE 1 OR 2: CPT

## 2021-04-05 RX ORDER — OXYCODONE HYDROCHLORIDE 10 MG/1
10 TABLET ORAL EVERY 4 HOURS PRN
COMMUNITY
End: 2021-04-13 | Stop reason: ALTCHOICE

## 2021-04-05 NOTE — PROGRESS NOTES
plantar/dorsiflexion/great toe extension. States sensation intact to touch in sural/deep peroneal/superficial peroneal/saphenous/posterior tibial nerve distributions to foot/ankle. Palpable dorsalis pedis and posterior tibialis pulses, cap refill brisk in toes, foot warm/perfused. AROM L knee 0-90  nontender globally about the knee  Stable to varus and valgus              Temp 98.8 °F (37.1 °C) (Oral)     XR:   2 views of L knee demonstrating L revision TKA with healing proximal tibial intra-op fracture with no displacement. TKA remains intact without evidence of loosening. No acute fractures or dislocations or any other osseus abnormality identified. Assessment:   Diagnosis Orders   1. S/P revision of total knee, left  XR KNEE LEFT (1-2 VIEWS)       Plan:  Reviewed x-rays with patient today in office     Can start progressive weightbearing of 25% to the left lower extremity using walker/cane for support. Can continue to wear hinged knee brace for comfort while weightbearing. Stay 25% weightbearing to the left lower extremity for the next 7 to 10 days, then increase to 50% weightbearing for 7 to 10 days, then increase 75% weightbearing for the next 7 to 10 days, then increase to 100% weightbearing to the left lower extremity. If having significant pain, return to touch toe weightbearing. Continue aspirin twice daily for blood clot prevention until fully weightbearing, then can discontinue. Can continue current pain medications. Call the office for refills. Will wean to 5 mg oxycodone and continue weaning pain medication weekly. Continue using ice, elevation, compression with Ace wrap if needed. Continue home physical therapy. Once ready for outpatient physical therapy, please call the office and we can coordinate an outpatient physical therapy location that is convenient for you. Continue working on range of motion and strengthening exercises daily. Follow-up in approximately 6 weeks. You will receive a new x-ray of the left knee in office at that time. Please call with any questions or concerns. [phone]. Electronically signed by Sun Acosta PA-C on 4/5/2021 at 9:57 AM  Note: This report was completed using [employer] voiced recognition software. Every effort has been made to ensure accuracy; however, inadvertent computerized transcription errors may be present.

## 2021-04-05 NOTE — PATIENT INSTRUCTIONS
Can start progressive weightbearing of 25% to the left lower extremity using walker/cane for support. Can continue to wear hinged knee brace for comfort while weightbearing. Stay 25% weightbearing to the left lower extremity for the next 7 to 10 days, then increase to 50% weightbearing for 7 to 10 days, then increase 75% weightbearing for the next 7 to 10 days, then increase to 100% weightbearing to the left lower extremity. If having significant pain, return to touch toe weightbearing. Continue aspirin twice daily for blood clot prevention until fully weightbearing, then can discontinue. Can continue current pain medications. Call the office for refills. Will wean to 5 mg oxycodone and continue weaning pain medication weekly. Continue using ice, elevation, compression with Ace wrap if needed. Continue home physical therapy. Once ready for outpatient physical therapy, please call the office and we can coordinate an outpatient physical therapy location that is convenient for you. Continue working on range of motion and strengthening exercises daily. Follow-up in approximately 6 weeks. You will receive a new x-ray of the left knee in office at that time. Please call with any questions or concerns. [phone].

## 2021-04-13 DIAGNOSIS — Z96.652 S/P REVISION OF TOTAL KNEE, LEFT: Primary | ICD-10-CM

## 2021-04-13 LAB
AFB CULTURE (MYCOBACTERIA): NORMAL
AFB CULTURE (MYCOBACTERIA): NORMAL
AFB SMEAR: NORMAL
AFB SMEAR: NORMAL

## 2021-04-13 RX ORDER — OXYCODONE HYDROCHLORIDE 5 MG/1
5 TABLET ORAL EVERY 6 HOURS PRN
Qty: 28 TABLET | Refills: 0 | Status: SHIPPED
Start: 2021-04-13 | End: 2021-05-07 | Stop reason: SDUPTHER

## 2021-04-13 NOTE — TELEPHONE ENCOUNTER
Oxycodone 5mg Q6 hrs x7 days PRN prescribed. Weaned down from previous prescription of 10mg. Will continue to wean. Continue multimodal pain control - otc analgesics and RICE. Controlled Substance Monitoring:    Acute and Chronic Pain Monitoring:   RX Monitoring 4/13/2021   Acute Pain Prescriptions -   Periodic Controlled Substance Monitoring No signs of potential drug abuse or diversion identified.

## 2021-04-13 NOTE — TELEPHONE ENCOUNTER
Pt left  requesting refill of Oxy. DATE OF PROCEDURE: 2/22/21  PROCEDURE: Revision left total knee arthroplasty with explantation of previous femoral and tibial component and revision of both the femoral and entire tibial component    Order pended and routed for decision and signature.

## 2021-04-20 ENCOUNTER — OFFICE VISIT (OUTPATIENT)
Dept: PRIMARY CARE CLINIC | Age: 71
End: 2021-04-20
Payer: MEDICARE

## 2021-04-20 VITALS
DIASTOLIC BLOOD PRESSURE: 82 MMHG | TEMPERATURE: 98.7 F | SYSTOLIC BLOOD PRESSURE: 134 MMHG | BODY MASS INDEX: 39.27 KG/M2 | WEIGHT: 236 LBS

## 2021-04-20 DIAGNOSIS — E66.01 MORBID (SEVERE) OBESITY DUE TO EXCESS CALORIES (HCC): ICD-10-CM

## 2021-04-20 DIAGNOSIS — I10 ESSENTIAL HYPERTENSION: Primary | ICD-10-CM

## 2021-04-20 DIAGNOSIS — Z09 POSTOPERATIVE EXAMINATION: ICD-10-CM

## 2021-04-20 DIAGNOSIS — E55.9 VITAMIN D DEFICIENCY: Chronic | ICD-10-CM

## 2021-04-20 PROCEDURE — 1036F TOBACCO NON-USER: CPT | Performed by: FAMILY MEDICINE

## 2021-04-20 PROCEDURE — 1090F PRES/ABSN URINE INCON ASSESS: CPT | Performed by: FAMILY MEDICINE

## 2021-04-20 PROCEDURE — 1123F ACP DISCUSS/DSCN MKR DOCD: CPT | Performed by: FAMILY MEDICINE

## 2021-04-20 PROCEDURE — G8428 CUR MEDS NOT DOCUMENT: HCPCS | Performed by: FAMILY MEDICINE

## 2021-04-20 PROCEDURE — G8417 CALC BMI ABV UP PARAM F/U: HCPCS | Performed by: FAMILY MEDICINE

## 2021-04-20 PROCEDURE — 3017F COLORECTAL CA SCREEN DOC REV: CPT | Performed by: FAMILY MEDICINE

## 2021-04-20 PROCEDURE — 4040F PNEUMOC VAC/ADMIN/RCVD: CPT | Performed by: FAMILY MEDICINE

## 2021-04-20 PROCEDURE — G8399 PT W/DXA RESULTS DOCUMENT: HCPCS | Performed by: FAMILY MEDICINE

## 2021-04-20 PROCEDURE — 99214 OFFICE O/P EST MOD 30 MIN: CPT | Performed by: FAMILY MEDICINE

## 2021-04-20 RX ORDER — LOSARTAN POTASSIUM 50 MG/1
50 TABLET ORAL DAILY
Qty: 30 TABLET | Refills: 5 | Status: SHIPPED
Start: 2021-04-20 | End: 2021-05-29 | Stop reason: SDUPTHER

## 2021-04-20 ASSESSMENT — PATIENT HEALTH QUESTIONNAIRE - PHQ9
SUM OF ALL RESPONSES TO PHQ9 QUESTIONS 1 & 2: 0
SUM OF ALL RESPONSES TO PHQ QUESTIONS 1-9: 0

## 2021-04-20 ASSESSMENT — ENCOUNTER SYMPTOMS
EYES NEGATIVE: 1
RESPIRATORY NEGATIVE: 1
ALLERGIC/IMMUNOLOGIC NEGATIVE: 1
GASTROINTESTINAL NEGATIVE: 1

## 2021-04-20 NOTE — PROGRESS NOTES
21  Name: Paula Dykes    : 1950    Sex: female    Age: 65 y.o. Subjective:  Chief Complaint: Patient is here for  Dc with  lef tnee surgeyr  And elev  bp weight    She presents for post hospital visit accompanied by her sister. She is doing well with her left knee surgery. She has no chest pain or shortness of breath. No calf pain. She is on 2 aspirin a day. She was started on Norvasc in the hospital for elevated blood pressure. She has no increase in swelling in her lower extremities. Review of Systems   Constitutional: Negative. HENT: Negative. Eyes: Negative. Respiratory: Negative. Cardiovascular: Negative. Gastrointestinal: Negative. Endocrine: Negative. Genitourinary: Negative. Musculoskeletal:        See hpi   Skin: Negative. Allergic/Immunologic: Negative. Neurological: Negative. Hematological: Negative. Psychiatric/Behavioral: Negative. Current Outpatient Medications:     losartan (COZAAR) 50 MG tablet, Take 1 tablet by mouth daily replaces norvasc, Disp: 30 tablet, Rfl: 5    oxyCODONE (ROXICODONE) 5 MG immediate release tablet, Take 1 tablet by mouth every 6 hours as needed for Pain for up to 7 days. Intended supply: 7 days. Take lowest dose possible to manage pain, Disp: 28 tablet, Rfl: 0    Misc. Devices MISC, 1 each by Does not apply route once for 1 dose Standard folding walker with wheels. Dx: Revision left total knee arthroplasty with explantation of previous femoral and tibial component and revision of both the femoral and entire tibial component. SHANNAN: [employer].  Purchase, Disp: 1 Device, Rfl: 0    amLODIPine (NORVASC) 5 MG tablet, Take 1 tablet by mouth daily, Disp: 30 tablet, Rfl: 3    lactulose (CHRONULAC) 10 GM/15ML solution, Take 30 mLs by mouth daily as needed (constipation unrelieved by other medication), Disp: 1 Bottle, Rfl: 1    sennosides-docusate sodium (SENOKOT-S) 8.6-50 MG tablet, Take 1 tablet by mouth 2 times daily, Disp: 60 tablet, Rfl: 0    aspirin 325 MG EC tablet, Take 1 tablet by mouth 2 times daily (with meals) (Patient taking differently: Take 325 mg by mouth daily ), Disp: 60 tablet, Rfl: 0    Multiple Vitamins-Minerals (CENTRUM SILVER 50+WOMEN PO), Take by mouth, Disp: , Rfl:     buPROPion (WELLBUTRIN XL) 300 MG extended release tablet, Take 1 tablet by mouth every morning, Disp: 90 tablet, Rfl: 5    simvastatin (ZOCOR) 40 MG tablet, Take 1 tablet by mouth nightly, Disp: 90 tablet, Rfl: 5    vitamin D (CHOLECALCIFEROL) 5000 UNITS CAPS capsule, Take 5,000 Units by mouth daily, Disp: , Rfl:     vitamin E 400 UNIT capsule, Take 400 Units by mouth daily. , Disp: , Rfl:     Coenzyme Q10 (CO Q 10 PO), Take 1 tablet by mouth daily. , Disp: , Rfl:   No Known Allergies  Social History     Socioeconomic History    Marital status:      Spouse name: Not on file    Number of children: Not on file    Years of education: Not on file    Highest education level: Not on file   Occupational History    Not on file   Social Needs    Financial resource strain: Not on file    Food insecurity     Worry: Not on file     Inability: Not on file    Transportation needs     Medical: Not on file     Non-medical: Not on file   Tobacco Use    Smoking status: Never Smoker    Smokeless tobacco: Never Used   Substance and Sexual Activity    Alcohol use:  Yes     Alcohol/week: 1.0 standard drinks     Types: 1 Glasses of wine per week     Comment: socially    Drug use: No    Sexual activity: Not on file   Lifestyle    Physical activity     Days per week: Not on file     Minutes per session: Not on file    Stress: Not on file   Relationships    Social connections     Talks on phone: Not on file     Gets together: Not on file     Attends Faith service: Not on file     Active member of club or organization: Not on file     Attends meetings of clubs or organizations: Not on file     Relationship status: Not on file    Intimate partner violence     Fear of current or ex partner: Not on file     Emotionally abused: Not on file     Physically abused: Not on file     Forced sexual activity: Not on file   Other Topics Concern    Not on file   Social History Narrative        OA    P HYSTER    OBESITY    LIPID    BILAT TOTAL KNEE [facility]     MILD T R    TICS    LLL PNEUMONIA 11-03    L5 S1 NARROWING    COLON----2-04 5 YRS    BASAL CELL CA R SHOUDLER    L-5----S-1 ADVANCED NARROWING---- 4-09    KITCHEN AT [facility] [address]  1950 Page #2    COLON 3-12 DUNCH---10 YRS    LLL PNEUMONIA 3-12    TMT ABNORMAL  AND SEEN BY DR Ace Carias AND SAID CHG LIFESTYLE AND REDUCE RISK OR LIKELY    VAS DIS IN FUTURE    RETIRE END MAY 2-14    SLEEP STUDY - ONLY MILE SLEEP APNEA    OA LUMBAR SPINE AND OA HIPS---1-15    LEFT TMJ 4-15    TWO SONS----[email or web address] AND [employer]    ONE DARYL--WORKS CO  OF [employer]---NOT     LOW BACK PAIN--INJECTIONS DR Nba Lee 2016    INFLU A 3-    OA R > L HIPS AND LUMBAR SPINE-- AND GB STONES----     GB OUT --WHO SAID LIVER WAS VERY FATTY DURING SURGERY AND ADVISED AGGRESSIVE WT LOSS    FATTY LIVER ---DR KEARNEY DC PT     TROCHANTERIC BURSITIS FIGHT HIP--INJECTION DR HENRY     [facility] ---REFERRED BACK TO DR Nba Lee    NON HEALING LEFT DISTAL FIBULA AVULSION FX -    L KNEE PAIN DR Gloria Camejo SENT TO DR SHORT  AND TOLD NEEDS REPLACEMENT---PT PLANS 2019    Neg heart cath         Past Medical History:   Diagnosis Date    Anxiety     Basal cell carcinoma (BCC) of right shoulder     Chronic back pain     Closed avulsion fracture of distal fibula with delayed healing, left     Degenerative joint disease involving multiple joints     Diverticulitis     Hyperlipidemia     Hypertension     Essential hypertension    Liver disease     LLL pneumonia     Obesity     Osteoarthritis     Palpitations 2012    Sleep apnea     Tricuspid regurgitation     Trochanteric bursitis of right hip     Varicose veins of both lower extremities      Family History   Problem Relation Age of Onset    Lung Cancer Father     Thyroid Disease Sister       Past Surgical History:   Procedure Laterality Date    BREAST SURGERY  1990    cyst lt breast    CARDIAC CATHETERIZATION  06/19/2019    dr Kohli Shows  5/2012    HYSTERECTOMY  1980    JOINT REPLACEMENT  2001    L knee    JOINT REPLACEMENT  2001    rt knee    TOTAL KNEE ARTHROPLASTY Left 2/22/2021    LEFT KNEE TOTAL ARTHROPLASTY REVISION performed by Hazel Selby MD at [address]:    04/20/21 1014   BP: 134/82   Temp: 98.7 °F (37.1 °C)   TempSrc: Oral   Weight: 236 lb (107 kg)       Objective:    Physical Exam  Vitals signs reviewed. Constitutional:       Appearance: She is well-developed. She is obese. HENT:      Head: Normocephalic. Eyes:      Pupils: Pupils are equal, round, and reactive to light. Neck:      Musculoskeletal: Normal range of motion. Cardiovascular:      Rate and Rhythm: Normal rate and regular rhythm. Pulmonary:      Effort: Pulmonary effort is normal.      Breath sounds: Normal breath sounds. Abdominal:      Palpations: Abdomen is soft. Musculoskeletal:      Comments: Decreased range of motion of the left knee adequate healing of the incision   Skin:     General: Skin is warm. Neurological:      Mental Status: She is alert and oriented to person, place, and time. Psychiatric:         Behavior: Behavior normal.         Dat Haas was seen today for other. Diagnoses and all orders for this visit:    Essential hypertension  -     losartan (COZAAR) 50 MG tablet;  Take 1 tablet by mouth daily replaces norvasc    Postoperative examination    Morbid (severe) obesity due to excess calories (HCC)    Vitamin D deficiency        Comments: Entire record gone over with the patient and her sister regarding the hospital admission. Check blood pressure twice a day at home. Change from Norvasc to losartan for fear of edema lower extremities. Aggressive low sugar low-salt low carbohydrate diet. Take vitamin D3 2000 daily. B12 thousand daily. A great deal of time spent reviewing medications, diet, exercise, social issues. Also reviewing the chart before entering the room with patient and finishing charting after leaving patient's room. More than half of that time was spent face to face with the patient in counseling and coordinating care. Follow Up: Return in about 1 month (around 5/20/2021).      Seen by:  Karin Chandler DO

## 2021-04-28 ENCOUNTER — TELEPHONE (OUTPATIENT)
Dept: ORTHOPEDIC SURGERY | Age: 71
End: 2021-04-28

## 2021-04-28 DIAGNOSIS — Z96.652 S/P REVISION OF TOTAL KNEE, LEFT: Primary | ICD-10-CM

## 2021-05-07 DIAGNOSIS — Z96.652 S/P REVISION OF TOTAL KNEE, LEFT: ICD-10-CM

## 2021-05-07 RX ORDER — OXYCODONE HYDROCHLORIDE 5 MG/1
5 TABLET ORAL 2 TIMES DAILY PRN
Qty: 14 TABLET | Refills: 0 | Status: SHIPPED | OUTPATIENT
Start: 2021-05-07 | End: 2021-05-14

## 2021-05-07 NOTE — TELEPHONE ENCOUNTER
Pt left VM requesting refill of Roxicodone.     DATE OF Gavin Begun left total knee arthroplasty with explantation of previous femoral and tibial component and revision of both the femoral and entire tibial component     Order pended and routed for decision and signature    Pharmacy: [facility] [address]

## 2021-05-07 NOTE — TELEPHONE ENCOUNTER
Oxycodone refilled and weaned to BID PRN. Will continue to wean. Controlled Substance Monitoring:    Acute and Chronic Pain Monitoring:   RX Monitoring 5/7/2021   Acute Pain Prescriptions -   Periodic Controlled Substance Monitoring No signs of potential drug abuse or diversion identified.

## 2021-05-19 ENCOUNTER — OFFICE VISIT (OUTPATIENT)
Dept: ORTHOPEDIC SURGERY | Age: 71
End: 2021-05-19
Payer: MEDICARE

## 2021-05-19 ENCOUNTER — HOSPITAL ENCOUNTER (OUTPATIENT)
Dept: GENERAL RADIOLOGY | Age: 71
Discharge: HOME | End: 2021-05-21
Payer: MEDICARE

## 2021-05-19 VITALS — TEMPERATURE: 98.4 F

## 2021-05-19 DIAGNOSIS — Z96.659 HISTORY OF REVISION OF TOTAL KNEE ARTHROPLASTY: Primary | ICD-10-CM

## 2021-05-19 DIAGNOSIS — M17.12 PRIMARY OSTEOARTHRITIS OF LEFT KNEE: Chronic | ICD-10-CM

## 2021-05-19 DIAGNOSIS — Z96.652 S/P REVISION OF TOTAL KNEE, LEFT: ICD-10-CM

## 2021-05-19 PROCEDURE — 73560 X-RAY EXAM OF KNEE 1 OR 2: CPT

## 2021-05-19 PROCEDURE — 99024 POSTOP FOLLOW-UP VISIT: CPT | Performed by: PHYSICIAN ASSISTANT

## 2021-05-19 PROCEDURE — 99212 OFFICE O/P EST SF 10 MIN: CPT

## 2021-05-19 RX ORDER — METHOCARBAMOL 750 MG/1
750 TABLET, FILM COATED ORAL NIGHTLY PRN
Qty: 30 TABLET | Refills: 1 | Status: SHIPPED | OUTPATIENT
Start: 2021-05-19 | End: 2021-06-18

## 2021-05-19 NOTE — PROGRESS NOTES
Brad Rodriguez is a 65 y.o. female who presents for follow up of left knee    SURGEON: Dr. Bello Trinh MD  Date of Injury/Surgery: 2/22/2021  Date last seen in office: 4/5/2021    Symptoms: better  New complaints: still has aching in the left knee at night when trying to go to sleep    Incision(s): Edges approximated no drainage noted no redness no swelling no tenderness to palpation    Weightbearing: left lower Weight bearing as tolerated      Assistive device Straight cane  Participating in therapy (location if yes)?  yes, [facility]    Refills Needed: Would like to have something for the pain and aching at night, not necessarily pain medication  Order/Referral Needed: N/A

## 2021-05-19 NOTE — PROGRESS NOTES
Chief Complaint   Patient presents with    Follow Up After Procedure     L TKA revision on 2/22/2021        OP:SURGEON: Dr. Suzette Mcghee MD  DATE OF PROCEDURE:  2-22-21  PROCEDURE: Left total knee arthroplasty revision    POD: 3 months    Subjective:  Berta Alvarez is following up from the above surgery. She is WBAT on left lower extremity. She ambulates with assistive device, cane. Pain to extremity is mild and is  taking prescribed pain medication, Percocet. They denies numbness or tingling to the left lower extremity. Denies calf pain, chest pain, or shortness of breath. Patient has finished DVT prophylaxis, ASA 81 mg BID. Patient is  participating in therapy, outpatient therapy. She continues to make progress in physical therapy at [facility]. She states that she really does not have any pain during the day and her left knee but she does get some aching at nighttime and soreness. Review of Systems -    General ROS: negative for - chills, fatigue, fever or night sweats  Respiratory ROS: no cough, shortness of breath, or wheezing  Cardiovascular ROS: no chest pain or dyspnea on exertion  Gastrointestinal ROS: no abdominal pain, nausea, vomiting, diarrhea, constipation,or black or bloody stools  Genitourinary: no hematuria, dysuria, or incontinence   Musculoskeletal ROS: negative for -back or neck pain or stiffness, also see HPI  Neurological ROS: no TIA or stroke symptoms       Objective:    General: Alert and oriented X 3, normocephalic atraumatic, external ears and eye normal, sclera clear, no acute distress, respirations easy and unlabored with no audible wheezes, skin warm and dry, speech and dress appropriate for noted age, affect euthymic.     Extremity:  Left Lower Extremity  Skin clean dry and intact, without signs of infection   Incision well-healed  no edema noted  Compartments supple throughout thigh and leg  Calf supple and not tender  negative Homans  Demonstrates active flexion and extension of the left knee 0 to 100 degrees without pain  She ambulates quite well using a cane for support. States sensation intact to touch in sural, deep peroneal, superficial peroneal, saphenous, posterior tibial  nerve distributions to foot/ankle. Palpable dorsalis pedis and posterior tibialis pulses, cap refill brisk in toes, foot warm/perfused. Temp 98.4 °F (36.9 °C) (Oral)     XR:   Left knee x-rays taken in the office today show the revision left total knee arthroplasty with the hardware in stable position and alignment. No evidence of hardware loosening or failure seen. Assessment:   Diagnosis Orders   1. History of revision of total knee arthroplasty     2. Primary osteoarthritis of left knee         Plan:  Reviewed x-rays with patient today in office     Continue weightbearing as tolerated on the left lower extremity. Continue working in physical therapy on strengthening and range of motion of the left lower extremity and left knee. We will send in a prescription for a muscle relaxer, Robaxin for you to use at nighttime. Also recommend purchasing Voltaren gel over-the-counter to use on your left knee as directed. Follow-up in 3 months for reevaluation and x-rays. Call with any questions or concerns. Electronically signed by ADRIANNA Cole on 5/19/2021 at 10:06 AM  Note: This report was completed using [employer] voiced recognition software. Every effort has been made to ensure accuracy; however, inadvertent computerized transcription errors may be present.

## 2021-05-29 ENCOUNTER — OFFICE VISIT (OUTPATIENT)
Dept: PRIMARY CARE CLINIC | Age: 71
End: 2021-05-29
Payer: MEDICARE

## 2021-05-29 DIAGNOSIS — E11.9 DIET-CONTROLLED DIABETES MELLITUS (HCC): ICD-10-CM

## 2021-05-29 DIAGNOSIS — M81.0 AGE-RELATED OSTEOPOROSIS WITHOUT CURRENT PATHOLOGICAL FRACTURE: ICD-10-CM

## 2021-05-29 DIAGNOSIS — E78.2 MIXED HYPERLIPIDEMIA: Primary | ICD-10-CM

## 2021-05-29 DIAGNOSIS — I10 ESSENTIAL HYPERTENSION: ICD-10-CM

## 2021-05-29 DIAGNOSIS — E03.9 ACQUIRED HYPOTHYROIDISM: ICD-10-CM

## 2021-05-29 PROCEDURE — 3017F COLORECTAL CA SCREEN DOC REV: CPT | Performed by: FAMILY MEDICINE

## 2021-05-29 PROCEDURE — 1090F PRES/ABSN URINE INCON ASSESS: CPT | Performed by: FAMILY MEDICINE

## 2021-05-29 PROCEDURE — G8428 CUR MEDS NOT DOCUMENT: HCPCS | Performed by: FAMILY MEDICINE

## 2021-05-29 PROCEDURE — G8399 PT W/DXA RESULTS DOCUMENT: HCPCS | Performed by: FAMILY MEDICINE

## 2021-05-29 PROCEDURE — 99214 OFFICE O/P EST MOD 30 MIN: CPT | Performed by: FAMILY MEDICINE

## 2021-05-29 PROCEDURE — 1123F ACP DISCUSS/DSCN MKR DOCD: CPT | Performed by: FAMILY MEDICINE

## 2021-05-29 PROCEDURE — G8417 CALC BMI ABV UP PARAM F/U: HCPCS | Performed by: FAMILY MEDICINE

## 2021-05-29 PROCEDURE — 2022F DILAT RTA XM EVC RTNOPTHY: CPT | Performed by: FAMILY MEDICINE

## 2021-05-29 PROCEDURE — 3044F HG A1C LEVEL LT 7.0%: CPT | Performed by: FAMILY MEDICINE

## 2021-05-29 PROCEDURE — 4040F PNEUMOC VAC/ADMIN/RCVD: CPT | Performed by: FAMILY MEDICINE

## 2021-05-29 PROCEDURE — 1036F TOBACCO NON-USER: CPT | Performed by: FAMILY MEDICINE

## 2021-05-29 RX ORDER — LOSARTAN POTASSIUM 50 MG/1
50 TABLET ORAL DAILY
Qty: 90 TABLET | Refills: 5 | Status: SHIPPED
Start: 2021-05-29 | End: 2021-07-27 | Stop reason: SDUPTHER

## 2021-05-29 NOTE — PROGRESS NOTES
21  Name: Carmelina Valdez    : 1950    Sex: female    Age: 65 y.o. Subjective:  Chief Complaint: Patient is here for hospital discharge and 1 month follow-up regarding blood pressure edema. She is doing well. Her left knee is doing well for  surgery. Her pain is medically improved she is ambulating better. She feels her last weight here was incorrect. Edema is decreased. This is her first blood pressure check on losartan and she is doing well. She is not checking blood pressure at home    No chest pains or shortness of breath      Review of Systems   Constitutional: Negative. HENT: Negative. Eyes: Negative. Respiratory: Negative. Cardiovascular: Negative. Gastrointestinal: Negative. Endocrine: Negative. Genitourinary: Negative. Musculoskeletal:        Left knee doing much better. Skin: Negative. Allergic/Immunologic: Negative. Neurological: Negative. Hematological: Negative. Psychiatric/Behavioral: Negative. Current Outpatient Medications:     losartan (COZAAR) 50 MG tablet, Take 1 tablet by mouth daily replaces norvasc, Disp: 90 tablet, Rfl: 5    methocarbamol (ROBAXIN-750) 750 MG tablet, Take 1 tablet by mouth nightly as needed (Use nightly as needed), Disp: 30 tablet, Rfl: 1    Misc. Devices MISC, 1 each by Does not apply route once for 1 dose Standard folding walker with wheels. Dx: Revision left total knee arthroplasty with explantation of previous femoral and tibial component and revision of both the femoral and entire tibial component. SHANNAN: [employer].  Purchase, Disp: 1 Device, Rfl: 0    lactulose (CHRONULAC) 10 GM/15ML solution, Take 30 mLs by mouth daily as needed (constipation unrelieved by other medication), Disp: 1 Bottle, Rfl: 1    sennosides-docusate sodium (SENOKOT-S) 8.6-50 MG tablet, Take 1 tablet by mouth 2 times daily, Disp: 60 tablet, Rfl: 0    aspirin 325 MG EC tablet, Take 1 tablet by mouth 2 times daily (with meals) (Patient taking differently: Take 325 mg by mouth daily ), Disp: 60 tablet, Rfl: 0    Multiple Vitamins-Minerals (CENTRUM SILVER 50+WOMEN PO), Take by mouth, Disp: , Rfl:     buPROPion (WELLBUTRIN XL) 300 MG extended release tablet, Take 1 tablet by mouth every morning, Disp: 90 tablet, Rfl: 5    simvastatin (ZOCOR) 40 MG tablet, Take 1 tablet by mouth nightly, Disp: 90 tablet, Rfl: 5    vitamin D (CHOLECALCIFEROL) 5000 UNITS CAPS capsule, Take 5,000 Units by mouth daily, Disp: , Rfl:     vitamin E 400 UNIT capsule, Take 400 Units by mouth daily. , Disp: , Rfl:     Coenzyme Q10 (CO Q 10 PO), Take 1 tablet by mouth daily. , Disp: , Rfl:   No Known Allergies  Social History     Socioeconomic History    Marital status:      Spouse name: Not on file    Number of children: Not on file    Years of education: Not on file    Highest education level: Not on file   Occupational History    Not on file   Tobacco Use    Smoking status: Never Smoker    Smokeless tobacco: Never Used   Vaping Use    Vaping Use: Never used   Substance and Sexual Activity    Alcohol use:  Yes     Alcohol/week: 1.0 standard drinks     Types: 1 Glasses of wine per week     Comment: socially    Drug use: No    Sexual activity: Not on file   Other Topics Concern    Not on file   Social History Narrative        OA    P HYSTER    OBESITY    LIPID    BILAT TOTAL KNEE [facility]     MILD T R    TICS    LLL PNEUMONIA 11-03    L5 S1 NARROWING    COLON----2-04 5 YRS    BASAL CELL CA R SHOUDLER    L-5----S-1 ADVANCED NARROWING---- 4-09    KITCHEN AT [facility] [address]  1950 Page #2    COLON 3-12 DUNCH---10 YRS    LLL PNEUMONIA 3-12    TMT ABNORMAL  AND SEEN BY DR Kal Kuo AND SAID CHG LIFESTYLE AND REDUCE RISK OR LIKELY    VAS DIS IN FUTURE    RETIRE END MAY 2-    SLEEP STUDY 5-14 ONLY MILE SLEEP APNEA    OA LUMBAR SPINE AND OA HIPS---1-15    LEFT TMJ 4-15    TWO SONS----[employer]    ONE DARYL--WORKS CO  OF [employer]---NOT     LOW BACK PAIN--INJECTIONS DR Omaira Nava 2016    INFLU A 3-17    OA R > L HIPS AND LUMBAR SPINE-- AND GB STONES---- 7-17    GB OUT 8-17--WHO SAID LIVER WAS VERY FATTY DURING SURGERY AND ADVISED AGGRESSIVE WT LOSS    FATTY LIVER 9-17---DR KEARNEY DC PT 1-18    TROCHANTERIC BURSITIS FIGHT HIP--INJECTION DR HENRY 7-17    [facility] 9-17---REFERRED BACK TO DR Omaira Nava    NON HEALING LEFT DISTAL FIBULA AVULSION FX 7-18    L KNEE PAIN DR Surinder Weathers SENT TO DR Destin Liu 1-19 AND TOLD NEEDS REPLACEMENT---PT PLANS JUNE 2019    Neg heart cath   6-19     Social Determinants of Health     Financial Resource Strain:     Difficulty of Paying Living Expenses:    Food Insecurity:     Worried About Running Out of Food in the Last Year:     Ran Out of Food in the Last Year:    Transportation Needs:     Lack of Transportation (Medical):      Lack of Transportation (Non-Medical):    Physical Activity:     Days of Exercise per Week:     Minutes of Exercise per Session:    Stress:     Feeling of Stress :    Social Connections:     Frequency of Communication with Friends and Family:     Frequency of Social Gatherings with Friends and Family:     Attends Anglican Services:     Active Member of Clubs or Organizations:     Attends Club or Organization Meetings:     Marital Status:    Intimate Partner Violence:     Fear of Current or Ex-Partner:     Emotionally Abused:     Physically Abused:     Sexually Abused:       Past Medical History:   Diagnosis Date    Anxiety     Basal cell carcinoma (BCC) of right shoulder     Chronic back pain     Closed avulsion fracture of distal fibula with delayed healing, left     Degenerative joint disease involving multiple joints     Diverticulitis     Hyperlipidemia     Hypertension     Essential hypertension    Liver disease     LLL pneumonia     Obesity     Osteoarthritis     Palpitations 9/17/2012    Sleep apnea     Tricuspid regurgitation     Trochanteric bursitis of right hip     Varicose veins of both lower extremities      Family History   Problem Relation Age of Onset    Lung Cancer Father     Thyroid Disease Sister       Past Surgical History:   Procedure Laterality Date    BREAST SURGERY  1990    cyst lt breast    CARDIAC CATHETERIZATION  06/19/2019    dr Samuel Barr  5/2012    HYSTERECTOMY  1980    JOINT REPLACEMENT  2001    L knee    JOINT REPLACEMENT  2001    rt knee    TOTAL KNEE ARTHROPLASTY Left 2/22/2021    LEFT KNEE TOTAL ARTHROPLASTY REVISION performed by Peter Ibarra MD at [address]:    05/29/21 1131   BP: 138/82   Temp: 98.7 °F (37.1 °C)   TempSrc: Oral   Weight: 265 lb (120.2 kg)       Objective:    Physical Exam  Vitals reviewed. Constitutional:       Appearance: She is well-developed. HENT:      Head: Normocephalic. Eyes:      Pupils: Pupils are equal, round, and reactive to light. Cardiovascular:      Rate and Rhythm: Normal rate and regular rhythm. Pulmonary:      Effort: Pulmonary effort is normal.      Breath sounds: Normal breath sounds. Abdominal:      Palpations: Abdomen is soft. Musculoskeletal:      Cervical back: Normal range of motion. Comments: Only minimal loss of motion left knee. Scar fairly well   Skin:     General: Skin is warm. Neurological:      Mental Status: She is alert and oriented to person, place, and time. Psychiatric:         Behavior: Behavior normal.         Korin Mack was seen today for hypertension. Diagnoses and all orders for this visit:    Mixed hyperlipidemia    Essential hypertension  -     losartan (COZAAR) 50 MG tablet; Take 1 tablet by mouth daily replaces norvasc        Comments: Fat diet. Return is clinically 2 months with blood work a week before. A great deal of time spent reviewing medications, diet, exercise, social issues.  Also reviewing the chart

## 2021-05-31 VITALS
WEIGHT: 265 LBS | TEMPERATURE: 98.7 F | DIASTOLIC BLOOD PRESSURE: 82 MMHG | SYSTOLIC BLOOD PRESSURE: 138 MMHG | BODY MASS INDEX: 44.1 KG/M2

## 2021-05-31 ASSESSMENT — PATIENT HEALTH QUESTIONNAIRE - PHQ9
1. LITTLE INTEREST OR PLEASURE IN DOING THINGS: 0
SUM OF ALL RESPONSES TO PHQ QUESTIONS 1-9: 0

## 2021-05-31 ASSESSMENT — ENCOUNTER SYMPTOMS
ALLERGIC/IMMUNOLOGIC NEGATIVE: 1
EYES NEGATIVE: 1
GASTROINTESTINAL NEGATIVE: 1
RESPIRATORY NEGATIVE: 1

## 2021-07-19 DIAGNOSIS — I10 ESSENTIAL HYPERTENSION: ICD-10-CM

## 2021-07-19 DIAGNOSIS — E03.9 ACQUIRED HYPOTHYROIDISM: ICD-10-CM

## 2021-07-19 DIAGNOSIS — M81.0 AGE-RELATED OSTEOPOROSIS WITHOUT CURRENT PATHOLOGICAL FRACTURE: ICD-10-CM

## 2021-07-19 DIAGNOSIS — E11.9 DIET-CONTROLLED DIABETES MELLITUS (HCC): ICD-10-CM

## 2021-07-19 DIAGNOSIS — E78.2 MIXED HYPERLIPIDEMIA: ICD-10-CM

## 2021-07-19 LAB
ALBUMIN SERPL-MCNC: 4.3 G/DL (ref 3.5–5.2)
ALP BLD-CCNC: 149 U/L (ref 35–104)
ALT SERPL-CCNC: 15 U/L (ref 0–32)
AMORPHOUS: PRESENT
ANION GAP SERPL CALCULATED.3IONS-SCNC: 13 MMOL/L (ref 7–16)
AST SERPL-CCNC: 23 U/L (ref 0–31)
BACTERIA: ABNORMAL /HPF
BASOPHILS ABSOLUTE: 0.03 E9/L (ref 0–0.2)
BASOPHILS RELATIVE PERCENT: 0.5 % (ref 0–2)
BILIRUB SERPL-MCNC: 1.1 MG/DL (ref 0–1.2)
BILIRUBIN URINE: NEGATIVE
BLOOD, URINE: NEGATIVE
BUN BLDV-MCNC: 14 MG/DL (ref 6–23)
CALCIUM SERPL-MCNC: 10 MG/DL (ref 8.6–10.2)
CHLORIDE BLD-SCNC: 105 MMOL/L (ref 98–107)
CHOLESTEROL, TOTAL: 174 MG/DL (ref 0–199)
CLARITY: ABNORMAL
CO2: 24 MMOL/L (ref 22–29)
COLOR: YELLOW
CREAT SERPL-MCNC: 0.8 MG/DL (ref 0.5–1)
EOSINOPHILS ABSOLUTE: 0.3 E9/L (ref 0.05–0.5)
EOSINOPHILS RELATIVE PERCENT: 4.5 % (ref 0–6)
GFR AFRICAN AMERICAN: >60
GFR NON-AFRICAN AMERICAN: >60 ML/MIN/1.73
GLUCOSE BLD-MCNC: 109 MG/DL (ref 74–99)
GLUCOSE URINE: NEGATIVE MG/DL
HBA1C MFR BLD: 5.5 % (ref 4–5.6)
HCT VFR BLD CALC: 44.2 % (ref 34–48)
HDLC SERPL-MCNC: 36 MG/DL
HEMOGLOBIN: 14 G/DL (ref 11.5–15.5)
IMMATURE GRANULOCYTES #: 0.02 E9/L
IMMATURE GRANULOCYTES %: 0.3 % (ref 0–5)
KETONES, URINE: NEGATIVE MG/DL
LDL CHOLESTEROL CALCULATED: 99 MG/DL (ref 0–99)
LEUKOCYTE ESTERASE, URINE: NEGATIVE
LYMPHOCYTES ABSOLUTE: 1.99 E9/L (ref 1.5–4)
LYMPHOCYTES RELATIVE PERCENT: 30 % (ref 20–42)
MCH RBC QN AUTO: 29.3 PG (ref 26–35)
MCHC RBC AUTO-ENTMCNC: 31.7 % (ref 32–34.5)
MCV RBC AUTO: 92.5 FL (ref 80–99.9)
MONOCYTES ABSOLUTE: 0.51 E9/L (ref 0.1–0.95)
MONOCYTES RELATIVE PERCENT: 7.7 % (ref 2–12)
NEUTROPHILS ABSOLUTE: 3.79 E9/L (ref 1.8–7.3)
NEUTROPHILS RELATIVE PERCENT: 57 % (ref 43–80)
NITRITE, URINE: NEGATIVE
PDW BLD-RTO: 13.4 FL (ref 11.5–15)
PH UA: 5.5 (ref 5–9)
PLATELET # BLD: 284 E9/L (ref 130–450)
PMV BLD AUTO: 11.4 FL (ref 7–12)
POTASSIUM SERPL-SCNC: 4.9 MMOL/L (ref 3.5–5)
PROTEIN UA: NEGATIVE MG/DL
RBC # BLD: 4.78 E12/L (ref 3.5–5.5)
RBC UA: ABNORMAL /HPF (ref 0–2)
SODIUM BLD-SCNC: 142 MMOL/L (ref 132–146)
SPECIFIC GRAVITY UA: >=1.03 (ref 1–1.03)
T4 TOTAL: 8.1 MCG/DL (ref 4.5–11.7)
TOTAL PROTEIN: 7.3 G/DL (ref 6.4–8.3)
TRIGL SERPL-MCNC: 197 MG/DL (ref 0–149)
TSH SERPL DL<=0.05 MIU/L-ACNC: 2.01 UIU/ML (ref 0.27–4.2)
UROBILINOGEN, URINE: 0.2 E.U./DL
VITAMIN D 25-HYDROXY: 30 NG/ML (ref 30–100)
VLDLC SERPL CALC-MCNC: 39 MG/DL
WBC # BLD: 6.6 E9/L (ref 4.5–11.5)
WBC UA: ABNORMAL /HPF (ref 0–5)

## 2021-07-27 ENCOUNTER — OFFICE VISIT (OUTPATIENT)
Dept: PRIMARY CARE CLINIC | Age: 71
End: 2021-07-27
Payer: MEDICARE

## 2021-07-27 VITALS — TEMPERATURE: 98.8 F | SYSTOLIC BLOOD PRESSURE: 134 MMHG | DIASTOLIC BLOOD PRESSURE: 82 MMHG

## 2021-07-27 DIAGNOSIS — I10 ESSENTIAL HYPERTENSION: Chronic | ICD-10-CM

## 2021-07-27 DIAGNOSIS — E78.2 MIXED HYPERLIPIDEMIA: ICD-10-CM

## 2021-07-27 DIAGNOSIS — F41.9 ANXIETY: ICD-10-CM

## 2021-07-27 DIAGNOSIS — M17.0 PRIMARY OSTEOARTHRITIS OF BOTH KNEES: ICD-10-CM

## 2021-07-27 DIAGNOSIS — E03.9 ACQUIRED HYPOTHYROIDISM: ICD-10-CM

## 2021-07-27 DIAGNOSIS — R73.03 PRE-DIABETES: ICD-10-CM

## 2021-07-27 DIAGNOSIS — M81.0 AGE-RELATED OSTEOPOROSIS WITHOUT CURRENT PATHOLOGICAL FRACTURE: ICD-10-CM

## 2021-07-27 DIAGNOSIS — Z00.00 ROUTINE GENERAL MEDICAL EXAMINATION AT A HEALTH CARE FACILITY: Primary | ICD-10-CM

## 2021-07-27 PROCEDURE — 1123F ACP DISCUSS/DSCN MKR DOCD: CPT | Performed by: FAMILY MEDICINE

## 2021-07-27 PROCEDURE — 3017F COLORECTAL CA SCREEN DOC REV: CPT | Performed by: FAMILY MEDICINE

## 2021-07-27 PROCEDURE — G0439 PPPS, SUBSEQ VISIT: HCPCS | Performed by: FAMILY MEDICINE

## 2021-07-27 PROCEDURE — 4040F PNEUMOC VAC/ADMIN/RCVD: CPT | Performed by: FAMILY MEDICINE

## 2021-07-27 RX ORDER — SIMVASTATIN 40 MG
40 TABLET ORAL NIGHTLY
Qty: 90 TABLET | Refills: 5 | Status: SHIPPED
Start: 2021-07-27 | End: 2022-07-26 | Stop reason: SDUPTHER

## 2021-07-27 RX ORDER — BUPROPION HYDROCHLORIDE 300 MG/1
300 TABLET ORAL EVERY MORNING
Qty: 90 TABLET | Refills: 5 | Status: SHIPPED
Start: 2021-07-27 | End: 2022-01-07 | Stop reason: SDUPTHER

## 2021-07-27 RX ORDER — LOSARTAN POTASSIUM 50 MG/1
50 TABLET ORAL DAILY
Qty: 90 TABLET | Refills: 5 | Status: SHIPPED
Start: 2021-07-27 | End: 2022-07-26 | Stop reason: SDUPTHER

## 2021-07-27 ASSESSMENT — LIFESTYLE VARIABLES
HOW OFTEN DO YOU HAVE A DRINK CONTAINING ALCOHOL: 0
AUDIT TOTAL SCORE: INCOMPLETE
AUDIT-C TOTAL SCORE: INCOMPLETE
HOW OFTEN DO YOU HAVE A DRINK CONTAINING ALCOHOL: NEVER

## 2021-07-27 ASSESSMENT — PATIENT HEALTH QUESTIONNAIRE - PHQ9
1. LITTLE INTEREST OR PLEASURE IN DOING THINGS: 0
SUM OF ALL RESPONSES TO PHQ QUESTIONS 1-9: 0
SUM OF ALL RESPONSES TO PHQ QUESTIONS 1-9: 0
2. FEELING DOWN, DEPRESSED OR HOPELESS: 0
SUM OF ALL RESPONSES TO PHQ9 QUESTIONS 1 & 2: 0
SUM OF ALL RESPONSES TO PHQ QUESTIONS 1-9: 0

## 2021-07-27 NOTE — PATIENT INSTRUCTIONS
Personalized Preventive Plan for Irven Opitz - 7/27/2021  Medicare offers a range of preventive health benefits. Some of the tests and screenings are paid in full while other may be subject to a deductible, co-insurance, and/or copay. Some of these benefits include a comprehensive review of your medical history including lifestyle, illnesses that may run in your family, and various assessments and screenings as appropriate. After reviewing your medical record and screening and assessments performed today your provider may have ordered immunizations, labs, imaging, and/or referrals for you. A list of these orders (if applicable) as well as your Preventive Care list are included within your After Visit Summary for your review. Other Preventive Recommendations:    · A preventive eye exam performed by an eye specialist is recommended every 1-2 years to screen for glaucoma; cataracts, macular degeneration, and other eye disorders. · A preventive dental visit is recommended every 6 months. · Try to get at least 150 minutes of exercise per week or 10,000 steps per day on a pedometer . · Order or download the FREE \"Exercise & Physical Activity: Your Everyday Guide\" from [employer]. Call 8-359.169.7625 or search [employer] online. · You need 0644-0194 mg of calcium and 5905-8134 IU of vitamin D per day. It is possible to meet your calcium requirement with diet alone, but a vitamin D supplement is usually necessary to meet this goal.  · When exposed to the sun, use a sunscreen that protects against both UVA and UVB radiation with an SPF of 30 or greater. Reapply every 2 to 3 hours or after sweating, drying off with a towel, or swimming. · Always wear a seat belt when traveling in a car. Always wear a helmet when riding a bicycle or motorcycle.

## 2021-07-27 NOTE — PROGRESS NOTES
Medicare Annual Wellness Visit  Name: Sena [facility] Date: 2021   MRN: [id number] Sex: Female   Age: 65 y.o. Ethnicity: Non- / Non    : 1950 Race: White (non-)      Jayy Henriquez is here for Medicare AWV and Discuss Labs (6m)  and bp  arhtriotis   Chol  anc      Screenings for behavioral, psychosocial and functional/safety risks, and cognitive dysfunction are all negative except as indicated below. These results, as well as other patient data from the [address] form, are documented in Flowsheets linked to this Encounter. No Known Allergies      Prior to Visit Medications    Medication Sig Taking? Authorizing Provider   losartan (COZAAR) 50 MG tablet Take 1 tablet by mouth daily replaces norvasc Yes Manan Gallegos DO   simvastatin (ZOCOR) 40 MG tablet Take 1 tablet by mouth nightly Yes Manan Gallegos DO   buPROPion (WELLBUTRIN XL) 300 MG extended release tablet Take 1 tablet by mouth every morning Yes Manan Gallegos DO   aspirin 325 MG EC tablet Take 1 tablet by mouth 2 times daily (with meals)  Patient taking differently: Take 325 mg by mouth daily   Alicja Roach, DO   Multiple Vitamins-Minerals (CENTRUM SILVER 50+WOMEN PO) Take by mouth  Historical Provider, MD   vitamin D (CHOLECALCIFEROL) 5000 UNITS CAPS capsule Take 5,000 Units by mouth daily  Historical Provider, MD   vitamin E 400 UNIT capsule Take 400 Units by mouth daily. Historical Provider, MD   Coenzyme Q10 (CO Q 10 PO) Take 1 tablet by mouth daily.     Historical Provider, MD         Past Medical History:   Diagnosis Date    Anxiety     Basal cell carcinoma (BCC) of right shoulder     Chronic back pain     Closed avulsion fracture of distal fibula with delayed healing, left     Degenerative joint disease involving multiple joints     Diverticulitis     Hyperlipidemia     Hypertension     Essential hypertension    Liver disease     LLL pneumonia     Obesity     Osteoarthritis     Palpitations 9/17/2012    Sleep apnea     Tricuspid regurgitation     Trochanteric bursitis of right hip     Varicose veins of both lower extremities        Past Surgical History:   Procedure Laterality Date    BREAST SURGERY  1990    cyst lt breast    CARDIAC CATHETERIZATION  06/19/2019    dr Isa Bowden  5/2012    HYSTERECTOMY  1980    JOINT REPLACEMENT  2001    L knee    JOINT REPLACEMENT  2001    rt knee    TOTAL KNEE ARTHROPLASTY Left 2/22/2021    LEFT KNEE TOTAL ARTHROPLASTY REVISION performed by Tasneem Dumont MD at [facility] OR         Family History   Problem Relation Age of Onset    Lung Cancer Father     Thyroid Disease Sister        CareTeam (Including outside providers/suppliers regularly involved in providing care):   Patient Care Team:  Beandrew Degree, DO as PCP - General (Family Medicine)  Shashi Degree, DO as PCP - [address] [place] [address]  [facility]  Raul Urban MD as Consulting Physician (Cardiology)  Florina Finn MD as Surgeon (Vascular Surgery)  Zeny Hogan MD as Obstetrician (Obstetrics & Gynecology)  Robbie Meza MD (Optometry)  Sintia Horton MD as Surgeon (Orthopedic Surgery)    Wt Readings from Last 3 Encounters:   05/29/21 265 lb (120.2 kg)   04/20/21 236 lb (107 kg)   02/24/21 260 lb (117.9 kg)     Vitals:    07/27/21 0958   BP: 134/82   Temp: 98.8 °F (37.1 °C)   TempSrc: Oral     There is no height or weight on file to calculate BMI. Based upon direct observation of the patient, evaluation of cognition reveals recent and remote memory intact.     General Appearance: alert and oriented to person, place and time, well developed and well- nourished, in no acute distress  Skin: warm and dry, no rash or erythema  Head: normocephalic and atraumatic  Eyes: pupils equal, round, and reactive to light, extraocular eye movements intact, conjunctivae normal  ENT: tympanic membrane, external ear and ear canal normal bilaterally, nose without deformity, nasal mucosa and turbinates normal without polyps  Neck: supple and non-tender without mass, no thyromegaly or thyroid nodules, no cervical lymphadenopathy  Pulmonary/Chest: clear to auscultation bilaterally- no wheezes, rales or rhonchi, normal air movement, no respiratory distress  Cardiovascular: normal rate, regular rhythm, normal S1 and S2, no murmurs, rubs, clicks, or gallops, distal pulses intact, no carotid bruits  Abdomen: soft, non-tender, non-distended, normal bowel sounds, no masses or organomegaly  Extremities: no cyanosis, clubbing or edema  Musculoskeletal: normal range of motion, no joint swelling, deformity or tenderness  Neurologic: reflexes normal and symmetric, no cranial nerve deficit, gait, coordination and speech normal    Patient's complete Health Risk Assessment and screening values have been reviewed and are found in Flowsheets. The following problems were reviewed today and where indicated follow up appointments were made and/or referrals ordered. Positive Risk Factor Screenings with Interventions:            General Health and ACP:  General  In general, how would you say your health is?: Good  In the past 7 days, have you experienced any of the following?  New or Increased Pain, New or Increased Fatigue, Loneliness, Social Isolation, Stress or Anger?: None of These  Do you get the social and emotional support that you need?: Yes  Do you have a Living Will?: Yes  Advance Directives     Power of [address] [place] [address] Will ACP-Advance Directive ACP-Power of     Not on File Not on File Not on File Not on File      General Health Risk Interventions:  · none    Health Habits/Nutrition:  Health Habits/Nutrition  Do you exercise for at least 20 minutes 2-3 times per week?: (!) No  Have you lost any weight without trying in the past 3 months?: No  Do you eat only one meal per day?: No  Have you seen the dentist within the past year?: Yes     Health Habits/Nutrition Interventions:  · Inadequate physical activity:  patient agrees to exercise for at least 150 minutes/week       Personalized Preventive Plan   Current Health Maintenance Status  Immunization History   Administered Date(s) Administered    COVID-19, Moderna, PF, 100mcg/0.5mL 04/21/2021, 05/19/2021    Influenza Vaccine, unspecified formulation 11/23/2010    Influenza Virus Vaccine 11/23/2010    Pneumococcal Polysaccharide ([id number]) 11/06/2019    Tetanus Toxoid, absorbed 03/23/2008        Health Maintenance   Topic Date Due    Hepatitis C screen  Never done    DTaP/Tdap/Td vaccine (1 - Tdap) Never done    Shingles Vaccine (1 of 2) Never done   [employer] Visit (AWV)  Never done    Breast cancer screen  02/04/2021    Flu vaccine (1) 09/01/2021    Colon cancer screen colonoscopy  03/12/2022    A1C test (Diabetic or Prediabetic)  07/19/2022    Lipid screen  07/19/2022    Potassium monitoring  07/19/2022    Creatinine monitoring  07/19/2022    DEXA (modify frequency per FRAX score)  Completed    Pneumococcal 65+ years Vaccine  Completed    COVID-19 Vaccine  Completed    Hepatitis A vaccine  Aged Out    Hepatitis B vaccine  Aged Out    Hib vaccine  Aged Out    Meningococcal (ACWY) vaccine  Aged Out     Recommendations for PolyRemedy Due: see orders and patient instructions/AVS.  . Recommended screening schedule for the next 5-10 years is provided to the patient in written form: see Patient Instructions/AVS.    Lili Odell was seen today for medicare awv and discuss labs. Diagnoses and all orders for this visit:    Routine general medical examination at a health care facility    Essential hypertension  -     losartan (COZAAR) 50 MG tablet; Take 1 tablet by mouth daily replaces norvasc    Mixed hyperlipidemia    Primary osteoarthritis of both knees    Anxiety  -     buPROPion (WELLBUTRIN XL) 300 MG extended release tablet;  Take 1 tablet by mouth every morning    Other orders  -     simvastatin (ZOCOR) 40 MG tablet; Take 1 tablet by mouth nightly                 low fat and sugar  Diet  Hm  Issues    lsoe wt  Los  Carb diet  A great deal of time spent reviewing medications, diet, exercise, social issues. Also reviewing the chart before entering the room with patient and finishing charting after leaving patient's room. More than half of that time was spent face to face with the patient in counseling and coordinating care. Check blood pressure at home twice a day. Low-salt low caffeine diet. Call if systolic blood pressure is above 466 and diastolic blood pressures above 85. Only use a upper arm digital cuff. Aggressive low-fat diet. Avoid red meats, greasy fried foods, dairy products. Avoid processed foods. Take cholesterol medications without food. A great deal of time spent reviewing medications, diet, exercise, social issues. Also reviewing the chart before entering the room with patient and finishing charting after leaving patient's room. More than half of that time was spent face to face with the patient in counseling and coordinating care.       6 mo lab  before

## 2021-07-28 ENCOUNTER — TELEPHONE (OUTPATIENT)
Dept: PRIMARY CARE CLINIC | Age: 71
End: 2021-07-28

## 2021-08-27 ENCOUNTER — TELEPHONE (OUTPATIENT)
Dept: ORTHOPEDIC SURGERY | Age: 71
End: 2021-08-27

## 2021-08-27 DIAGNOSIS — Z96.659 HISTORY OF REVISION OF TOTAL KNEE ARTHROPLASTY: Primary | ICD-10-CM

## 2021-09-01 ENCOUNTER — OFFICE VISIT (OUTPATIENT)
Dept: ORTHOPEDIC SURGERY | Age: 71
End: 2021-09-01
Payer: MEDICARE

## 2021-09-01 ENCOUNTER — HOSPITAL ENCOUNTER (OUTPATIENT)
Dept: GENERAL RADIOLOGY | Age: 71
Discharge: HOME | End: 2021-09-03
Payer: MEDICARE

## 2021-09-01 VITALS — TEMPERATURE: 97.4 F

## 2021-09-01 DIAGNOSIS — Z96.659 HISTORY OF REVISION OF TOTAL KNEE ARTHROPLASTY: ICD-10-CM

## 2021-09-01 DIAGNOSIS — M17.12 PRIMARY OSTEOARTHRITIS OF LEFT KNEE: Primary | Chronic | ICD-10-CM

## 2021-09-01 PROCEDURE — 1090F PRES/ABSN URINE INCON ASSESS: CPT | Performed by: ORTHOPAEDIC SURGERY

## 2021-09-01 PROCEDURE — 99213 OFFICE O/P EST LOW 20 MIN: CPT | Performed by: ORTHOPAEDIC SURGERY

## 2021-09-01 PROCEDURE — 73560 X-RAY EXAM OF KNEE 1 OR 2: CPT

## 2021-09-01 PROCEDURE — 1036F TOBACCO NON-USER: CPT | Performed by: ORTHOPAEDIC SURGERY

## 2021-09-01 PROCEDURE — 4040F PNEUMOC VAC/ADMIN/RCVD: CPT | Performed by: ORTHOPAEDIC SURGERY

## 2021-09-01 PROCEDURE — G8399 PT W/DXA RESULTS DOCUMENT: HCPCS | Performed by: ORTHOPAEDIC SURGERY

## 2021-09-01 PROCEDURE — G8427 DOCREV CUR MEDS BY ELIG CLIN: HCPCS | Performed by: ORTHOPAEDIC SURGERY

## 2021-09-01 PROCEDURE — 99212 OFFICE O/P EST SF 10 MIN: CPT

## 2021-09-01 PROCEDURE — 1123F ACP DISCUSS/DSCN MKR DOCD: CPT | Performed by: ORTHOPAEDIC SURGERY

## 2021-09-01 PROCEDURE — 3017F COLORECTAL CA SCREEN DOC REV: CPT | Performed by: ORTHOPAEDIC SURGERY

## 2021-09-01 PROCEDURE — G8417 CALC BMI ABV UP PARAM F/U: HCPCS | Performed by: ORTHOPAEDIC SURGERY

## 2021-09-01 NOTE — PROGRESS NOTES
Yohana Gracia is a 65 y.o. female who presents for follow up of LEFT KNEE TOTAL ARTHROPLASTY REVISION    SURGEON: Dr. Nichelle Allen MD  Date of Injury/Surgery: 02/22/2021  Date last seen in office: 05/19/2021    Symptoms: better  New complaints: patient states she is sore and stiff today due to the weather; 3/10 pain      Weightbearing: left lower Full weight bearing      Assistive device No Device  Participating in therapy (location if yes)?  no    Refills Needed: None  Order/Referral Needed: no

## 2021-09-01 NOTE — PATIENT INSTRUCTIONS
Continue activity as tolerated    Follow-up in about a year or so    Call sooner with any questions or concerns

## 2021-09-01 NOTE — PROGRESS NOTES
Chief Complaint   Patient presents with    Follow-up     LEFT KNEE TOTAL ARTHROPLASTY REVISION; dos: 02/22/2021    Knee Pain     left knee tka revision; 3/10 pain; patient states the weather is affecting her knee pain today; yesterday she had no pain       SUBJECTIVE: Patient is here for follow-up on a left total knee revision of both components. She is doing very well. She has very little to no pain. Her revision was done about 6 months ago. She is walking without assistance. She is happy with her result. She is getting very little to no pain over one spot she has some pain in her tibia but is very short-lived. She denies any falls or traumas. She denies any fevers chills or paresthesias. Review of Systems -   General ROS: negative for - chills, fatigue, fever or night sweats  Respiratory ROS: no cough, shortness of breath, or wheezing  Cardiovascular ROS: no chest pain or dyspnea on exertion  Gastrointestinal ROS: no abdominal pain, change in bowel habits, or black or bloody stools  Genitourinary: no hematuria, dysuria, or incontinence   Musculoskeletal ROS:see above  Neurological ROS: no TIA or stroke symptoms       OBJECTIVE:   Alert and oriented X 3, no acute distress, respirations easy and unlabored with no audible wheezes, skin warm and dry, speech and dress appropriate for noted age, affect euthymic. Extremity:  Left knee   Skin intact. Surgical incision well-healed with no signs of infection   No effusion noted. No tenderness to palpation   Stable to varus and valgus at 30 degrees of flexion. Stable on anterior and posterior draw   Compartments soft and compressible. NVID. 2/4 DP and PT pulses. Good patellar tracking with no clunking   Calves soft and NT.     5/5 EHL, TA, GS. Range of motion is 0 to 120 degrees active      XR: 9/1/21   X-ray shows components well fixed. She has healed her fractures about her proximal tibia. Her alignment appears anatomic.       Temp 97.4 °F (36.3 °C)     ASSESSMENT:     Diagnosis Orders   1.  Primary osteoarthritis of left knee         PLAN:    *    Follow-up in 1 year for x-rays    Call sooner with any questions or concerns ELECTRONICALLY signed by:    Fly Ladd MD  9/1/21

## 2022-01-07 DIAGNOSIS — F41.9 ANXIETY: ICD-10-CM

## 2022-01-07 RX ORDER — BUPROPION HYDROCHLORIDE 300 MG/1
300 TABLET ORAL EVERY MORNING
Qty: 90 TABLET | Refills: 3 | Status: SHIPPED
Start: 2022-01-07 | End: 2022-07-26 | Stop reason: SDUPTHER

## 2022-01-19 DIAGNOSIS — I10 ESSENTIAL HYPERTENSION: Chronic | ICD-10-CM

## 2022-01-19 DIAGNOSIS — M81.0 AGE-RELATED OSTEOPOROSIS WITHOUT CURRENT PATHOLOGICAL FRACTURE: ICD-10-CM

## 2022-01-19 DIAGNOSIS — E78.2 MIXED HYPERLIPIDEMIA: ICD-10-CM

## 2022-01-19 DIAGNOSIS — R73.03 PRE-DIABETES: ICD-10-CM

## 2022-01-19 DIAGNOSIS — E03.9 ACQUIRED HYPOTHYROIDISM: ICD-10-CM

## 2022-01-19 LAB
ALBUMIN SERPL-MCNC: 4.3 G/DL (ref 3.5–5.2)
ALP BLD-CCNC: 142 U/L (ref 35–104)
ALT SERPL-CCNC: 16 U/L (ref 0–32)
ANION GAP SERPL CALCULATED.3IONS-SCNC: 14 MMOL/L (ref 7–16)
AST SERPL-CCNC: 22 U/L (ref 0–31)
BASOPHILS ABSOLUTE: 0.04 E9/L (ref 0–0.2)
BASOPHILS RELATIVE PERCENT: 0.6 % (ref 0–2)
BILIRUB SERPL-MCNC: 0.7 MG/DL (ref 0–1.2)
BILIRUBIN URINE: NEGATIVE
BLOOD, URINE: NEGATIVE
BUN BLDV-MCNC: 15 MG/DL (ref 6–23)
CALCIUM SERPL-MCNC: 9.5 MG/DL (ref 8.6–10.2)
CHLORIDE BLD-SCNC: 103 MMOL/L (ref 98–107)
CHOLESTEROL, TOTAL: 173 MG/DL (ref 0–199)
CLARITY: CLEAR
CO2: 21 MMOL/L (ref 22–29)
COLOR: YELLOW
CREAT SERPL-MCNC: 0.7 MG/DL (ref 0.5–1)
EOSINOPHILS ABSOLUTE: 0.24 E9/L (ref 0.05–0.5)
EOSINOPHILS RELATIVE PERCENT: 3.7 % (ref 0–6)
GFR AFRICAN AMERICAN: >60
GFR NON-AFRICAN AMERICAN: >60 ML/MIN/1.73
GLUCOSE BLD-MCNC: 105 MG/DL (ref 74–99)
GLUCOSE URINE: NEGATIVE MG/DL
HBA1C MFR BLD: 5.4 % (ref 4–5.6)
HCT VFR BLD CALC: 44.7 % (ref 34–48)
HDLC SERPL-MCNC: 39 MG/DL
HEMOGLOBIN: 14.6 G/DL (ref 11.5–15.5)
IMMATURE GRANULOCYTES #: 0.04 E9/L
IMMATURE GRANULOCYTES %: 0.6 % (ref 0–5)
KETONES, URINE: NEGATIVE MG/DL
LDL CHOLESTEROL CALCULATED: 104 MG/DL (ref 0–99)
LEUKOCYTE ESTERASE, URINE: NEGATIVE
LYMPHOCYTES ABSOLUTE: 1.97 E9/L (ref 1.5–4)
LYMPHOCYTES RELATIVE PERCENT: 30 % (ref 20–42)
MCH RBC QN AUTO: 30 PG (ref 26–35)
MCHC RBC AUTO-ENTMCNC: 32.7 % (ref 32–34.5)
MCV RBC AUTO: 92 FL (ref 80–99.9)
MONOCYTES ABSOLUTE: 0.51 E9/L (ref 0.1–0.95)
MONOCYTES RELATIVE PERCENT: 7.8 % (ref 2–12)
NEUTROPHILS ABSOLUTE: 3.76 E9/L (ref 1.8–7.3)
NEUTROPHILS RELATIVE PERCENT: 57.3 % (ref 43–80)
NITRITE, URINE: NEGATIVE
PDW BLD-RTO: 13 FL (ref 11.5–15)
PH UA: 5.5 (ref 5–9)
PLATELET # BLD: 267 E9/L (ref 130–450)
PMV BLD AUTO: 11.4 FL (ref 7–12)
POTASSIUM SERPL-SCNC: 4.5 MMOL/L (ref 3.5–5)
PROTEIN UA: NEGATIVE MG/DL
RBC # BLD: 4.86 E12/L (ref 3.5–5.5)
SODIUM BLD-SCNC: 138 MMOL/L (ref 132–146)
SPECIFIC GRAVITY UA: 1.02 (ref 1–1.03)
T4 TOTAL: 7.1 MCG/DL (ref 4.5–11.7)
TOTAL PROTEIN: 7.3 G/DL (ref 6.4–8.3)
TRIGL SERPL-MCNC: 148 MG/DL (ref 0–149)
TSH SERPL DL<=0.05 MIU/L-ACNC: 1.66 UIU/ML (ref 0.27–4.2)
UROBILINOGEN, URINE: 0.2 E.U./DL
VITAMIN D 25-HYDROXY: 28 NG/ML (ref 30–100)
VLDLC SERPL CALC-MCNC: 30 MG/DL
WBC # BLD: 6.6 E9/L (ref 4.5–11.5)

## 2022-01-25 ENCOUNTER — OFFICE VISIT (OUTPATIENT)
Dept: PRIMARY CARE CLINIC | Age: 72
End: 2022-01-25
Payer: MEDICARE

## 2022-01-25 VITALS
DIASTOLIC BLOOD PRESSURE: 83 MMHG | SYSTOLIC BLOOD PRESSURE: 135 MMHG | OXYGEN SATURATION: 98 % | HEART RATE: 88 BPM | TEMPERATURE: 97.9 F

## 2022-01-25 DIAGNOSIS — E55.9 VITAMIN D DEFICIENCY: ICD-10-CM

## 2022-01-25 DIAGNOSIS — M81.0 AGE-RELATED OSTEOPOROSIS WITHOUT CURRENT PATHOLOGICAL FRACTURE: ICD-10-CM

## 2022-01-25 DIAGNOSIS — E11.9 DIET-CONTROLLED DIABETES MELLITUS (HCC): ICD-10-CM

## 2022-01-25 DIAGNOSIS — I10 ESSENTIAL HYPERTENSION: Primary | Chronic | ICD-10-CM

## 2022-01-25 DIAGNOSIS — E03.9 ACQUIRED HYPOTHYROIDISM: ICD-10-CM

## 2022-01-25 DIAGNOSIS — E78.2 MIXED HYPERLIPIDEMIA: ICD-10-CM

## 2022-01-25 PROCEDURE — 99214 OFFICE O/P EST MOD 30 MIN: CPT | Performed by: FAMILY MEDICINE

## 2022-01-25 PROCEDURE — 3017F COLORECTAL CA SCREEN DOC REV: CPT | Performed by: FAMILY MEDICINE

## 2022-01-25 PROCEDURE — 1123F ACP DISCUSS/DSCN MKR DOCD: CPT | Performed by: FAMILY MEDICINE

## 2022-01-25 PROCEDURE — G8427 DOCREV CUR MEDS BY ELIG CLIN: HCPCS | Performed by: FAMILY MEDICINE

## 2022-01-25 PROCEDURE — G8399 PT W/DXA RESULTS DOCUMENT: HCPCS | Performed by: FAMILY MEDICINE

## 2022-01-25 PROCEDURE — G8417 CALC BMI ABV UP PARAM F/U: HCPCS | Performed by: FAMILY MEDICINE

## 2022-01-25 PROCEDURE — 2022F DILAT RTA XM EVC RTNOPTHY: CPT | Performed by: FAMILY MEDICINE

## 2022-01-25 PROCEDURE — 4040F PNEUMOC VAC/ADMIN/RCVD: CPT | Performed by: FAMILY MEDICINE

## 2022-01-25 PROCEDURE — 1036F TOBACCO NON-USER: CPT | Performed by: FAMILY MEDICINE

## 2022-01-25 PROCEDURE — 1090F PRES/ABSN URINE INCON ASSESS: CPT | Performed by: FAMILY MEDICINE

## 2022-01-25 PROCEDURE — 3044F HG A1C LEVEL LT 7.0%: CPT | Performed by: FAMILY MEDICINE

## 2022-01-25 PROCEDURE — G8484 FLU IMMUNIZE NO ADMIN: HCPCS | Performed by: FAMILY MEDICINE

## 2022-01-25 ASSESSMENT — PATIENT HEALTH QUESTIONNAIRE - PHQ9
SUM OF ALL RESPONSES TO PHQ QUESTIONS 1-9: 0
SUM OF ALL RESPONSES TO PHQ9 QUESTIONS 1 & 2: 0
SUM OF ALL RESPONSES TO PHQ QUESTIONS 1-9: 0
2. FEELING DOWN, DEPRESSED OR HOPELESS: 0
SUM OF ALL RESPONSES TO PHQ QUESTIONS 1-9: 0
1. LITTLE INTEREST OR PLEASURE IN DOING THINGS: 0
SUM OF ALL RESPONSES TO PHQ QUESTIONS 1-9: 0

## 2022-01-25 NOTE — PROGRESS NOTES
22  Name: Dash Abdi    : 1950    Sex: female    Age: 66 y.o. Subjective:  Chief Complaint: Patient is here for 6 mock  r e    bp  arth  Chol anx   hands     Hands  Go numb at times   bilat  For few months  No truama    No cp ros ob    Lab with   D down        Review of Systems   Constitutional: Negative. HENT: Negative. Eyes: Negative. Respiratory: Negative. Cardiovascular: Negative. Gastrointestinal: Negative. Endocrine: Negative. Genitourinary: Negative. Musculoskeletal: Positive for arthralgias. Skin: Negative. Allergic/Immunologic: Negative. Neurological: Negative. Hematological: Negative. Psychiatric/Behavioral: Negative. Current Outpatient Medications:     buPROPion (WELLBUTRIN XL) 300 MG extended release tablet, Take 1 tablet by mouth every morning, Disp: 90 tablet, Rfl: 3    losartan (COZAAR) 50 MG tablet, Take 1 tablet by mouth daily replaces norvasc, Disp: 90 tablet, Rfl: 5    simvastatin (ZOCOR) 40 MG tablet, Take 1 tablet by mouth nightly, Disp: 90 tablet, Rfl: 5    Multiple Vitamins-Minerals (CENTRUM SILVER 50+WOMEN PO), Take by mouth, Disp: , Rfl:     vitamin D (CHOLECALCIFEROL) 5000 UNITS CAPS capsule, Take 5,000 Units by mouth daily, Disp: , Rfl:     vitamin E 400 UNIT capsule, Take 400 Units by mouth daily. , Disp: , Rfl:     Coenzyme Q10 (CO Q 10 PO), Take 1 tablet by mouth daily. , Disp: , Rfl:   No Known Allergies  Social History     Socioeconomic History    Marital status:      Spouse name: Not on file    Number of children: Not on file    Years of education: Not on file    Highest education level: Not on file   Occupational History    Not on file   Tobacco Use    Smoking status: Never Smoker    Smokeless tobacco: Never Used   Vaping Use    Vaping Use: Never used   Substance and Sexual Activity    Alcohol use:  Yes     Alcohol/week: 1.0 standard drink     Types: 1 Glasses of wine per week Comment: socially    Drug use: No    Sexual activity: Not on file   Other Topics Concern    Not on file   Social History Narrative        OA    P HYSTER    OBESITY    LIPID    BILAT TOTAL KNEE [facility]     MILD T R    TICS    LLL PNEUMONIA 11-03    L5 S1 NARROWING    COLON----2-04 5 YRS    BASAL CELL CA R SHOUDLER    L-5----S-1 ADVANCED NARROWING---- 4-09    KITCHEN AT [facility] [address]  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     LOW BACK PAIN--INJECTIONS DR Garret Riedel     INFLU A 3-17    OA R > L HIPS AND LUMBAR SPINE-- AND GB STONES----     GB OUT --WHO SAID LIVER WAS VERY FATTY DURING SURGERY AND ADVISED AGGRESSIVE WT LOSS    FATTY LIVER ---DR KEARNEY DC PT     TROCHANTERIC BURSITIS FIGHT HIP--INJECTION DR HENRY     MRI [facility] ---REFERRED BACK TO DR Garret Riedel    NON HEALING LEFT DISTAL FIBULA AVULSION FX     L KNEE PAIN DR Doris Velásquez SENT TO DR SHORT  AND TOLD NEEDS REPLACEMENT---PT PLANS 2019    Neg heart cath       Left total knee   21  dr bennett    Covid       Social Determinants of Health     Financial Resource Strain:     Difficulty of Paying Living Expenses: Not on file   Food Insecurity:     Worried About Running Out of Food in the Last Year: Not on file    Dipika of Food in the Last Year: Not on file   Transportation Needs:     Lack of Transportation (Medical): Not on file    Lack of Transportation (Non-Medical):  Not on file   Physical Activity:     Days of Exercise per Week: Not on file    Minutes of Exercise per Session: Not on file   Stress:     Feeling of Stress : Not on file   Social Connections:     Frequency of Communication with Friends and Family: Not on file    Frequency of Social Gatherings with Friends and Family: Not on file    Attends Caodaism Services: Not on file    Active Member of Clubs or Organizations: Not on file    Attends Club or Organization Meetings: Not on file    Marital Status: Not on file   Intimate Partner Violence:     Fear of Current or Ex-Partner: Not on file    Emotionally Abused: Not on file    Physically Abused: Not on file    Sexually Abused: Not on file   Housing Stability:     Unable to Pay for Housing in the Last Year: Not on file    Number of [place] in the Last Year: Not on file    Unstable Housing in the Last Year: Not on file      Past Medical History:   Diagnosis Date    Anxiety     Basal cell carcinoma (BCC) of right shoulder     Chronic back pain     Closed avulsion fracture of distal fibula with delayed healing, left     Degenerative joint disease involving multiple joints     Diverticulitis     Hyperlipidemia     Hypertension     Essential hypertension    Liver disease     LLL pneumonia     Obesity     Osteoarthritis     Palpitations 9/17/2012    Sleep apnea     Tricuspid regurgitation     Trochanteric bursitis of right hip     Varicose veins of both lower extremities      Family History   Problem Relation Age of Onset    Lung Cancer Father     Thyroid Disease Sister       Past Surgical History:   Procedure Laterality Date    BREAST SURGERY  1990    cyst lt breast    CARDIAC CATHETERIZATION  06/19/2019    dr Joshi Enter  5/2012    Judithann Fass REPLACEMENT  2001    L knee    JOINT REPLACEMENT  2001    rt knee    TOTAL KNEE ARTHROPLASTY Left 2/22/2021    LEFT KNEE TOTAL ARTHROPLASTY REVISION performed by Angelica Preston MD at [address]:    01/25/22 0959   BP: 135/83   Pulse: 88   Temp: 97.9 °F (36.6 °C)   SpO2: 98%       Objective:    Physical Exam  Vitals reviewed. Constitutional:       Appearance: She is well-developed. She is obese. HENT:      Head: Normocephalic. Eyes:      Pupils: Pupils are equal, round, and reactive to light. Cardiovascular:      Rate and Rhythm: Normal rate and regular rhythm. Pulmonary:      Effort: Pulmonary effort is normal.      Breath sounds: Normal breath sounds. Abdominal:      Palpations: Abdomen is soft. Musculoskeletal:         General: Normal range of motion. Cervical back: Normal range of motion. Skin:     General: Skin is warm. Neurological:      Mental Status: She is alert and oriented to person, place, and time. Psychiatric:         Behavior: Behavior normal.         Leonardo Baldwin was seen today for discuss labs. Diagnoses and all orders for this visit:    Essential hypertension    Diet-controlled diabetes mellitus ([address]    Mixed hyperlipidemia    Vitamin D deficiency        Comments: lose wt  Diet exer hm  Issue  lsoe wt  exer   lwo   Fat asguar d iet    Back  d      I educated the patient about all medications. Make sure they were correct and educated  on the risk associated with missing meds or taking them incorrectly. A list of medications is being sent home with patient today. ,m      I informed patient about the risk associated with noncompliance of medication and taking medications incorrectly. Appropriate follow-up with myself and all specialist.  Encourage family members to take active role in assisting with medications and medical care. If any confusion should develop to notify my office immediately to avoid risk of worsening medical condition    A great deal of time spent reviewing medications, diet, exercise, social issues. Also reviewing the chart before entering the room with patient and finishing charting after leaving patient's room.  More than half of that time was spent face to face with the patient in counseling and coordinating care.      Aggressive low-fat diet. Avoid red meats, greasy fried foods, dairy products. Avoid processed foods. Take cholesterol medications without food. Follow Up: Return in about 6 months (around 7/25/2022) for Lab Before.      Seen by:  Silvano Bentley DO

## 2022-02-16 LAB — MAMMOGRAPHY, EXTERNAL: NORMAL

## 2022-07-19 DIAGNOSIS — E03.9 ACQUIRED HYPOTHYROIDISM: ICD-10-CM

## 2022-07-19 DIAGNOSIS — E11.9 DIET-CONTROLLED DIABETES MELLITUS (HCC): ICD-10-CM

## 2022-07-19 DIAGNOSIS — M81.0 AGE-RELATED OSTEOPOROSIS WITHOUT CURRENT PATHOLOGICAL FRACTURE: ICD-10-CM

## 2022-07-19 DIAGNOSIS — E78.2 MIXED HYPERLIPIDEMIA: ICD-10-CM

## 2022-07-19 DIAGNOSIS — I10 ESSENTIAL HYPERTENSION: Chronic | ICD-10-CM

## 2022-07-19 LAB
ALBUMIN SERPL-MCNC: 4.4 G/DL (ref 3.5–5.2)
ALP BLD-CCNC: 128 U/L (ref 35–104)
ALT SERPL-CCNC: 17 U/L (ref 0–32)
ANION GAP SERPL CALCULATED.3IONS-SCNC: 16 MMOL/L (ref 7–16)
AST SERPL-CCNC: 19 U/L (ref 0–31)
BACTERIA: ABNORMAL /HPF
BASOPHILS ABSOLUTE: 0.04 E9/L (ref 0–0.2)
BASOPHILS RELATIVE PERCENT: 0.6 % (ref 0–2)
BILIRUB SERPL-MCNC: 0.6 MG/DL (ref 0–1.2)
BILIRUBIN URINE: NEGATIVE
BLOOD, URINE: NEGATIVE
BUN BLDV-MCNC: 18 MG/DL (ref 6–23)
CALCIUM SERPL-MCNC: 9.6 MG/DL (ref 8.6–10.2)
CHLORIDE BLD-SCNC: 105 MMOL/L (ref 98–107)
CHOLESTEROL, TOTAL: 179 MG/DL (ref 0–199)
CLARITY: NORMAL
CO2: 20 MMOL/L (ref 22–29)
COLOR: YELLOW
CREAT SERPL-MCNC: 0.9 MG/DL (ref 0.5–1)
EOSINOPHILS ABSOLUTE: 0.25 E9/L (ref 0.05–0.5)
EOSINOPHILS RELATIVE PERCENT: 3.6 % (ref 0–6)
GFR AFRICAN AMERICAN: >60
GFR NON-AFRICAN AMERICAN: >60 ML/MIN/1.73
GLUCOSE BLD-MCNC: 105 MG/DL (ref 74–99)
GLUCOSE URINE: NEGATIVE MG/DL
HBA1C MFR BLD: 5.2 % (ref 4–5.6)
HCT VFR BLD CALC: 41.3 % (ref 34–48)
HDLC SERPL-MCNC: 41 MG/DL
HEMOGLOBIN: 13.7 G/DL (ref 11.5–15.5)
IMMATURE GRANULOCYTES #: 0.04 E9/L
IMMATURE GRANULOCYTES %: 0.6 % (ref 0–5)
KETONES, URINE: NEGATIVE MG/DL
LDL CHOLESTEROL CALCULATED: 110 MG/DL (ref 0–99)
LEUKOCYTE ESTERASE, URINE: NEGATIVE
LYMPHOCYTES ABSOLUTE: 2.14 E9/L (ref 1.5–4)
LYMPHOCYTES RELATIVE PERCENT: 30.7 % (ref 20–42)
MCH RBC QN AUTO: 30.2 PG (ref 26–35)
MCHC RBC AUTO-ENTMCNC: 33.2 % (ref 32–34.5)
MCV RBC AUTO: 91 FL (ref 80–99.9)
MONOCYTES ABSOLUTE: 0.54 E9/L (ref 0.1–0.95)
MONOCYTES RELATIVE PERCENT: 7.7 % (ref 2–12)
NEUTROPHILS ABSOLUTE: 3.96 E9/L (ref 1.8–7.3)
NEUTROPHILS RELATIVE PERCENT: 56.8 % (ref 43–80)
NITRITE, URINE: NEGATIVE
PDW BLD-RTO: 13.4 FL (ref 11.5–15)
PH UA: 5.5 (ref 5–9)
PLATELET # BLD: 275 E9/L (ref 130–450)
PMV BLD AUTO: 11.5 FL (ref 7–12)
POTASSIUM SERPL-SCNC: 4.4 MMOL/L (ref 3.5–5)
PROTEIN UA: NEGATIVE MG/DL
RBC # BLD: 4.54 E12/L (ref 3.5–5.5)
RBC UA: ABNORMAL /HPF (ref 0–2)
SODIUM BLD-SCNC: 141 MMOL/L (ref 132–146)
SPECIFIC GRAVITY UA: 1.02 (ref 1–1.03)
T4 TOTAL: 7.3 MCG/DL (ref 4.5–11.7)
TOTAL PROTEIN: 7.2 G/DL (ref 6.4–8.3)
TRIGL SERPL-MCNC: 142 MG/DL (ref 0–149)
TSH SERPL DL<=0.05 MIU/L-ACNC: 2.11 UIU/ML (ref 0.27–4.2)
UROBILINOGEN, URINE: 0.2 E.U./DL
VITAMIN D 25-HYDROXY: 30 NG/ML (ref 30–100)
VLDLC SERPL CALC-MCNC: 28 MG/DL
WBC # BLD: 7 E9/L (ref 4.5–11.5)
WBC UA: ABNORMAL /HPF (ref 0–5)

## 2022-07-26 ENCOUNTER — OFFICE VISIT (OUTPATIENT)
Dept: PRIMARY CARE CLINIC | Age: 72
End: 2022-07-26
Payer: MEDICARE

## 2022-07-26 ENCOUNTER — TELEPHONE (OUTPATIENT)
Dept: PRIMARY CARE CLINIC | Age: 72
End: 2022-07-26

## 2022-07-26 VITALS
HEIGHT: 65 IN | BODY MASS INDEX: 41.82 KG/M2 | WEIGHT: 251 LBS | TEMPERATURE: 99.5 F | SYSTOLIC BLOOD PRESSURE: 144 MMHG | DIASTOLIC BLOOD PRESSURE: 85 MMHG

## 2022-07-26 DIAGNOSIS — M54.50 CHRONIC RIGHT-SIDED LOW BACK PAIN WITHOUT SCIATICA: ICD-10-CM

## 2022-07-26 DIAGNOSIS — E66.01 OBESITY, CLASS III, BMI 40-49.9 (MORBID OBESITY) (HCC): ICD-10-CM

## 2022-07-26 DIAGNOSIS — M81.0 AGE-RELATED OSTEOPOROSIS WITHOUT CURRENT PATHOLOGICAL FRACTURE: ICD-10-CM

## 2022-07-26 DIAGNOSIS — E11.9 DIET-CONTROLLED DIABETES MELLITUS (HCC): ICD-10-CM

## 2022-07-26 DIAGNOSIS — E78.2 MIXED HYPERLIPIDEMIA: ICD-10-CM

## 2022-07-26 DIAGNOSIS — I10 ESSENTIAL HYPERTENSION: Primary | Chronic | ICD-10-CM

## 2022-07-26 DIAGNOSIS — R05.9 COUGH: ICD-10-CM

## 2022-07-26 DIAGNOSIS — F41.9 ANXIETY: ICD-10-CM

## 2022-07-26 DIAGNOSIS — G89.29 CHRONIC RIGHT-SIDED LOW BACK PAIN WITHOUT SCIATICA: ICD-10-CM

## 2022-07-26 DIAGNOSIS — R01.1 HEART MURMUR: ICD-10-CM

## 2022-07-26 PROCEDURE — 1090F PRES/ABSN URINE INCON ASSESS: CPT | Performed by: FAMILY MEDICINE

## 2022-07-26 PROCEDURE — 3017F COLORECTAL CA SCREEN DOC REV: CPT | Performed by: FAMILY MEDICINE

## 2022-07-26 PROCEDURE — G8428 CUR MEDS NOT DOCUMENT: HCPCS | Performed by: FAMILY MEDICINE

## 2022-07-26 PROCEDURE — 2022F DILAT RTA XM EVC RTNOPTHY: CPT | Performed by: FAMILY MEDICINE

## 2022-07-26 PROCEDURE — 3044F HG A1C LEVEL LT 7.0%: CPT | Performed by: FAMILY MEDICINE

## 2022-07-26 PROCEDURE — 1036F TOBACCO NON-USER: CPT | Performed by: FAMILY MEDICINE

## 2022-07-26 PROCEDURE — G8399 PT W/DXA RESULTS DOCUMENT: HCPCS | Performed by: FAMILY MEDICINE

## 2022-07-26 PROCEDURE — 99214 OFFICE O/P EST MOD 30 MIN: CPT | Performed by: FAMILY MEDICINE

## 2022-07-26 PROCEDURE — 1123F ACP DISCUSS/DSCN MKR DOCD: CPT | Performed by: FAMILY MEDICINE

## 2022-07-26 PROCEDURE — G8417 CALC BMI ABV UP PARAM F/U: HCPCS | Performed by: FAMILY MEDICINE

## 2022-07-26 RX ORDER — SIMVASTATIN 40 MG
40 TABLET ORAL NIGHTLY
Qty: 90 TABLET | Refills: 5 | Status: SHIPPED | OUTPATIENT
Start: 2022-07-26

## 2022-07-26 RX ORDER — LOSARTAN POTASSIUM 50 MG/1
50 TABLET ORAL DAILY
Qty: 90 TABLET | Refills: 5 | Status: SHIPPED | OUTPATIENT
Start: 2022-07-26

## 2022-07-26 RX ORDER — BUPROPION HYDROCHLORIDE 300 MG/1
300 TABLET ORAL EVERY MORNING
Qty: 90 TABLET | Refills: 3 | Status: SHIPPED | OUTPATIENT
Start: 2022-07-26

## 2022-07-26 ASSESSMENT — PATIENT HEALTH QUESTIONNAIRE - PHQ9
SUM OF ALL RESPONSES TO PHQ QUESTIONS 1-9: 0
1. LITTLE INTEREST OR PLEASURE IN DOING THINGS: 0
SUM OF ALL RESPONSES TO PHQ QUESTIONS 1-9: 0
2. FEELING DOWN, DEPRESSED OR HOPELESS: 0
SUM OF ALL RESPONSES TO PHQ9 QUESTIONS 1 & 2: 0
SUM OF ALL RESPONSES TO PHQ QUESTIONS 1-9: 0
SUM OF ALL RESPONSES TO PHQ QUESTIONS 1-9: 0

## 2022-07-26 ASSESSMENT — ENCOUNTER SYMPTOMS
RESPIRATORY NEGATIVE: 1
GASTROINTESTINAL NEGATIVE: 1
EYES NEGATIVE: 1
ALLERGIC/IMMUNOLOGIC NEGATIVE: 1

## 2022-07-26 NOTE — TELEPHONE ENCOUNTER
The patient know the chest x-ray is normal except for the heart size is just a teeny bit on the large side. Most likely related to weight. We will see how the echocardiogram turns out.   But nothing there that would explain her cough

## 2022-07-26 NOTE — PROGRESS NOTES
22  Name: Jodie Luque    : 1950    Sex: female    Age: 66 y.o. Subjective:  Chief Complaint: Patient is here for 6 mo ck     bp  arth chol  anx    arth  cough sinus     Feels ok no  cp or sob       sl tickly cough for months   no cp or sob  Wt dwon  14 lbs     Cry about sister      Review of Systems   Constitutional: Negative. HENT: Negative. Eyes: Negative. Respiratory: Negative. Cardiovascular: Negative. Gastrointestinal: Negative. Endocrine: Negative. Genitourinary: Negative. Musculoskeletal: Negative. Skin: Negative. Allergic/Immunologic: Negative. Neurological: Negative. Hematological: Negative. Psychiatric/Behavioral: Negative. Current Outpatient Medications:     buPROPion (WELLBUTRIN XL) 300 MG extended release tablet, Take 1 tablet by mouth every morning, Disp: 90 tablet, Rfl: 3    simvastatin (ZOCOR) 40 MG tablet, Take 1 tablet by mouth nightly, Disp: 90 tablet, Rfl: 5    losartan (COZAAR) 50 MG tablet, Take 1 tablet by mouth in the morning. replaces norvasc., Disp: 90 tablet, Rfl: 5    Multiple Vitamins-Minerals (CENTRUM SILVER 50+WOMEN PO), Take by mouth, Disp: , Rfl:     vitamin D (CHOLECALCIFEROL) 5000 UNITS CAPS capsule, Take 5,000 Units by mouth daily, Disp: , Rfl:     vitamin E 400 UNIT capsule, Take 400 Units by mouth daily. , Disp: , Rfl:     Coenzyme Q10 (CO Q 10 PO), Take 1 tablet by mouth daily. , Disp: , Rfl:   No Known Allergies  Social History     Socioeconomic History    Marital status:      Spouse name: Not on file    Number of children: Not on file    Years of education: Not on file    Highest education level: Not on file   Occupational History    Not on file   Tobacco Use    Smoking status: Never    Smokeless tobacco: Never   Vaping Use    Vaping Use: Never used   Substance and Sexual Activity    Alcohol use:  Yes     Alcohol/week: 1.0 standard drink     Types: 1 Glasses of wine per week     Comment: socially    Drug use: No    Sexual activity: Not on file   Other Topics Concern    Not on file   Social History Narrative        OA    P HYSTER    OBESITY    LIPID    BILAT TOTAL KNEE [facility]     MILD T R    TICS    LLL PNEUMONIA 11-03    L5 S1 NARROWING    COLON----2-04 5 YRS    BASAL CELL CA R SHOUDLER    L-5----S-1 ADVANCED NARROWING---- 4-09    KITCHEN AT [facility] [address]  1950 Page #2    COLON 3-12 DUNCH---10 YRS    LLL PNEUMONIA 3-12    TMT ABNORMAL  AND SEEN BY DR Carlos Wadsworth AND SAID CHG LIFESTYLE AND REDUCE RISK OR LIKELY    VAS DIS IN FUTURE    RETIRE END MAY 2-14    SLEEP STUDY - ONLY MILE SLEEP APNEA    OA LUMBAR SPINE AND OA HIPS---1-15    LEFT TMJ -15    TWO SONS----[employer]    ONE [employer]ARY[employer]--WORKS CO  OF [employer]---NOT     LOW BACK PAIN--INJECTIONS DR Perez Common     INFLU A 3-17    OA R > L HIPS AND LUMBAR SPINE-- AND GB STONES----     GB OUT --WHO SAID LIVER WAS VERY FATTY DURING SURGERY AND ADVISED AGGRESSIVE WT LOSS    FATTY LIVER ---DR KEARNEY DC PT     TROCHANTERIC BURSITIS FIGHT HIP--INJECTION DR HENRY     MRI [facility] ---REFERRED BACK TO DR GARCIA------dr escudero shots    NON HEALING LEFT DISTAL FIBULA AVULSION FX     L KNEE PAIN DR Rian Escamilla SENT TO DR SHORT  AND TOLD NEEDS REPLACEMENT---PT PLANS 2019    Neg heart cath       Left total knee   21  dr bennett    Shot back  ---told next thing would be an ablation    Covid      Shot left hand dr nascimento      Sis   pulm fibrosis at 65 yrs old     Social Determinants of Health     Financial Resource Strain: Not on file   Food Insecurity: Not on file   Transportation Needs: Not on file   Physical Activity: Not on file   Stress: Not on file   Social Connections: Not on file   Intimate Partner Violence: Not on file   Housing Stability: Not on file      Past Medical History:   Diagnosis Date    Anxiety     Basal cell carcinoma (BCC) of right shoulder     Chronic back pain     Closed avulsion fracture of distal fibula with delayed healing, left     Degenerative joint disease involving multiple joints     Diverticulitis     Hyperlipidemia     Hypertension     Essential hypertension    Liver disease     LLL pneumonia     Obesity     Osteoarthritis     Palpitations 9/17/2012    Sleep apnea     Tricuspid regurgitation     Trochanteric bursitis of right hip     Varicose veins of both lower extremities      Family History   Problem Relation Age of Onset    Lung Cancer Father     Thyroid Disease Sister       Past Surgical History:   Procedure Laterality Date    BREAST SURGERY  1990    cyst lt breast    CARDIAC CATHETERIZATION  06/19/2019    dr Alejandra Herrera  5/2012    HYSTERECTOMY (CERVIX STATUS UNKNOWN)  1980    JOINT REPLACEMENT  2001    L knee    JOINT REPLACEMENT  2001    rt knee    TOTAL KNEE ARTHROPLASTY Left 2/22/2021    LEFT KNEE TOTAL ARTHROPLASTY REVISION performed by Daniel Cisneros MD at [address]:    07/26/22 1010   BP: (!) 144/85   Temp: 99.5 °F (37.5 °C)   TempSrc: Oral   Weight: 251 lb (113.9 kg)   Height: 5' 5\" (1.651 m)       Objective:    Physical Exam  Vitals reviewed. Constitutional:       Appearance: Normal appearance. She is well-developed. HENT:      Head: Normocephalic. Right Ear: Tympanic membrane normal.      Left Ear: Tympanic membrane normal.      Nose: Nose normal.      Mouth/Throat:      Mouth: Mucous membranes are moist.   Eyes:      Pupils: Pupils are equal, round, and reactive to light. Cardiovascular:      Rate and Rhythm: Normal rate and regular rhythm. Heart sounds: Murmur heard. Pulmonary:      Effort: Pulmonary effort is normal.      Breath sounds: Normal breath sounds. Abdominal:      General: Bowel sounds are normal.      Palpations: Abdomen is soft. Musculoskeletal:         General: Normal range of motion.       Cervical back: Normal range of motion. Skin:     General: Skin is warm. Neurological:      Mental Status: She is alert and oriented to person, place, and time. Psychiatric:         Behavior: Behavior normal.       Alyssa Negrete was seen today for discuss labs. Diagnoses and all orders for this visit:    Essential hypertension  -     losartan (COZAAR) 50 MG tablet; Take 1 tablet by mouth in the morning. replaces norvasc. Obesity, Class III, BMI 40-49.9 (morbid obesity) (HCC)    Mixed hyperlipidemia    Heart murmur  -     Echocardiogram complete; Future    Diet-controlled diabetes mellitus ([address]    Chronic right-sided low back pain without sciatica    Cough  -     XR CHEST (2 VW); Future    Anxiety  -     buPROPion (WELLBUTRIN XL) 300 MG extended release tablet; Take 1 tablet by mouth every morning    Other orders  -     simvastatin (ZOCOR) 40 MG tablet; Take 1 tablet by mouth nightly      Comments: echo     due to new heart murmur  diet texer    I educated the patient about all medications. Make sure they were correct and educated  on the risk associated with missing meds or taking them incorrectly. A list of medications is being sent home with patient today. Check blood pressure at home twice a day. Low-salt low caffeine diet. Call if systolic blood pressure is above 237 and diastolic blood pressures above 85. Only use a upper arm digital cuff. Aggressive low-fat diet. Avoid red meats, greasy fried foods, dairy products. Avoid processed foods. Take cholesterol medications without food. I informed patient about the risk associated with noncompliance of medication and taking medications incorrectly. Appropriate follow-up with myself and all specialist.  Encourage family members to take active role in assisting with medications and medical care.   If any confusion should develop to notify my office immediately to avoid risk of worsening medical condition    A great deal of time spent reviewing medications, diet, exercise, social issues. Also reviewing the chart before entering the room with patient and finishing charting after leaving patient's room. More than half of that time was spent face to face with the patient in counseling and coordinating care. Follow Up: Return in about 6 months (around 1/26/2023) for Lab Before.      Seen by:  Gila Goodman, DO

## 2022-08-17 ENCOUNTER — TELEPHONE (OUTPATIENT)
Dept: CARDIOLOGY | Age: 72
End: 2022-08-17

## 2022-08-22 ENCOUNTER — TELEPHONE (OUTPATIENT)
Dept: CARDIOLOGY | Age: 72
End: 2022-08-22

## 2022-08-24 ENCOUNTER — HOSPITAL ENCOUNTER (OUTPATIENT)
Dept: CARDIOLOGY | Age: 72
Discharge: HOME | End: 2022-08-24
Payer: MEDICARE

## 2022-08-24 DIAGNOSIS — R01.1 HEART MURMUR: ICD-10-CM

## 2022-08-24 LAB
LV EF: 65 %
LVEF MODALITY: NORMAL

## 2022-08-24 PROCEDURE — 93307 TTE W/O DOPPLER COMPLETE: CPT

## 2022-08-24 PROCEDURE — 6360000004 HC RX CONTRAST MEDICATION: Performed by: FAMILY MEDICINE

## 2022-08-24 PROCEDURE — 2580000003 HC RX 258: Performed by: FAMILY MEDICINE

## 2022-08-24 RX ORDER — SODIUM CHLORIDE 0.9 % (FLUSH) 0.9 %
10 SYRINGE (ML) INJECTION PRN
Status: DISCONTINUED | OUTPATIENT
Start: 2022-08-24 | End: 2022-08-25 | Stop reason: HOSPADM

## 2022-08-24 RX ADMIN — SODIUM CHLORIDE, PRESERVATIVE FREE 10 ML: 5 INJECTION INTRAVENOUS at 10:51

## 2022-08-24 RX ADMIN — PERFLUTREN 1.1 MG: 6.52 INJECTION, SUSPENSION INTRAVENOUS at 10:47

## 2022-08-24 RX ADMIN — SODIUM CHLORIDE, PRESERVATIVE FREE 10 ML: 5 INJECTION INTRAVENOUS at 10:47

## 2022-08-25 PROBLEM — R05.9 COUGH: Status: RESOLVED | Noted: 2022-07-26 | Resolved: 2022-08-25

## 2022-08-30 ENCOUNTER — TELEPHONE (OUTPATIENT)
Dept: PRIMARY CARE CLINIC | Age: 72
End: 2022-08-30

## 2022-08-30 NOTE — TELEPHONE ENCOUNTER
The patient the echocardiogram of the heart does show mild narrowing of the aortic valve. This is the cause of her heart murmur. Heart is a strong pump though which is a good thing. We will discuss this further in January but most likely we will just repeat this in a year to. Nothing to worry about for now. Weight loss will help to  reduce stress on the heart.

## 2022-11-07 ENCOUNTER — TELEPHONE (OUTPATIENT)
Dept: ADMINISTRATIVE | Age: 72
End: 2022-11-07

## 2022-11-08 NOTE — TELEPHONE ENCOUNTER
Call to pt no new injury, scheduled appt   Future Appointments   Date Time Provider Henrietta Zhao   11/29/2022  9:00 AM MD DEVENDRA Wu [facility]   1/31/2023 10:15 AM DO DEVENDRA Ramesh [facility]

## 2022-11-29 ENCOUNTER — OFFICE VISIT (OUTPATIENT)
Dept: ORTHOPEDIC SURGERY | Age: 72
End: 2022-11-29
Payer: MEDICARE

## 2022-11-29 DIAGNOSIS — Z96.652 S/P REVISION OF TOTAL KNEE, LEFT: ICD-10-CM

## 2022-11-29 DIAGNOSIS — M17.12 PRIMARY OSTEOARTHRITIS OF LEFT KNEE: Primary | ICD-10-CM

## 2022-11-29 PROCEDURE — 99213 OFFICE O/P EST LOW 20 MIN: CPT | Performed by: PHYSICIAN ASSISTANT

## 2022-11-29 PROCEDURE — 1036F TOBACCO NON-USER: CPT | Performed by: PHYSICIAN ASSISTANT

## 2022-11-29 PROCEDURE — G8484 FLU IMMUNIZE NO ADMIN: HCPCS | Performed by: PHYSICIAN ASSISTANT

## 2022-11-29 PROCEDURE — G8399 PT W/DXA RESULTS DOCUMENT: HCPCS | Performed by: PHYSICIAN ASSISTANT

## 2022-11-29 PROCEDURE — 1090F PRES/ABSN URINE INCON ASSESS: CPT | Performed by: PHYSICIAN ASSISTANT

## 2022-11-29 PROCEDURE — G8427 DOCREV CUR MEDS BY ELIG CLIN: HCPCS | Performed by: PHYSICIAN ASSISTANT

## 2022-11-29 PROCEDURE — G8417 CALC BMI ABV UP PARAM F/U: HCPCS | Performed by: PHYSICIAN ASSISTANT

## 2022-11-29 PROCEDURE — 3017F COLORECTAL CA SCREEN DOC REV: CPT | Performed by: PHYSICIAN ASSISTANT

## 2022-11-29 PROCEDURE — 1123F ACP DISCUSS/DSCN MKR DOCD: CPT | Performed by: PHYSICIAN ASSISTANT

## 2022-11-29 NOTE — PROGRESS NOTES
Chief Complaint   Patient presents with    Follow Up After Procedure     S/P LEFT KNEE TOTAL ARTHROPLASTY REVISION (02/22/2021)    Knee Pain     Pt states her L Knee is feeling         OP:SURGEON: Dr. Inessa Barajas MD  DATE OF PROCEDURE: 2-22-21  PROCEDURE: LEFT KNEE TOTAL ARTHROPLASTY REVISION VS. IMPLANTATION OF ANTIBIOTIC SPACER     POD: 21+ months    Subjective:  Monica Tyler is following up from the above surgery. She is WBAT on left lower extremity. She ambulates with no assistive device. Pain to extremity is mild and is not taking prescribed pain medication. They denies numbness or tingling to the left lower extremity. Denies calf pain, chest pain, or shortness of breath. Patient has finished DVT prophylaxis. Patient is not participating in therapy at this time. She is doing well after surgery. She presents today for checkup of her left TKA. States that she does have some intermittent soreness in the proximal third of her shin area. He denies any pain in the knee or femoral areas. He denies clicking or popping in the knee. She just wanted to come in and get checked out to make sure everything was okay with her hardware. Review of Systems -  All pertinent positives/negative in HPI     Objective:    General: Alert and oriented X 3, normocephalic atraumatic, external ears and eye normal, sclera clear, no acute distress, respirations easy and unlabored with no audible wheezes, skin warm and dry, speech and dress appropriate for noted age, affect euthymic.     Extremity:  Left Lower Extremity  Skin clean dry and intact, without signs of infection   Incision well-healed  no edema noted  Compartments supple throughout thigh and leg  Calf supple and not tender  negative Homans  Demonstrates active knee ROM 0-120 without pain  Ambulates with no assistive devices  States sensation intact to touch in sural, deep peroneal, superficial peroneal, saphenous, posterior tibial  nerve distributions to foot/ankle. Palpable dorsalis pedis and posterior tibialis pulses, cap refill brisk in toes, foot warm/perfused. There were no vitals taken for this visit. XR:   2 views left knee demonstrate left total knee arthroplasty with hardware in stable position and alignment. No evidence of hardware loosening or failure. Assessment:   Diagnosis Orders   1. Primary osteoarthritis of left knee  XR KNEE LEFT (1-2 VIEWS)      2. S/P revision of total knee, left  XR KNEE LEFT (1-2 VIEWS)        Plan:  X-rays reviewed and discussed. WBAT LLE    Continue home exercise program.    Follow-up as needed. Call if any questions or concerns. Electronically signed by ADRIANNA Chapman on 11/29/2022 at 9:04 AM  Note: This report was completed using RF Code voiced recognition software. Every effort has been made to ensure accuracy; however, inadvertent computerized transcription errors may be present.

## 2023-01-30 DIAGNOSIS — M81.0 AGE-RELATED OSTEOPOROSIS WITHOUT CURRENT PATHOLOGICAL FRACTURE: ICD-10-CM

## 2023-01-30 DIAGNOSIS — E11.9 DIET-CONTROLLED DIABETES MELLITUS (HCC): ICD-10-CM

## 2023-01-30 DIAGNOSIS — E78.2 MIXED HYPERLIPIDEMIA: ICD-10-CM

## 2023-01-30 LAB
ALBUMIN SERPL-MCNC: 4.6 G/DL (ref 3.5–5.2)
ALP BLD-CCNC: 145 U/L (ref 35–104)
ALT SERPL-CCNC: 22 U/L (ref 0–32)
ANION GAP SERPL CALCULATED.3IONS-SCNC: 17 MMOL/L (ref 7–16)
AST SERPL-CCNC: 27 U/L (ref 0–31)
BASOPHILS ABSOLUTE: 0.04 E9/L (ref 0–0.2)
BASOPHILS RELATIVE PERCENT: 0.6 % (ref 0–2)
BILIRUB SERPL-MCNC: 1.1 MG/DL (ref 0–1.2)
BILIRUBIN URINE: NEGATIVE
BLOOD, URINE: NEGATIVE
BUN BLDV-MCNC: 11 MG/DL (ref 6–23)
CALCIUM SERPL-MCNC: 9.6 MG/DL (ref 8.6–10.2)
CHLORIDE BLD-SCNC: 102 MMOL/L (ref 98–107)
CHOLESTEROL, TOTAL: 167 MG/DL (ref 0–199)
CLARITY: CLEAR
CO2: 22 MMOL/L (ref 22–29)
COLOR: NORMAL
CREAT SERPL-MCNC: 0.8 MG/DL (ref 0.5–1)
EOSINOPHILS ABSOLUTE: 0.19 E9/L (ref 0.05–0.5)
EOSINOPHILS RELATIVE PERCENT: 2.7 % (ref 0–6)
GFR SERPL CREATININE-BSD FRML MDRD: >60 ML/MIN/1.73
GLUCOSE BLD-MCNC: 94 MG/DL (ref 74–99)
GLUCOSE URINE: NEGATIVE MG/DL
HBA1C MFR BLD: 5.5 % (ref 4–5.6)
HCT VFR BLD CALC: 44 % (ref 34–48)
HDLC SERPL-MCNC: 40 MG/DL
HEMOGLOBIN: 14.3 G/DL (ref 11.5–15.5)
IMMATURE GRANULOCYTES #: 0.04 E9/L
IMMATURE GRANULOCYTES %: 0.6 % (ref 0–5)
KETONES, URINE: NEGATIVE MG/DL
LDL CHOLESTEROL CALCULATED: 91 MG/DL (ref 0–99)
LEUKOCYTE ESTERASE, URINE: NEGATIVE
LYMPHOCYTES ABSOLUTE: 1.74 E9/L (ref 1.5–4)
LYMPHOCYTES RELATIVE PERCENT: 25 % (ref 20–42)
MCH RBC QN AUTO: 29.6 PG (ref 26–35)
MCHC RBC AUTO-ENTMCNC: 32.5 % (ref 32–34.5)
MCV RBC AUTO: 91.1 FL (ref 80–99.9)
MONOCYTES ABSOLUTE: 0.45 E9/L (ref 0.1–0.95)
MONOCYTES RELATIVE PERCENT: 6.5 % (ref 2–12)
NEUTROPHILS ABSOLUTE: 4.5 E9/L (ref 1.8–7.3)
NEUTROPHILS RELATIVE PERCENT: 64.6 % (ref 43–80)
NITRITE, URINE: NEGATIVE
PDW BLD-RTO: 13.1 FL (ref 11.5–15)
PH UA: 6 (ref 5–9)
PLATELET # BLD: 256 E9/L (ref 130–450)
PMV BLD AUTO: 11.7 FL (ref 7–12)
POTASSIUM SERPL-SCNC: 3.9 MMOL/L (ref 3.5–5)
PROTEIN UA: NEGATIVE MG/DL
RBC # BLD: 4.83 E12/L (ref 3.5–5.5)
SODIUM BLD-SCNC: 141 MMOL/L (ref 132–146)
SPECIFIC GRAVITY UA: <=1.005 (ref 1–1.03)
TOTAL PROTEIN: 7.6 G/DL (ref 6.4–8.3)
TRIGL SERPL-MCNC: 182 MG/DL (ref 0–149)
UROBILINOGEN, URINE: 0.2 E.U./DL
VITAMIN D 25-HYDROXY: 36 NG/ML (ref 30–100)
VLDLC SERPL CALC-MCNC: 36 MG/DL
WBC # BLD: 7 E9/L (ref 4.5–11.5)

## 2023-02-03 ENCOUNTER — OFFICE VISIT (OUTPATIENT)
Dept: PRIMARY CARE CLINIC | Age: 73
End: 2023-02-03

## 2023-02-03 VITALS
TEMPERATURE: 97.1 F | HEART RATE: 85 BPM | OXYGEN SATURATION: 98 % | BODY MASS INDEX: 45.48 KG/M2 | HEIGHT: 65 IN | WEIGHT: 273 LBS | RESPIRATION RATE: 18 BRPM

## 2023-02-03 DIAGNOSIS — M81.0 AGE-RELATED OSTEOPOROSIS WITHOUT CURRENT PATHOLOGICAL FRACTURE: ICD-10-CM

## 2023-02-03 DIAGNOSIS — E11.9 DIET-CONTROLLED DIABETES MELLITUS (HCC): ICD-10-CM

## 2023-02-03 DIAGNOSIS — E78.2 MIXED HYPERLIPIDEMIA: ICD-10-CM

## 2023-02-03 DIAGNOSIS — E03.9 ACQUIRED HYPOTHYROIDISM: ICD-10-CM

## 2023-02-03 DIAGNOSIS — E66.01 MORBID (SEVERE) OBESITY DUE TO EXCESS CALORIES (HCC): Chronic | ICD-10-CM

## 2023-02-03 DIAGNOSIS — Z00.00 MEDICARE ANNUAL WELLNESS VISIT, SUBSEQUENT: Primary | ICD-10-CM

## 2023-02-03 DIAGNOSIS — I10 ESSENTIAL HYPERTENSION: Chronic | ICD-10-CM

## 2023-02-03 ASSESSMENT — PATIENT HEALTH QUESTIONNAIRE - PHQ9
SUM OF ALL RESPONSES TO PHQ9 QUESTIONS 1 & 2: 0
SUM OF ALL RESPONSES TO PHQ QUESTIONS 1-9: 0
SUM OF ALL RESPONSES TO PHQ QUESTIONS 1-9: 0
2. FEELING DOWN, DEPRESSED OR HOPELESS: 0
1. LITTLE INTEREST OR PLEASURE IN DOING THINGS: 0
SUM OF ALL RESPONSES TO PHQ QUESTIONS 1-9: 0
SUM OF ALL RESPONSES TO PHQ QUESTIONS 1-9: 0

## 2023-02-03 ASSESSMENT — LIFESTYLE VARIABLES: HOW MANY STANDARD DRINKS CONTAINING ALCOHOL DO YOU HAVE ON A TYPICAL DAY: PATIENT DOES NOT DRINK

## 2023-02-03 NOTE — PROGRESS NOTES
Medicare Annual Wellness Visit    Abi Taylor is here for Medicare AWV    Assessment & Plan   Medicare annual wellness visit, subsequent  Essential hypertension  Body mass index (BMI) 45.0-49.9, adult ([address]  Diet-controlled diabetes mellitus ([address]  Mixed hyperlipidemia  Morbid (severe) obesity due to excess calories ([address]    Recommendations for Preventive Services Due: see orders and patient instructions/AVS.  Recommended screening schedule for the next 5-10 years is provided to the patient in written form: see Patient Instructions/AVS.     Return in 6 months (on 8/3/2023), or if symptoms worsen or fail to improve, for Lab Before. Subjective   The following acute and/or chronic problems were also addressed today:   6 mo ck re  bp cchla rth   anx    sinus       weight  arth    Feel ok no cp or sob    Inactive      geno pain  seeing   andrew navas  at         Patient's complete Health Risk Assessment and screening values have been reviewed and are found in Flowsheets. The following problems were reviewed today and where indicated follow up appointments were made and/or referrals ordered. Positive Risk Factor Screenings with Interventions:                 Weight and Activity:  Physical Activity: Inactive    Days of Exercise per Week: 0 days    Minutes of Exercise per Session: 0 min     On average, how many days per week do you engage in moderate to strenuous exercise (like a brisk walk)?: 0 days  Have you lost any weight without trying in the past 3 months?: No  Body mass index: (!) 45.42    Inactivity Interventions:  Patient declined any further interventions or treatment  Obesity Interventions:  Patient declines any further evaluation or treatment                               Objective   Vitals:    02/03/23 0915   Pulse: 85   Resp: 18   Temp: 97.1 °F (36.2 °C)   TempSrc: Temporal   SpO2: 98%   Weight: 273 lb (123.8 kg)   Height: 5' 5\" (1.651 m)      Body mass index is 45.43 kg/m².       General Appearance: alert and oriented to person, place and time, well developed and well- nourished, in no acute distress  Skin: warm and dry, no rash or erythema  Head: normocephalic and atraumatic  Eyes: pupils equal, round, and reactive to light, extraocular eye movements intact, conjunctivae normal  ENT: tympanic membrane, external ear and ear canal normal bilaterally, nose without deformity, nasal mucosa and turbinates normal without polyps  Neck: supple and non-tender without mass, no thyromegaly or thyroid nodules, no cervical lymphadenopathy  Pulmonary/Chest: clear to auscultation bilaterally- no wheezes, rales or rhonchi, normal air movement, no respiratory distress  Cardiovascular: normal rate, regular rhythm, normal S1 and S2, no murmurs, rubs, clicks, or gallops, distal pulses intact, no carotid bruits  Abdomen: soft, non-tender, non-distended, normal bowel sounds, no masses or organomegaly  Extremities: no cyanosis, clubbing or edema  Musculoskeletal: normal range of motion, no joint swelling, deformity or tenderness  Neurologic: reflexes normal and symmetric, no cranial nerve deficit, gait, coordination and speech normal       No Known Allergies  Prior to Visit Medications    Medication Sig Taking? Authorizing Provider   buPROPion (WELLBUTRIN XL) 300 MG extended release tablet Take 1 tablet by mouth every morning  Manan Gallegos DO   simvastatin (ZOCOR) 40 MG tablet Take 1 tablet by mouth nightly  Manan Gallegos DO   losartan (COZAAR) 50 MG tablet Take 1 tablet by mouth in the morning. replaces norvasc. Manan Gallegos DO   Multiple Vitamins-Minerals (CENTRUM SILVER 50+WOMEN PO) Take by mouth  Historical Provider, MD   vitamin D (CHOLECALCIFEROL) 5000 UNITS CAPS capsule Take 5,000 Units by mouth daily  Historical Provider, MD   vitamin E 400 UNIT capsule Take 400 Units by mouth daily. Historical Provider, MD   Coenzyme Q10 (CO Q 10 PO) Take 1 tablet by mouth daily.     Historical Provider, MD CareTeam (Including outside providers/suppliers regularly involved in providing care):   Patient Care Team:  Stew Ruiz DO as PCP - General (Family Medicine)  Stew Ruiz DO as PCP - Empaneled Provider  Ilia Green MD as Consulting Physician (Cardiology)  Richard Redmond MD as Surgeon (Vascular Surgery)  Stevenson Gonzalez MD as Obstetrician (Obstetrics & Gynecology)  Ivan Camejo MD (Optometry)  Lue Rinne, MD as Surgeon (Orthopedic Surgery)     Reviewed and updated this visit:  Tobacco  Allergies  Med Hx  Surg Hx  Soc Hx  Fam Hx         Fu with dr Martha Bermudez  at [facility]--diet exer    he ocnsidering ablationa dn she hesitiant   diet exe rlsoe wt          Rom exer tiah tfor arht   ck bp hoem  cont  losartan   50 qd     zocor  withotu food  back to   pain doc      I educated the patient about all medications. Make sure they were correct and educated  on the risk associated with missing meds or taking them incorrectly. A list of medications is being sent home with patient today. Check blood pressure at home twice a day. Low-salt low caffeine diet. Call if systolic blood pressure is above 715 and diastolic blood pressures above 85. Only use a upper arm digital cuff. I educated the patient about all medications. Make sure they were correct and educated  on the risk associated with missing meds or taking them incorrectly. A list of medications is being sent home with patient today. A great deal of time spent reviewing medications, diet, exercise, social issues. Also reviewing the chart before entering the room with patient and finishing charting after leaving patient's room. More than half of that time was spent face to face with the patient in counseling and coordinating care. Aggressive low-fat diet. Avoid red meats, greasy fried foods, dairy products. Avoid processed foods. Take cholesterol medications without food.     I informed patient about the risk associated with noncompliance of medication and taking medications incorrectly. Appropriate follow-up with myself and all specialist.  Encourage family members to take active role in assisting with medications and medical care.   If any confusion should develop to notify my office immediately to avoid risk of worsening medical condition      Prasanth [address], DO

## 2023-02-03 NOTE — PATIENT INSTRUCTIONS
Advance Directives: Care Instructions  Overview  An advance directive is a legal way to state your wishes at the end of your life. It tells your family and your doctor what to do if you can't say what you want. There are two main types of advance directives. You can change them any time your wishes change. Living will. This form tells your family and your doctor your wishes about life support and other treatment. The form is also called a declaration. Medical power of . This form lets you name a person to make treatment decisions for you when you can't speak for yourself. This person is called a health care agent (health care proxy, health care surrogate). The form is also called a durable power of  for health care. If you do not have an advance directive, decisions about your medical care may be made by a family member, or by a doctor or a  who doesn't know you. It may help to think of an advance directive as a gift to the people who care for you. If you have one, they won't have to make tough decisions by themselves. For more information, including forms for your state, see the [address] website (www.caringinfo.org/planning/advance-directives/). Follow-up care is a key part of your treatment and safety. Be sure to make and go to all appointments, and call your doctor if you are having problems. It's also a good idea to know your test results and keep a list of the medicines you take. What should you include in an advance directive? Many states have a unique advance directive form. (It may ask you to address specific issues.) Or you might use a universal form that's approved by many states. If your form doesn't tell you what to address, it may be hard to know what to include in your advance directive. Use the questions below to help you get started. Who do you want to make decisions about your medical care if you are not able to?   What life-support measures do you want if you have a serious illness that gets worse over time or can't be cured? What are you most afraid of that might happen? (Maybe you're afraid of having pain, losing your independence, or being kept alive by machines.)  Where would you prefer to die? (Your home? A hospital? A nursing home?)  Do you want to donate your organs when you die? Do you want certain [facility] practices performed before you die? When should you call for help? Be sure to contact your doctor if you have any questions. Where can you learn more? Go to http://www.faulkner.com/ and enter R264 to learn more about \"Advance Directives: Care Instructions. \"  Current as of: June 16, 2022               Content Version: 13.5  © 5859-8530 Healthwise, [employer]. Care instructions adapted under license by [facility] ([facility]). If you have questions about a medical condition or this instruction, always ask your healthcare professional. [place] [address] any warranty or liability for your use of this information. Starting a Weight Loss Plan: Care Instructions  Overview     If you're thinking about losing weight, it can be hard to know where to start. Your doctor can help you set up a weight loss plan that best meets your needs. You may want to take a class on nutrition or exercise, or you could join a weight loss support group. If you have questions about how to make changes to your eating or exercise habits, ask your doctor about seeing a registered dietitian or an exercise specialist.  It can be a big challenge to lose weight. But you don't have to make huge changes at once. Make small changes, and stick with them. When those changes become habit, add a few more changes. If you don't think you're ready to make changes right now, try to pick a date in the future. Make an appointment to see your doctor to discuss whether the time is right for you to start a plan. Follow-up care is a key part of your treatment and safety.  Be sure to make and go to all appointments, and call your doctor if you are having problems. It's also a good idea to know your test results and keep a list of the medicines you take. How can you care for yourself at home? Set realistic goals. Many people expect to lose much more weight than is likely. A weight loss of 5% to 10% of your body weight may be enough to improve your health. Get family and friends involved to provide support. Talk to them about why you are trying to lose weight, and ask them to help. They can help by participating in exercise and having meals with you, even if they may be eating something different. Find what works best for you. If you do not have time or do not like to cook, a program that offers meal replacement bars or shakes may be better for you. Or if you like to prepare meals, finding a plan that includes daily menus and recipes may be best.  Ask your doctor about other health professionals who can help you achieve your weight loss goals. A dietitian can help you make healthy changes in your diet. An exercise specialist or  can help you develop a safe and effective exercise program.  A counselor or psychiatrist can help you cope with issues such as depression, anxiety, or family problems that can make it hard to focus on weight loss. Consider joining a support group for people who are trying to lose weight. Your doctor can suggest groups in your area. Where can you learn more? Go to http://www.woods.com/ and enter U357 to learn more about \"Starting a Weight Loss Plan: Care Instructions. \"  Current as of: August 25, 2022               Content Version: 13.5  © 2006-2022 Healthwise, Incorporated. Care instructions adapted under license by [facility] [employer] [facility] ([facility]).  If you have questions about a medical condition or this instruction, always ask your healthcare professional. [address] any warranty or liability for your use of this information. A Healthy Heart: Care Instructions  Your Care Instructions     Coronary artery disease, also called heart disease, occurs when a substance called plaque builds up in the vessels that supply oxygen-rich blood to your heart muscle. This can narrow the blood vessels and reduce blood flow. A heart attack happens when blood flow is completely blocked. A high-fat diet, smoking, and other factors increase the risk of heart disease. Your doctor has found that you have a chance of having heart disease. You can do lots of things to keep your heart healthy. It may not be easy, but you can change your diet, exercise more, and quit smoking. These steps really work to lower your chance of heart disease. Follow-up care is a key part of your treatment and safety. Be sure to make and go to all appointments, and call your doctor if you are having problems. It's also a good idea to know your test results and keep a list of the medicines you take. How can you care for yourself at home? Diet    Use less salt when you cook and eat. This helps lower your blood pressure. Taste food before salting. Add only a little salt when you think you need it. With time, your taste buds will adjust to less salt.     Eat fewer snack items, fast foods, canned soups, and other high-salt, high-fat, processed foods.     Read food labels and try to avoid saturated and trans fats. They increase your risk of heart disease by raising cholesterol levels.     Limit the amount of solid fat-butter, margarine, and shortening-you eat. Use olive, peanut, or canola oil when you cook. Bake, broil, and steam foods instead of frying them.     Eat a variety of fruit and vegetables every day. Dark green, deep orange, red, or yellow fruits and vegetables are especially good for you. Examples include spinach, carrots, peaches, and berries.     Foods high in fiber can reduce your cholesterol and provide important vitamins and minerals.  High-fiber foods include whole-grain cereals and breads, oatmeal, beans, brown rice, citrus fruits, and apples.     Eat lean proteins. Heart-healthy proteins include seafood, lean meats and poultry, eggs, beans, peas, nuts, seeds, and soy products.     Limit drinks and foods with added sugar. These include candy, desserts, and soda pop. Lifestyle changes    If your doctor recommends it, get more exercise. Walking is a good choice. Bit by bit, increase the amount you walk every day. Try for at least 30 minutes on most days of the week. You also may want to swim, bike, or do other activities.     Do not smoke. If you need help quitting, talk to your doctor about stop-smoking programs and medicines. These can increase your chances of quitting for good. Quitting smoking may be the most important step you can take to protect your heart. It is never too late to quit.     Limit alcohol to 2 drinks a day for men and 1 drink a day for women. Too much alcohol can cause health problems.     Manage other health problems such as diabetes, high blood pressure, and high cholesterol. If you think you may have a problem with alcohol or drug use, talk to your doctor. Medicines    Take your medicines exactly as prescribed. Call your doctor if you think you are having a problem with your medicine.     If your doctor recommends aspirin, take the amount directed each day. Make sure you take aspirin and not another kind of pain reliever, such as acetaminophen (Tylenol). When should you call for help? Call 911 if you have symptoms of a heart attack. These may include:    Chest pain or pressure, or a strange feeling in the chest.     Sweating.     Shortness of breath.     Pain, pressure, or a strange feeling in the back, neck, jaw, or upper belly or in one or both shoulders or arms.     Lightheadedness or sudden weakness.     A fast or irregular heartbeat.    After you call 911, the  may tell you to chew 1 adult-strength or 2 to 4 low-dose aspirin. Wait for an ambulance. Do not try to drive yourself. Watch closely for changes in your health, and be sure to contact your doctor if you have any problems. Where can you learn more? Go to http://www.faulkner.com/ and enter F075 to learn more about \"A Healthy Heart: Care Instructions. \"  Current as of: September 7, 2022               Content Version: 13.5  © 2006-2022 [employer]. Care instructions adapted under license by [facility] ([facility]). If you have questions about a medical condition or this instruction, always ask your healthcare professional. [place] [address] any warranty or liability for your use of this information. Personalized Preventive Plan for Ciera Mauro - 2/3/2023  Medicare offers a range of preventive health benefits. Some of the tests and screenings are paid in full while other may be subject to a deductible, co-insurance, and/or copay. Some of these benefits include a comprehensive review of your medical history including lifestyle, illnesses that may run in your family, and various assessments and screenings as appropriate. After reviewing your medical record and screening and assessments performed today your provider may have ordered immunizations, labs, imaging, and/or referrals for you. A list of these orders (if applicable) as well as your Preventive Care list are included within your After Visit Summary for your review. Other Preventive Recommendations:    A preventive eye exam performed by an eye specialist is recommended every 1-2 years to screen for glaucoma; cataracts, macular degeneration, and other eye disorders. A preventive dental visit is recommended every 6 months. Try to get at least 150 minutes of exercise per week or 10,000 steps per day on a pedometer . Order or download the FREE \"Exercise & Physical Activity: Your Everyday Guide\" from [employer].  Call 2-839.477.6927 or search [employer] online. You need 0575-9508 mg of calcium and 9873-0194 IU of vitamin D per day. It is possible to meet your calcium requirement with diet alone, but a vitamin D supplement is usually necessary to meet this goal.  When exposed to the sun, use a sunscreen that protects against both UVA and UVB radiation with an SPF of 30 or greater. Reapply every 2 to 3 hours or after sweating, drying off with a towel, or swimming. Always wear a seat belt when traveling in a car. Always wear a helmet when riding a bicycle or motorcycle.

## 2023-02-23 LAB
MAMMOGRAPHY, EXTERNAL: NORMAL
MAMMOGRAPHY, EXTERNAL: NORMAL

## 2023-03-27 ENCOUNTER — OFFICE VISIT (OUTPATIENT)
Dept: PRIMARY CARE CLINIC | Age: 73
End: 2023-03-27
Payer: MEDICARE

## 2023-03-27 VITALS
BODY MASS INDEX: 44.48 KG/M2 | TEMPERATURE: 99.6 F | WEIGHT: 267 LBS | DIASTOLIC BLOOD PRESSURE: 83 MMHG | HEIGHT: 65 IN | HEART RATE: 91 BPM | OXYGEN SATURATION: 96 % | SYSTOLIC BLOOD PRESSURE: 138 MMHG

## 2023-03-27 DIAGNOSIS — R09.81 HEAD CONGESTION: ICD-10-CM

## 2023-03-27 DIAGNOSIS — J20.8 ACUTE BRONCHITIS DUE TO OTHER SPECIFIED ORGANISMS: Primary | ICD-10-CM

## 2023-03-27 DIAGNOSIS — J01.80 ACUTE NON-RECURRENT SINUSITIS OF OTHER SINUS: ICD-10-CM

## 2023-03-27 DIAGNOSIS — R52 BODY ACHES: ICD-10-CM

## 2023-03-27 DIAGNOSIS — R05.9 COUGH IN ADULT: ICD-10-CM

## 2023-03-27 LAB
INFLUENZA A ANTIBODY: NORMAL
INFLUENZA B ANTIBODY: NORMAL
Lab: NORMAL
PERFORMING INSTRUMENT: NORMAL
QC PASS/FAIL: NORMAL
SARS-COV-2, POC: NORMAL

## 2023-03-27 PROCEDURE — G8417 CALC BMI ABV UP PARAM F/U: HCPCS | Performed by: FAMILY MEDICINE

## 2023-03-27 PROCEDURE — 3017F COLORECTAL CA SCREEN DOC REV: CPT | Performed by: FAMILY MEDICINE

## 2023-03-27 PROCEDURE — 1123F ACP DISCUSS/DSCN MKR DOCD: CPT | Performed by: FAMILY MEDICINE

## 2023-03-27 PROCEDURE — 87804 INFLUENZA ASSAY W/OPTIC: CPT | Performed by: FAMILY MEDICINE

## 2023-03-27 PROCEDURE — 96372 THER/PROPH/DIAG INJ SC/IM: CPT | Performed by: FAMILY MEDICINE

## 2023-03-27 PROCEDURE — 3074F SYST BP LT 130 MM HG: CPT | Performed by: FAMILY MEDICINE

## 2023-03-27 PROCEDURE — 87426 SARSCOV CORONAVIRUS AG IA: CPT | Performed by: FAMILY MEDICINE

## 2023-03-27 PROCEDURE — 99213 OFFICE O/P EST LOW 20 MIN: CPT | Performed by: FAMILY MEDICINE

## 2023-03-27 PROCEDURE — G8428 CUR MEDS NOT DOCUMENT: HCPCS | Performed by: FAMILY MEDICINE

## 2023-03-27 PROCEDURE — 1036F TOBACCO NON-USER: CPT | Performed by: FAMILY MEDICINE

## 2023-03-27 PROCEDURE — 3078F DIAST BP <80 MM HG: CPT | Performed by: FAMILY MEDICINE

## 2023-03-27 PROCEDURE — G8399 PT W/DXA RESULTS DOCUMENT: HCPCS | Performed by: FAMILY MEDICINE

## 2023-03-27 PROCEDURE — G8484 FLU IMMUNIZE NO ADMIN: HCPCS | Performed by: FAMILY MEDICINE

## 2023-03-27 PROCEDURE — 1090F PRES/ABSN URINE INCON ASSESS: CPT | Performed by: FAMILY MEDICINE

## 2023-03-27 RX ORDER — LIDOCAINE HYDROCHLORIDE 10 MG/ML
1 INJECTION, SOLUTION INFILTRATION; PERINEURAL ONCE
Status: COMPLETED | OUTPATIENT
Start: 2023-03-27 | End: 2023-03-27

## 2023-03-27 RX ORDER — CEFTRIAXONE 500 MG/1
500 INJECTION, POWDER, FOR SOLUTION INTRAMUSCULAR; INTRAVENOUS ONCE
Status: COMPLETED | OUTPATIENT
Start: 2023-03-27 | End: 2023-03-27

## 2023-03-27 RX ORDER — METHYLPREDNISOLONE 4 MG/1
TABLET ORAL
Qty: 1 KIT | Refills: 0 | Status: SHIPPED | OUTPATIENT
Start: 2023-03-27

## 2023-03-27 RX ORDER — CEFDINIR 300 MG/1
300 CAPSULE ORAL 2 TIMES DAILY
Qty: 20 CAPSULE | Refills: 0 | Status: SHIPPED | OUTPATIENT
Start: 2023-03-27 | End: 2023-04-06

## 2023-03-27 RX ADMIN — LIDOCAINE HYDROCHLORIDE 1 ML: 10 INJECTION, SOLUTION INFILTRATION; PERINEURAL at 16:39

## 2023-03-27 RX ADMIN — CEFTRIAXONE 500 MG: 500 INJECTION, POWDER, FOR SOLUTION INTRAMUSCULAR; INTRAVENOUS at 16:39

## 2023-03-27 ASSESSMENT — ENCOUNTER SYMPTOMS
ALLERGIC/IMMUNOLOGIC NEGATIVE: 1
GASTROINTESTINAL NEGATIVE: 1
COUGH: 1
RHINORRHEA: 1
EYES NEGATIVE: 1

## 2023-03-27 ASSESSMENT — PATIENT HEALTH QUESTIONNAIRE - PHQ9
SUM OF ALL RESPONSES TO PHQ QUESTIONS 1-9: 0
2. FEELING DOWN, DEPRESSED OR HOPELESS: 0
SUM OF ALL RESPONSES TO PHQ QUESTIONS 1-9: 0
1. LITTLE INTEREST OR PLEASURE IN DOING THINGS: 0
SUM OF ALL RESPONSES TO PHQ QUESTIONS 1-9: 0
SUM OF ALL RESPONSES TO PHQ9 QUESTIONS 1 & 2: 0
SUM OF ALL RESPONSES TO PHQ QUESTIONS 1-9: 0

## 2023-03-27 NOTE — PROGRESS NOTES
Vaping Use: Never used   Substance and Sexual Activity    Alcohol use:  Yes     Alcohol/week: 1.0 standard drink     Types: 1 Glasses of wine per week     Comment: socially    Drug use: No    Sexual activity: Not on file   Other Topics Concern    Not on file   Social History Narrative        OA    P HYSTER    OBESITY    LIPID    BILAT TOTAL KNEE [facility]     MILD T R    TICS    LLL PNEUMONIA 11-03    L5 S1 NARROWING    COLON----2-04 3 YRS    BASAL CELL CA R SHOUDLER    L-5----S-1 ADVANCED NARROWING---- 4-09    KITCHEN AT [facility] [address]  1950 Page #2    COLON 3-12 DUNCH---10 YRS    LLL PNEUMONIA 3-12    TMT ABNORMAL  AND SEEN BY DR Iwona Beasley AND SAID CHG LIFESTYLE AND REDUCE RISK OR LIKELY    VAS DIS IN FUTURE    RETIRE END MAY 2-14    SLEEP STUDY -14 ONLY MILE SLEEP APNEA    OA LUMBAR SPINE AND OA HIPS---1-15    LEFT TMJ 4-15    TWO SONS----Angelantoni AND Jiberish CO    ONE DARYL--WORKS CO  OF [employer]---NOT     LOW BACK PAIN--INJECTIONS DR Caruso Last     INFLU A 3-17    OA R > L HIPS AND LUMBAR SPINE-- AND GB STONES----     GB OUT --WHO SAID LIVER WAS VERY FATTY DURING SURGERY AND ADVISED AGGRESSIVE WT LOSS    FATTY LIVER ---DR KEARNEY DC PT -18    TROCHANTERIC BURSITIS FIGHT HIP--INJECTION DR HENRY     MRI [facility] ---REFERRED BACK TO DR GARCIA------dr escudero shots    NON HEALING LEFT DISTAL FIBULA AVULSION FX     L KNEE PAIN DR Naren Guidry SENT TO DR SHORT  AND TOLD NEEDS REPLACEMENT---PT PLANS 2019    Neg heart cath       Left total knee   -  dr bennett    Shot back  ---told next thing would be an ablation------shots dr Mayelin Sarkar      Shot left hand dr nascimento      Sis  - pulm fibrosis at 65 yrs old     Social Determinants of Health     Financial Resource Strain: Not on file   Food Insecurity: Not on file   Transportation Needs: Not on file   Physical Activity: Inactive    Days of

## 2023-03-30 ENCOUNTER — TELEPHONE (OUTPATIENT)
Dept: PRIMARY CARE CLINIC | Age: 73
End: 2023-03-30

## 2023-03-30 RX ORDER — DEXTROMETHORPHAN HYDROBROMIDE AND PROMETHAZINE HYDROCHLORIDE 15; 6.25 MG/5ML; MG/5ML
5 SYRUP ORAL 4 TIMES DAILY PRN
Qty: 120 ML | Refills: 0 | Status: SHIPPED | OUTPATIENT
Start: 2023-03-30 | End: 2023-04-06

## 2023-03-30 NOTE — TELEPHONE ENCOUNTER
The pt was in to see you 03/27 for cough, congestion, and headache and was prescribed cefdinir and a medrol dosepack and advised if she wasn't feeling better to call and let you know, she is calling to let you know that she isn't feeling any better

## 2023-07-28 DIAGNOSIS — E03.9 ACQUIRED HYPOTHYROIDISM: ICD-10-CM

## 2023-07-28 DIAGNOSIS — E11.9 DIET-CONTROLLED DIABETES MELLITUS (HCC): ICD-10-CM

## 2023-07-28 DIAGNOSIS — M81.0 AGE-RELATED OSTEOPOROSIS WITHOUT CURRENT PATHOLOGICAL FRACTURE: ICD-10-CM

## 2023-07-28 DIAGNOSIS — I10 ESSENTIAL HYPERTENSION: Chronic | ICD-10-CM

## 2023-07-28 DIAGNOSIS — E78.2 MIXED HYPERLIPIDEMIA: ICD-10-CM

## 2023-07-28 LAB
ABSOLUTE IMMATURE GRANULOCYTE: 0.03 K/UL (ref 0–0.58)
ALBUMIN SERPL-MCNC: 4.5 G/DL (ref 3.5–5.2)
ALP BLD-CCNC: 127 U/L (ref 35–104)
ALT SERPL-CCNC: 18 U/L (ref 0–32)
ANION GAP SERPL CALCULATED.3IONS-SCNC: 18 MMOL/L (ref 7–16)
AST SERPL-CCNC: 24 U/L (ref 0–31)
BACTERIA: ABNORMAL
BASOPHILS ABSOLUTE: 0.03 K/UL (ref 0–0.2)
BASOPHILS RELATIVE PERCENT: 1 % (ref 0–2)
BILIRUB SERPL-MCNC: 0.7 MG/DL (ref 0–1.2)
BILIRUBIN URINE: NEGATIVE
BUN BLDV-MCNC: 21 MG/DL (ref 6–23)
CALCIUM SERPL-MCNC: 9.4 MG/DL (ref 8.6–10.2)
CHLORIDE BLD-SCNC: 107 MMOL/L (ref 98–107)
CHOLESTEROL: 160 MG/DL
CO2: 18 MMOL/L (ref 22–29)
COLOR: YELLOW
CREAT SERPL-MCNC: 0.8 MG/DL (ref 0.5–1)
EOSINOPHILS ABSOLUTE: 0.19 K/UL (ref 0.05–0.5)
EOSINOPHILS RELATIVE PERCENT: 3 % (ref 0–6)
EPITHELIAL CELLS UA: ABNORMAL /HPF
GFR SERPL CREATININE-BSD FRML MDRD: >60 ML/MIN/1.73M2
GLUCOSE BLD-MCNC: 101 MG/DL (ref 74–99)
GLUCOSE URINE: NEGATIVE MG/DL
HBA1C MFR BLD: 5.6 % (ref 4–5.6)
HCT VFR BLD CALC: 43.3 % (ref 34–48)
HDLC SERPL-MCNC: 41 MG/DL
HEMOGLOBIN: 13.5 G/DL (ref 11.5–15.5)
IMMATURE GRANULOCYTES: 1 % (ref 0–5)
KETONES, URINE: NEGATIVE MG/DL
LDL CHOLESTEROL: 99 MG/DL
LEUKOCYTE ESTERASE, URINE: NEGATIVE
LYMPHOCYTES ABSOLUTE: 1.26 K/UL (ref 1.5–4)
LYMPHOCYTES RELATIVE PERCENT: 20 % (ref 20–42)
MCH RBC QN AUTO: 29.9 PG (ref 26–35)
MCHC RBC AUTO-ENTMCNC: 31.2 G/DL (ref 32–34.5)
MCV RBC AUTO: 95.8 FL (ref 80–99.9)
MONOCYTES ABSOLUTE: 0.54 K/UL (ref 0.1–0.95)
MONOCYTES RELATIVE PERCENT: 9 % (ref 2–12)
NEUTROPHILS ABSOLUTE: 4.18 K/UL (ref 1.8–7.3)
NEUTROPHILS RELATIVE PERCENT: 67 % (ref 43–80)
NITRITE, URINE: NEGATIVE
PDW BLD-RTO: 13.2 % (ref 11.5–15)
PH UA: 5.5 (ref 5–9)
PLATELET # BLD: 240 K/UL (ref 130–450)
PMV BLD AUTO: 11.8 FL (ref 7–12)
POTASSIUM SERPL-SCNC: 4.7 MMOL/L (ref 3.5–5)
PROTEIN UA: NEGATIVE MG/DL
RBC # BLD: 4.52 M/UL (ref 3.5–5.5)
RBC UA: ABNORMAL /HPF
SODIUM BLD-SCNC: 143 MMOL/L (ref 132–146)
SPECIFIC GRAVITY UA: >1.03 (ref 1–1.03)
T4 TOTAL: 8.8 UG/DL (ref 4.5–11.7)
TOTAL PROTEIN: 7.2 G/DL (ref 6.4–8.3)
TRIGL SERPL-MCNC: 99 MG/DL
TSH SERPL DL<=0.05 MIU/L-ACNC: 1.63 UIU/ML (ref 0.27–4.2)
TURBIDITY: ABNORMAL
URINE HGB: NEGATIVE
UROBILINOGEN, URINE: 0.2 EU/DL (ref 0–1)
VITAMIN D 25-HYDROXY: 26.8 NG/ML (ref 30–100)
VLDLC SERPL CALC-MCNC: 20 MG/DL
WBC # BLD: 6.2 K/UL (ref 4.5–11.5)
WBC UA: ABNORMAL /HPF

## 2023-08-04 ENCOUNTER — OFFICE VISIT (OUTPATIENT)
Dept: PRIMARY CARE CLINIC | Age: 73
End: 2023-08-04

## 2023-08-04 VITALS — BODY MASS INDEX: 44.25 KG/M2 | WEIGHT: 265.6 LBS | TEMPERATURE: 98.1 F | HEIGHT: 65 IN

## 2023-08-04 DIAGNOSIS — E03.9 ACQUIRED HYPOTHYROIDISM: ICD-10-CM

## 2023-08-04 DIAGNOSIS — M81.0 AGE-RELATED OSTEOPOROSIS WITHOUT CURRENT PATHOLOGICAL FRACTURE: ICD-10-CM

## 2023-08-04 DIAGNOSIS — I10 ESSENTIAL HYPERTENSION: Primary | Chronic | ICD-10-CM

## 2023-08-04 DIAGNOSIS — E11.9 DIET-CONTROLLED DIABETES MELLITUS (HCC): ICD-10-CM

## 2023-08-04 DIAGNOSIS — F41.9 ANXIETY: ICD-10-CM

## 2023-08-04 DIAGNOSIS — N64.4 BREAST PAIN, RIGHT: ICD-10-CM

## 2023-08-04 RX ORDER — LOSARTAN POTASSIUM 50 MG/1
50 TABLET ORAL DAILY
Qty: 90 TABLET | Refills: 5 | Status: SHIPPED | OUTPATIENT
Start: 2023-08-04

## 2023-08-04 RX ORDER — SIMVASTATIN 40 MG
40 TABLET ORAL NIGHTLY
Qty: 90 TABLET | Refills: 5 | Status: SHIPPED | OUTPATIENT
Start: 2023-08-04

## 2023-08-04 RX ORDER — BUPROPION HYDROCHLORIDE 300 MG/1
300 TABLET ORAL EVERY MORNING
Qty: 90 TABLET | Refills: 3 | Status: SHIPPED | OUTPATIENT
Start: 2023-08-04

## 2023-08-04 ASSESSMENT — ENCOUNTER SYMPTOMS
ALLERGIC/IMMUNOLOGIC NEGATIVE: 1
EYES NEGATIVE: 1
GASTROINTESTINAL NEGATIVE: 1
ROS SKIN COMMENTS: HPI
RESPIRATORY NEGATIVE: 1
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
## 2023-08-07 LAB — MAMMOGRAPHY, EXTERNAL: NORMAL

## 2023-08-08 ENCOUNTER — TELEPHONE (OUTPATIENT)
Dept: PRIMARY CARE CLINIC | Age: 73
End: 2023-08-08

## 2023-08-08 DIAGNOSIS — R92.2 DENSE BREAST TISSUE ON MAMMOGRAM: Primary | ICD-10-CM

## 2023-08-08 NOTE — TELEPHONE ENCOUNTER
The pt went to get her mammogram yesterday at [facility] and was complaining about pain in her right breast, the radiologist saw something dense and felt she needed a US breast limited right, they are calling to see if they can get an order for it faxed over to them at [phone]

## 2023-08-09 ENCOUNTER — TELEPHONE (OUTPATIENT)
Dept: PRIMARY CARE CLINIC | Age: 73
End: 2023-08-09

## 2023-08-09 NOTE — TELEPHONE ENCOUNTER
Re-faxed Rx for rt breast ultrasound to [phone] per request.  They didn't receive fax yesterday

## 2023-08-17 ENCOUNTER — TELEPHONE (OUTPATIENT)
Dept: PRIMARY CARE CLINIC | Age: 73
End: 2023-08-17

## 2023-08-17 NOTE — TELEPHONE ENCOUNTER
----- Message from [address] [place] [address] sent at 8/17/2023 10:55 AM EDT -----  Subject: Results Request    QUESTIONS  Results: mammogram 08/07/203 & ultrasound 08/08/2023 ; Ordered by: Nicole Flaherty   Date Performed: 2023-08-07  ---------------------------------------------------------------------------  --------------  Lacey Huff INFO    [phone]; OK to leave message on voicemail  ---------------------------------------------------------------------------  --------------

## 2023-08-21 DIAGNOSIS — R92.2 DENSE BREAST TISSUE ON MAMMOGRAM: ICD-10-CM

## 2023-08-22 DIAGNOSIS — U07.1 COVID-19: Primary | ICD-10-CM

## 2023-08-22 RX ORDER — AZITHROMYCIN 250 MG/1
250 TABLET, FILM COATED ORAL SEE ADMIN INSTRUCTIONS
Qty: 6 TABLET | Refills: 0 | Status: SHIPPED | OUTPATIENT
Start: 2023-08-22 | End: 2023-08-27

## 2023-08-22 RX ORDER — METHYLPREDNISOLONE 4 MG/1
TABLET ORAL
Qty: 1 KIT | Refills: 0 | Status: SHIPPED
Start: 2023-08-22 | End: 2023-09-25

## 2023-09-05 ENCOUNTER — HOSPITAL ENCOUNTER (OUTPATIENT)
Dept: MAMMOGRAPHY | Age: 73
Discharge: HOME | End: 2023-09-07
Payer: MEDICARE

## 2023-09-05 DIAGNOSIS — M81.0 AGE-RELATED OSTEOPOROSIS WITHOUT CURRENT PATHOLOGICAL FRACTURE: ICD-10-CM

## 2023-09-05 PROCEDURE — 77080 DXA BONE DENSITY AXIAL: CPT

## 2023-09-25 ENCOUNTER — OFFICE VISIT (OUTPATIENT)
Dept: PRIMARY CARE CLINIC | Age: 73
End: 2023-09-25
Payer: MEDICARE

## 2023-09-25 VITALS
BODY MASS INDEX: 44.35 KG/M2 | HEIGHT: 65 IN | WEIGHT: 266.2 LBS | TEMPERATURE: 97.5 F | DIASTOLIC BLOOD PRESSURE: 82 MMHG | SYSTOLIC BLOOD PRESSURE: 136 MMHG

## 2023-09-25 DIAGNOSIS — S46.212A STRAIN OF LEFT BICEPS, INITIAL ENCOUNTER: ICD-10-CM

## 2023-09-25 DIAGNOSIS — I10 ESSENTIAL HYPERTENSION: Chronic | ICD-10-CM

## 2023-09-25 DIAGNOSIS — M79.601 PAIN OF RIGHT UPPER EXTREMITY: Primary | ICD-10-CM

## 2023-09-25 PROCEDURE — 1123F ACP DISCUSS/DSCN MKR DOCD: CPT | Performed by: FAMILY MEDICINE

## 2023-09-25 PROCEDURE — 1036F TOBACCO NON-USER: CPT | Performed by: FAMILY MEDICINE

## 2023-09-25 PROCEDURE — 3079F DIAST BP 80-89 MM HG: CPT | Performed by: FAMILY MEDICINE

## 2023-09-25 PROCEDURE — 3017F COLORECTAL CA SCREEN DOC REV: CPT | Performed by: FAMILY MEDICINE

## 2023-09-25 PROCEDURE — 99213 OFFICE O/P EST LOW 20 MIN: CPT | Performed by: FAMILY MEDICINE

## 2023-09-25 PROCEDURE — 3075F SYST BP GE 130 - 139MM HG: CPT | Performed by: FAMILY MEDICINE

## 2023-09-25 PROCEDURE — 1090F PRES/ABSN URINE INCON ASSESS: CPT | Performed by: FAMILY MEDICINE

## 2023-09-25 PROCEDURE — G8428 CUR MEDS NOT DOCUMENT: HCPCS | Performed by: FAMILY MEDICINE

## 2023-09-25 PROCEDURE — G8399 PT W/DXA RESULTS DOCUMENT: HCPCS | Performed by: FAMILY MEDICINE

## 2023-09-25 PROCEDURE — G8417 CALC BMI ABV UP PARAM F/U: HCPCS | Performed by: FAMILY MEDICINE

## 2023-09-25 RX ORDER — TRAMADOL HYDROCHLORIDE 50 MG/1
50 TABLET ORAL EVERY 6 HOURS PRN
Qty: 12 TABLET | Refills: 0 | Status: SHIPPED | OUTPATIENT
Start: 2023-09-25 | End: 2023-09-28

## 2023-09-25 RX ORDER — PREDNISONE 10 MG/1
10 TABLET ORAL
Qty: 15 TABLET | Refills: 0 | Status: SHIPPED | OUTPATIENT
Start: 2023-09-25 | End: 2023-09-30

## 2023-09-25 ASSESSMENT — ENCOUNTER SYMPTOMS
GASTROINTESTINAL NEGATIVE: 1
ALLERGIC/IMMUNOLOGIC NEGATIVE: 1
RESPIRATORY NEGATIVE: 1
EYES NEGATIVE: 1

## 2023-09-25 ASSESSMENT — PATIENT HEALTH QUESTIONNAIRE - PHQ9
SUM OF ALL RESPONSES TO PHQ QUESTIONS 1-9: 0
2. FEELING DOWN, DEPRESSED OR HOPELESS: 0
SUM OF ALL RESPONSES TO PHQ QUESTIONS 1-9: 0
1. LITTLE INTEREST OR PLEASURE IN DOING THINGS: 0
SUM OF ALL RESPONSES TO PHQ9 QUESTIONS 1 & 2: 0
SUM OF ALL RESPONSES TO PHQ QUESTIONS 1-9: 0
SUM OF ALL RESPONSES TO PHQ QUESTIONS 1-9: 0

## 2023-09-25 NOTE — PROGRESS NOTES
23  Name: Janina Arredondo    : 1950    Sex: female    Age: 74 y.o. Subjective:  Chief Complaint: Patient is here for right upper arm pain    5  d ago          Few days before albaro t waxhed windows  no truama       Pain   with   motion -  No     rash           Review of Systems   Constitutional: Negative. HENT: Negative. Eyes: Negative. Respiratory: Negative. Cardiovascular: Negative. Gastrointestinal: Negative. Endocrine: Negative. Genitourinary: Negative. Musculoskeletal:         Hpi   Skin: Negative. Allergic/Immunologic: Negative. Neurological: Negative. Hematological: Negative. Psychiatric/Behavioral: Negative. Current Outpatient Medications:     predniSONE (DELTASONE) 10 MG tablet, Take 1 tablet by mouth 3 times daily (with meals) for 5 days, Disp: 15 tablet, Rfl: 0    traMADol (ULTRAM) 50 MG tablet, Take 1 tablet by mouth every 6 hours as needed for Pain for up to 3 days. Tired  do not  drive Max Daily Amount: 200 mg, Disp: 12 tablet, Rfl: 0    buPROPion (WELLBUTRIN XL) 300 MG extended release tablet, Take 1 tablet by mouth every morning, Disp: 90 tablet, Rfl: 3    losartan (COZAAR) 50 MG tablet, Take 1 tablet by mouth daily replaces norvasc, Disp: 90 tablet, Rfl: 5    simvastatin (ZOCOR) 40 MG tablet, Take 1 tablet by mouth nightly, Disp: 90 tablet, Rfl: 5    silver sulfADIAZINE (SILVADENE) 1 % cream, Apply topically qid, Disp: 60 g, Rfl: 5    Multiple Vitamins-Minerals (CENTRUM SILVER 50+WOMEN PO), Take by mouth, Disp: , Rfl:     vitamin D (CHOLECALCIFEROL) 5000 UNITS CAPS capsule, Take 5,000 Units by mouth daily, Disp: , Rfl:     vitamin E 400 UNIT capsule, Take 400 Units by mouth daily. , Disp: , Rfl:     Coenzyme Q10 (CO Q 10 PO), Take 1 tablet by mouth daily.   , Disp: , Rfl:   No Known Allergies  Social History     Socioeconomic History    Marital status:      Spouse name: Not on file    Number of children: Not on file

## 2023-09-26 ENCOUNTER — TELEPHONE (OUTPATIENT)
Dept: PRIMARY CARE CLINIC | Age: 73
End: 2023-09-26

## 2023-10-02 ENCOUNTER — OFFICE VISIT (OUTPATIENT)
Dept: PRIMARY CARE CLINIC | Age: 73
End: 2023-10-02
Payer: MEDICARE

## 2023-10-02 VITALS — TEMPERATURE: 97.4 F | HEIGHT: 65 IN | BODY MASS INDEX: 44.3 KG/M2

## 2023-10-02 DIAGNOSIS — M19.011 PRIMARY OSTEOARTHRITIS OF RIGHT SHOULDER: Primary | ICD-10-CM

## 2023-10-02 DIAGNOSIS — M75.111 NONTRAUMATIC INCOMPLETE TEAR OF RIGHT ROTATOR CUFF: ICD-10-CM

## 2023-10-02 PROCEDURE — 1123F ACP DISCUSS/DSCN MKR DOCD: CPT | Performed by: FAMILY MEDICINE

## 2023-10-02 PROCEDURE — G8484 FLU IMMUNIZE NO ADMIN: HCPCS | Performed by: FAMILY MEDICINE

## 2023-10-02 PROCEDURE — 1036F TOBACCO NON-USER: CPT | Performed by: FAMILY MEDICINE

## 2023-10-02 PROCEDURE — 3017F COLORECTAL CA SCREEN DOC REV: CPT | Performed by: FAMILY MEDICINE

## 2023-10-02 PROCEDURE — G8428 CUR MEDS NOT DOCUMENT: HCPCS | Performed by: FAMILY MEDICINE

## 2023-10-02 PROCEDURE — G8417 CALC BMI ABV UP PARAM F/U: HCPCS | Performed by: FAMILY MEDICINE

## 2023-10-02 PROCEDURE — 1090F PRES/ABSN URINE INCON ASSESS: CPT | Performed by: FAMILY MEDICINE

## 2023-10-02 PROCEDURE — G8399 PT W/DXA RESULTS DOCUMENT: HCPCS | Performed by: FAMILY MEDICINE

## 2023-10-02 PROCEDURE — 99213 OFFICE O/P EST LOW 20 MIN: CPT | Performed by: FAMILY MEDICINE

## 2023-10-02 RX ORDER — PREDNISONE 10 MG/1
TABLET ORAL
Qty: 18 TABLET | Refills: 0 | Status: SHIPPED | OUTPATIENT
Start: 2023-10-02

## 2023-10-02 SDOH — ECONOMIC STABILITY: FOOD INSECURITY: WITHIN THE PAST 12 MONTHS, THE FOOD YOU BOUGHT JUST DIDN'T LAST AND YOU DIDN'T HAVE MONEY TO GET MORE.: NEVER TRUE

## 2023-10-02 SDOH — ECONOMIC STABILITY: HOUSING INSECURITY
IN THE LAST 12 MONTHS, WAS THERE A TIME WHEN YOU DID NOT HAVE A STEADY PLACE TO SLEEP OR SLEPT IN A SHELTER (INCLUDING NOW)?: NO

## 2023-10-02 SDOH — ECONOMIC STABILITY: INCOME INSECURITY: HOW HARD IS IT FOR YOU TO PAY FOR THE VERY BASICS LIKE FOOD, HOUSING, MEDICAL CARE, AND HEATING?: NOT HARD AT ALL

## 2023-10-02 SDOH — ECONOMIC STABILITY: FOOD INSECURITY: WITHIN THE PAST 12 MONTHS, YOU WORRIED THAT YOUR FOOD WOULD RUN OUT BEFORE YOU GOT MONEY TO BUY MORE.: NEVER TRUE

## 2023-10-02 ASSESSMENT — ENCOUNTER SYMPTOMS
ALLERGIC/IMMUNOLOGIC NEGATIVE: 1
GASTROINTESTINAL NEGATIVE: 1
RESPIRATORY NEGATIVE: 1
EYES NEGATIVE: 1

## 2023-10-02 NOTE — PROGRESS NOTES
10/2/23  Name: Ariella Smith    : 1950    Sex: female    Age: 74 y.o. Subjective:  Chief Complaint: Patient is here for  re ck right shoulder     Pred helepd short term     x ray  reivwd with pt  Pred helepd at first        Review of Systems   Constitutional: Negative. HENT: Negative. Eyes: Negative. Respiratory: Negative. Cardiovascular: Negative. Gastrointestinal: Negative. Endocrine: Negative. Genitourinary: Negative. Musculoskeletal:  Positive for arthralgias. Skin: Negative. Allergic/Immunologic: Negative. Neurological: Negative. Hematological: Negative. Psychiatric/Behavioral: Negative. Current Outpatient Medications:     predniSONE (DELTASONE) 10 MG tablet, ONE TID FOR THREE DAYS, ONE BID FOR THREE DAYS, ONE QD FOR THREE DAYS   FOOD, Disp: 18 tablet, Rfl: 0    buPROPion (WELLBUTRIN XL) 300 MG extended release tablet, Take 1 tablet by mouth every morning, Disp: 90 tablet, Rfl: 3    losartan (COZAAR) 50 MG tablet, Take 1 tablet by mouth daily replaces norvasc, Disp: 90 tablet, Rfl: 5    simvastatin (ZOCOR) 40 MG tablet, Take 1 tablet by mouth nightly, Disp: 90 tablet, Rfl: 5    silver sulfADIAZINE (SILVADENE) 1 % cream, Apply topically qid, Disp: 60 g, Rfl: 5    Multiple Vitamins-Minerals (CENTRUM SILVER 50+WOMEN PO), Take by mouth, Disp: , Rfl:     vitamin D (CHOLECALCIFEROL) 5000 UNITS CAPS capsule, Take 5,000 Units by mouth daily, Disp: , Rfl:     vitamin E 400 UNIT capsule, Take 400 Units by mouth daily. , Disp: , Rfl:     Coenzyme Q10 (CO Q 10 PO), Take 1 tablet by mouth daily.   , Disp: , Rfl:   No Known Allergies  Social History     Socioeconomic History    Marital status:      Spouse name: Not on file    Number of children: Not on file    Years of education: Not on file    Highest education level: Not on file   Occupational History    Not on file   Tobacco Use    Smoking status: Never    Smokeless tobacco: Never   Vaping Use

## 2023-10-12 ENCOUNTER — TELEPHONE (OUTPATIENT)
Dept: PRIMARY CARE CLINIC | Age: 73
End: 2023-10-12

## 2023-10-12 DIAGNOSIS — M75.111 NONTRAUMATIC INCOMPLETE TEAR OF RIGHT ROTATOR CUFF: ICD-10-CM

## 2023-10-12 DIAGNOSIS — M79.601 PAIN OF RIGHT UPPER EXTREMITY: ICD-10-CM

## 2023-10-12 DIAGNOSIS — M19.011 PRIMARY OSTEOARTHRITIS OF RIGHT SHOULDER: Primary | ICD-10-CM

## 2023-10-12 NOTE — TELEPHONE ENCOUNTER
You referred the pt to see Dr Ivelisse Thompson for primary osteoarthritis of right shoulder and nontraumatic incomplete tear of right rotator cuff, she has been calling their office for 3 weeks now and no one is calling her back to schedule an appointment , she is calling to see if you can refer her to someone else

## 2023-12-13 ENCOUNTER — OFFICE VISIT (OUTPATIENT)
Dept: PRIMARY CARE CLINIC | Age: 73
End: 2023-12-13
Payer: MEDICARE

## 2023-12-13 VITALS
BODY MASS INDEX: 45.29 KG/M2 | HEIGHT: 65 IN | SYSTOLIC BLOOD PRESSURE: 138 MMHG | TEMPERATURE: 97.3 F | HEART RATE: 92 BPM | DIASTOLIC BLOOD PRESSURE: 78 MMHG | WEIGHT: 271.8 LBS | OXYGEN SATURATION: 96 %

## 2023-12-13 DIAGNOSIS — J20.8 ACUTE BRONCHITIS DUE TO OTHER SPECIFIED ORGANISMS: Primary | ICD-10-CM

## 2023-12-13 DIAGNOSIS — J02.9 SORE THROAT: ICD-10-CM

## 2023-12-13 DIAGNOSIS — R09.81 SINUS CONGESTION: ICD-10-CM

## 2023-12-13 DIAGNOSIS — J01.80 ACUTE NON-RECURRENT SINUSITIS OF OTHER SINUS: ICD-10-CM

## 2023-12-13 LAB
INFLUENZA A ANTIBODY: NEGATIVE
INFLUENZA B ANTIBODY: NEGATIVE
Lab: NORMAL
PERFORMING INSTRUMENT: NORMAL
QC PASS/FAIL: NORMAL
S PYO AG THROAT QL: NORMAL
SARS-COV-2, POC: NORMAL

## 2023-12-13 PROCEDURE — 1123F ACP DISCUSS/DSCN MKR DOCD: CPT | Performed by: FAMILY MEDICINE

## 2023-12-13 PROCEDURE — 3078F DIAST BP <80 MM HG: CPT | Performed by: FAMILY MEDICINE

## 2023-12-13 PROCEDURE — 99213 OFFICE O/P EST LOW 20 MIN: CPT | Performed by: FAMILY MEDICINE

## 2023-12-13 PROCEDURE — 96372 THER/PROPH/DIAG INJ SC/IM: CPT | Performed by: FAMILY MEDICINE

## 2023-12-13 PROCEDURE — 87426 SARSCOV CORONAVIRUS AG IA: CPT | Performed by: FAMILY MEDICINE

## 2023-12-13 PROCEDURE — G8399 PT W/DXA RESULTS DOCUMENT: HCPCS | Performed by: FAMILY MEDICINE

## 2023-12-13 PROCEDURE — 87804 INFLUENZA ASSAY W/OPTIC: CPT | Performed by: FAMILY MEDICINE

## 2023-12-13 PROCEDURE — 1090F PRES/ABSN URINE INCON ASSESS: CPT | Performed by: FAMILY MEDICINE

## 2023-12-13 PROCEDURE — G8428 CUR MEDS NOT DOCUMENT: HCPCS | Performed by: FAMILY MEDICINE

## 2023-12-13 PROCEDURE — G8484 FLU IMMUNIZE NO ADMIN: HCPCS | Performed by: FAMILY MEDICINE

## 2023-12-13 PROCEDURE — G8417 CALC BMI ABV UP PARAM F/U: HCPCS | Performed by: FAMILY MEDICINE

## 2023-12-13 PROCEDURE — 1036F TOBACCO NON-USER: CPT | Performed by: FAMILY MEDICINE

## 2023-12-13 PROCEDURE — 87880 STREP A ASSAY W/OPTIC: CPT | Performed by: FAMILY MEDICINE

## 2023-12-13 PROCEDURE — 3075F SYST BP GE 130 - 139MM HG: CPT | Performed by: FAMILY MEDICINE

## 2023-12-13 PROCEDURE — 3017F COLORECTAL CA SCREEN DOC REV: CPT | Performed by: FAMILY MEDICINE

## 2023-12-13 RX ORDER — CEFDINIR 300 MG/1
300 CAPSULE ORAL 2 TIMES DAILY
Qty: 20 CAPSULE | Refills: 0 | Status: SHIPPED | OUTPATIENT
Start: 2023-12-13 | End: 2023-12-23

## 2023-12-13 RX ORDER — CEFTRIAXONE SODIUM 250 MG/1
250 INJECTION, POWDER, FOR SOLUTION INTRAMUSCULAR; INTRAVENOUS ONCE
Status: COMPLETED | OUTPATIENT
Start: 2023-12-13 | End: 2023-12-13

## 2023-12-13 RX ORDER — PREDNISONE 10 MG/1
10 TABLET ORAL
Qty: 15 TABLET | Refills: 0 | Status: SHIPPED | OUTPATIENT
Start: 2023-12-13 | End: 2023-12-18

## 2023-12-13 RX ADMIN — CEFTRIAXONE SODIUM 250 MG: 250 INJECTION, POWDER, FOR SOLUTION INTRAMUSCULAR; INTRAVENOUS at 16:11

## 2023-12-13 SDOH — ECONOMIC STABILITY: FOOD INSECURITY: WITHIN THE PAST 12 MONTHS, YOU WORRIED THAT YOUR FOOD WOULD RUN OUT BEFORE YOU GOT MONEY TO BUY MORE.: NEVER TRUE

## 2023-12-13 SDOH — ECONOMIC STABILITY: INCOME INSECURITY: HOW HARD IS IT FOR YOU TO PAY FOR THE VERY BASICS LIKE FOOD, HOUSING, MEDICAL CARE, AND HEATING?: NOT HARD AT ALL

## 2023-12-13 SDOH — ECONOMIC STABILITY: FOOD INSECURITY: WITHIN THE PAST 12 MONTHS, THE FOOD YOU BOUGHT JUST DIDN'T LAST AND YOU DIDN'T HAVE MONEY TO GET MORE.: NEVER TRUE

## 2023-12-13 NOTE — PROGRESS NOTES
23  Name: Lauren Nicholson    : 1950    Sex: female    Age: 74 y.o. Subjective:  Chief Complaint: Patient is here for [facility]  sore thraot  con g  sinus     [place] yest   sl chill  Today  no     cp ros ob        Review of Systems   Constitutional: Negative. HENT:  Positive for rhinorrhea. Eyes: Negative. Respiratory:  Positive for cough. Cardiovascular: Negative. Gastrointestinal: Negative. Endocrine: Negative. Genitourinary: Negative. Musculoskeletal: Negative. Skin: Negative. Allergic/Immunologic: Negative. Neurological: Negative. Hematological: Negative. Psychiatric/Behavioral: Negative. Current Outpatient Medications:     cefdinir (OMNICEF) 300 MG capsule, Take 1 capsule by mouth 2 times daily for 10 days, Disp: 20 capsule, Rfl: 0    predniSONE (DELTASONE) 10 MG tablet, Take 1 tablet by mouth 3 times daily (with meals) for 5 days, Disp: 15 tablet, Rfl: 0    predniSONE (DELTASONE) 10 MG tablet, ONE TID FOR THREE DAYS, ONE BID FOR THREE DAYS, ONE QD FOR THREE DAYS   FOOD, Disp: 18 tablet, Rfl: 0    buPROPion (WELLBUTRIN XL) 300 MG extended release tablet, Take 1 tablet by mouth every morning, Disp: 90 tablet, Rfl: 3    losartan (COZAAR) 50 MG tablet, Take 1 tablet by mouth daily replaces norvasc, Disp: 90 tablet, Rfl: 5    simvastatin (ZOCOR) 40 MG tablet, Take 1 tablet by mouth nightly, Disp: 90 tablet, Rfl: 5    silver sulfADIAZINE (SILVADENE) 1 % cream, Apply topically qid, Disp: 60 g, Rfl: 5    Multiple Vitamins-Minerals (CENTRUM SILVER 50+WOMEN PO), Take by mouth, Disp: , Rfl:     vitamin D (CHOLECALCIFEROL) 5000 UNITS CAPS capsule, Take 5,000 Units by mouth daily, Disp: , Rfl:     vitamin E 400 UNIT capsule, Take 400 Units by mouth daily. , Disp: , Rfl:     Coenzyme Q10 (CO Q 10 PO), Take 1 tablet by mouth daily.   , Disp: , Rfl:   No Known Allergies  Social History     Socioeconomic History    Marital status:      Spouse name:

## 2024-02-05 ENCOUNTER — OFFICE VISIT (OUTPATIENT)
Dept: PRIMARY CARE CLINIC | Age: 74
End: 2024-02-05
Payer: MEDICARE

## 2024-02-05 VITALS
DIASTOLIC BLOOD PRESSURE: 83 MMHG | WEIGHT: 274.8 LBS | OXYGEN SATURATION: 99 % | SYSTOLIC BLOOD PRESSURE: 135 MMHG | BODY MASS INDEX: 45.79 KG/M2 | TEMPERATURE: 97.7 F | HEIGHT: 65 IN | HEART RATE: 87 BPM | RESPIRATION RATE: 17 BRPM

## 2024-02-05 DIAGNOSIS — E03.9 ACQUIRED HYPOTHYROIDISM: ICD-10-CM

## 2024-02-05 DIAGNOSIS — E78.2 MIXED HYPERLIPIDEMIA: ICD-10-CM

## 2024-02-05 DIAGNOSIS — R94.31 ABNORMAL EKG: ICD-10-CM

## 2024-02-05 DIAGNOSIS — M81.0 AGE-RELATED OSTEOPOROSIS WITHOUT CURRENT PATHOLOGICAL FRACTURE: ICD-10-CM

## 2024-02-05 DIAGNOSIS — Z01.818 PRE-OP EXAM: ICD-10-CM

## 2024-02-05 DIAGNOSIS — E66.01 MORBID (SEVERE) OBESITY DUE TO EXCESS CALORIES (HCC): Chronic | ICD-10-CM

## 2024-02-05 DIAGNOSIS — I10 ESSENTIAL HYPERTENSION: Chronic | ICD-10-CM

## 2024-02-05 DIAGNOSIS — E11.9 DIET-CONTROLLED DIABETES MELLITUS (HCC): ICD-10-CM

## 2024-02-05 DIAGNOSIS — Z00.00 MEDICARE ANNUAL WELLNESS VISIT, SUBSEQUENT: Primary | ICD-10-CM

## 2024-02-05 LAB
ABSOLUTE IMMATURE GRANULOCYTE: 0.04 K/UL (ref 0–0.58)
ALBUMIN SERPL-MCNC: 4.4 G/DL (ref 3.5–5.2)
ALP BLD-CCNC: 130 U/L (ref 35–104)
ALT SERPL-CCNC: 15 U/L (ref 0–32)
ANION GAP SERPL CALCULATED.3IONS-SCNC: 20 MMOL/L (ref 7–16)
AST SERPL-CCNC: 19 U/L (ref 0–31)
BASOPHILS ABSOLUTE: 0.05 K/UL (ref 0–0.2)
BASOPHILS RELATIVE PERCENT: 1 % (ref 0–2)
BILIRUB SERPL-MCNC: 0.6 MG/DL (ref 0–1.2)
BILIRUBIN URINE: NEGATIVE
BUN BLDV-MCNC: 16 MG/DL (ref 6–23)
CALCIUM SERPL-MCNC: 9.8 MG/DL (ref 8.6–10.2)
CHLORIDE BLD-SCNC: 105 MMOL/L (ref 98–107)
CHOLESTEROL: 173 MG/DL
CO2: 19 MMOL/L (ref 22–29)
COLOR: YELLOW
COMMENT: ABNORMAL
CREAT SERPL-MCNC: 0.8 MG/DL (ref 0.5–1)
EOSINOPHILS ABSOLUTE: 0.2 K/UL (ref 0.05–0.5)
EOSINOPHILS RELATIVE PERCENT: 3 % (ref 0–6)
GFR SERPL CREATININE-BSD FRML MDRD: >60 ML/MIN/1.73M2
GLUCOSE BLD-MCNC: 110 MG/DL (ref 74–99)
GLUCOSE URINE: NEGATIVE MG/DL
HBA1C MFR BLD: 5.6 % (ref 4–5.6)
HCT VFR BLD CALC: 41.7 % (ref 34–48)
HDLC SERPL-MCNC: 46 MG/DL
HEMOGLOBIN: 13.4 G/DL (ref 11.5–15.5)
IMMATURE GRANULOCYTES: 1 % (ref 0–5)
KETONES, URINE: ABNORMAL MG/DL
LDL CHOLESTEROL: 105 MG/DL
LEUKOCYTE ESTERASE, URINE: NEGATIVE
LYMPHOCYTES ABSOLUTE: 1.47 K/UL (ref 1.5–4)
LYMPHOCYTES RELATIVE PERCENT: 22 % (ref 20–42)
MCH RBC QN AUTO: 30.1 PG (ref 26–35)
MCHC RBC AUTO-ENTMCNC: 32.1 G/DL (ref 32–34.5)
MCV RBC AUTO: 93.7 FL (ref 80–99.9)
MONOCYTES ABSOLUTE: 0.53 K/UL (ref 0.1–0.95)
MONOCYTES RELATIVE PERCENT: 8 % (ref 2–12)
NEUTROPHILS ABSOLUTE: 4.33 K/UL (ref 1.8–7.3)
NEUTROPHILS RELATIVE PERCENT: 65 % (ref 43–80)
NITRITE, URINE: NEGATIVE
PDW BLD-RTO: 13.2 % (ref 11.5–15)
PH UA: 5.5 (ref 5–9)
PLATELET # BLD: 272 K/UL (ref 130–450)
PMV BLD AUTO: 11.5 FL (ref 7–12)
POTASSIUM SERPL-SCNC: 4 MMOL/L (ref 3.5–5)
PROTEIN UA: NEGATIVE MG/DL
RBC # BLD: 4.45 M/UL (ref 3.5–5.5)
SODIUM BLD-SCNC: 144 MMOL/L (ref 132–146)
SPECIFIC GRAVITY UA: >1.03 (ref 1–1.03)
T4 TOTAL: 9.2 UG/DL (ref 4.5–11.7)
TOTAL PROTEIN: 7.4 G/DL (ref 6.4–8.3)
TRIGL SERPL-MCNC: 109 MG/DL
TSH SERPL DL<=0.05 MIU/L-ACNC: 2.31 UIU/ML (ref 0.27–4.2)
TURBIDITY: CLEAR
URINE HGB: NEGATIVE
UROBILINOGEN, URINE: 0.2 EU/DL (ref 0–1)
VITAMIN D 25-HYDROXY: 41.4 NG/ML (ref 30–100)
VLDLC SERPL CALC-MCNC: 22 MG/DL
WBC # BLD: 6.6 K/UL (ref 4.5–11.5)

## 2024-02-05 PROCEDURE — 3046F HEMOGLOBIN A1C LEVEL >9.0%: CPT | Performed by: FAMILY MEDICINE

## 2024-02-05 PROCEDURE — 93000 ELECTROCARDIOGRAM COMPLETE: CPT | Performed by: FAMILY MEDICINE

## 2024-02-05 PROCEDURE — 1123F ACP DISCUSS/DSCN MKR DOCD: CPT | Performed by: FAMILY MEDICINE

## 2024-02-05 PROCEDURE — 3075F SYST BP GE 130 - 139MM HG: CPT | Performed by: FAMILY MEDICINE

## 2024-02-05 PROCEDURE — 3079F DIAST BP 80-89 MM HG: CPT | Performed by: FAMILY MEDICINE

## 2024-02-05 PROCEDURE — G0439 PPPS, SUBSEQ VISIT: HCPCS | Performed by: FAMILY MEDICINE

## 2024-02-05 PROCEDURE — G8484 FLU IMMUNIZE NO ADMIN: HCPCS | Performed by: FAMILY MEDICINE

## 2024-02-05 PROCEDURE — 3017F COLORECTAL CA SCREEN DOC REV: CPT | Performed by: FAMILY MEDICINE

## 2024-02-05 SDOH — ECONOMIC STABILITY: FOOD INSECURITY: WITHIN THE PAST 12 MONTHS, THE FOOD YOU BOUGHT JUST DIDN'T LAST AND YOU DIDN'T HAVE MONEY TO GET MORE.: NEVER TRUE

## 2024-02-05 SDOH — ECONOMIC STABILITY: INCOME INSECURITY: HOW HARD IS IT FOR YOU TO PAY FOR THE VERY BASICS LIKE FOOD, HOUSING, MEDICAL CARE, AND HEATING?: NOT HARD AT ALL

## 2024-02-05 ASSESSMENT — PATIENT HEALTH QUESTIONNAIRE - PHQ9
1. LITTLE INTEREST OR PLEASURE IN DOING THINGS: 0
SUM OF ALL RESPONSES TO PHQ QUESTIONS 1-9: 0
2. FEELING DOWN, DEPRESSED OR HOPELESS: 0
SUM OF ALL RESPONSES TO PHQ QUESTIONS 1-9: 0
SUM OF ALL RESPONSES TO PHQ9 QUESTIONS 1 & 2: 0
SUM OF ALL RESPONSES TO PHQ QUESTIONS 1-9: 0
SUM OF ALL RESPONSES TO PHQ QUESTIONS 1-9: 0

## 2024-02-05 ASSESSMENT — LIFESTYLE VARIABLES: HOW MANY STANDARD DRINKS CONTAINING ALCOHOL DO YOU HAVE ON A TYPICAL DAY: PATIENT DOES NOT DRINK

## 2024-02-05 NOTE — PATIENT INSTRUCTIONS
Learning About Being Active as an Older Adult  Why is being active important as you get older?     Being active is one of the best things you can do for your health. And it's never too late to start. Being active--or getting active, if you aren't already--has definite benefits. It can:  Give you more energy,  Keep your mind sharp.  Improve balance to reduce your risk of falls.  Help you manage chronic illness with fewer medicines.  No matter how old you are, how fit you are, or what health problems you have, there is a form of activity that will work for you. And the more physical activity you can do, the better your overall health will be.  What kinds of activity can help you stay healthy?  Being more active will make your daily activities easier. Physical activity includes planned exercise and things you do in daily life. There are four types of activity:  Aerobic.  Doing aerobic activity makes your heart and lungs strong.  Includes walking, dancing, and gardening.  Aim for at least 2½ hours spread throughout the week.  It improves your energy and can help you sleep better.  Muscle-strengthening.  This type of activity can help maintain muscle and strengthen bones.  Includes climbing stairs, using resistance bands, and lifting or carrying heavy loads.  Aim for at least twice a week.  It can help protect the knees and other joints.  Stretching.  Stretching gives you better range of motion in joints and muscles.  Includes upper arm stretches, calf stretches, and gentle yoga.  Aim for at least twice a week, preferably after your muscles are warmed up from other activities.  It can help you function better in daily life.  Balancing.  This helps you stay coordinated and have good posture.  Includes heel-to-toe walking, cedric chi, and certain types of yoga.  Aim for at least 3 days a week.  It can reduce your risk of falling.  Even if you have a hard time meeting the recommendations, it's better to be more active

## 2024-02-05 NOTE — PROGRESS NOTES
advanced directive - a copy HAS NOT been provided.                                     Objective   Vitals:    02/05/24 0934   BP: 135/83   Pulse: 87   Resp: 17   Temp: 97.7 °F (36.5 °C)   TempSrc: Temporal   SpO2: 99%   Weight: 124.6 kg (274 lb 12.8 oz)   Height: 1.651 m (5' 5\")      Body mass index is 45.73 kg/m².      General Appearance: alert and oriented to person, place and time, well developed and well- nourished, in no acute distress  Skin: warm and dry, no rash or erythema  Head: normocephalic and atraumatic  Eyes: pupils equal, round, and reactive to light, extraocular eye movements intact, conjunctivae normal  ENT: tympanic membrane, external ear and ear canal normal bilaterally, nose without deformity, nasal mucosa and turbinates normal without polyps  Neck: supple and non-tender without mass, no thyromegaly or thyroid nodules, no cervical lymphadenopathy  Pulmonary/Chest: clear to auscultation bilaterally- no wheezes, rales or rhonchi, normal air movement, no respiratory distress  Cardiovascular: normal rate, regular rhythm, normal S1 and S2, no murmurs, rubs, clicks, or gallops, distal pulses intact, no carotid bruits  Abdomen: soft, non-tender, non-distended, normal bowel sounds, no masses or organomegaly  Extremities: no cyanosis, clubbing or edema  Musculoskeletal: marked dec rom kjnees  obese      No Known Allergies  Prior to Visit Medications    Medication Sig Taking? Authorizing Provider   methylPREDNISolone (MEDROL DOSEPACK) 4 MG tablet Take by mouth.  Manan Gallegos DO   albuterol sulfate HFA (VENTOLIN HFA) 108 (90 Base) MCG/ACT inhaler Inhale 2 puffs into the lungs 4 times daily as needed for Wheezing  Manan Gallegos DO   predniSONE (DELTASONE) 10 MG tablet ONE TID FOR THREE DAYS, ONE BID FOR THREE DAYS, ONE QD FOR THREE DAYS   FOOD  Manan Gallegos DO   buPROPion (WELLBUTRIN XL) 300 MG extended release tablet Take 1 tablet by mouth every morning  Manan Gallegos DO   losartan

## 2024-02-15 ENCOUNTER — OFFICE VISIT (OUTPATIENT)
Dept: CARDIOLOGY CLINIC | Age: 74
End: 2024-02-15

## 2024-02-15 VITALS
SYSTOLIC BLOOD PRESSURE: 152 MMHG | OXYGEN SATURATION: 94 % | WEIGHT: 274.6 LBS | DIASTOLIC BLOOD PRESSURE: 72 MMHG | BODY MASS INDEX: 45.75 KG/M2 | HEIGHT: 65 IN | HEART RATE: 90 BPM | RESPIRATION RATE: 18 BRPM

## 2024-02-15 DIAGNOSIS — R94.31 ABNORMAL EKG: Primary | ICD-10-CM

## 2024-02-15 NOTE — PROGRESS NOTES
OFFICE VISIT    NAME: Anisha Bustamante     YOB: 1950   AGE: 73 y.o.   MEDICAL RECORD NUMBER: [id number]       CONSULTATION INFORMATION:   DATE OF CLINIC CONSULTATION: 2/15/2024   PRINCIPLE DIAGNOSIS / reason for visit: Preop evaluation    CARE TEAM MEMBERS    PCP : Manan Gallegos DO     REFERRING PROVIDER: Manan Gallegos DO     HISTORY OF PRESENT ILLNESS:   Anisha Bustamante is a 73 y.o. female referred for preop evaluation due to abnormal EKG. Past medical history of HTN, HLD, basal cell carcinoma of right shoulder,        -TTE 8/24/2022: EF 65%, stage I diastolic function, trace MR, RVSP 26 mmHg, mild AS.  -ECG 2/15/2024: Normal sinus rhythm, nonspecific poor R wave progression, likely due to body habitus..  -Left heart cath 6/19/2019: Normal coronaries, peak to peak gradient across aortic valve was 17 mmHg.    Presents to clinic today alone.  She denies having any chest pain, shortness of breath or palpitations.  She is able to walk around the mall without symptoms.  She can take more than 2 flights of stairs at home every day without chest pain or shortness of breath.  She reports intermittent lower extremity edema over the last couple weeks that happen mostly when she sits up or stands up for prolonged period time.  She does acknowledge her swelling worsened after eating pickles.      PAST HISTORY:   Past Medical History:   Diagnosis Date    Anxiety     Basal cell carcinoma (BCC) of right shoulder     Chronic back pain     Closed avulsion fracture of distal fibula with delayed healing, left     COVID-19     Degenerative joint disease involving multiple joints     Diverticulitis     Hyperlipidemia     Hypertension     Essential hypertension    Liver disease     LLL pneumonia     Obesity     Osteoarthritis     Palpitations 9/17/2012    Sleep apnea     Tricuspid regurgitation     Trochanteric bursitis of right hip     Varicose veins of both lower extremities        Past Surgical

## 2024-02-16 ENCOUNTER — OFFICE VISIT (OUTPATIENT)
Dept: PRIMARY CARE CLINIC | Age: 74
End: 2024-02-16

## 2024-02-16 DIAGNOSIS — M19.011 PRIMARY OSTEOARTHRITIS OF RIGHT SHOULDER: ICD-10-CM

## 2024-02-16 DIAGNOSIS — Z01.818 PRE-OP EXAMINATION: ICD-10-CM

## 2024-02-16 DIAGNOSIS — I10 ESSENTIAL HYPERTENSION: Primary | ICD-10-CM

## 2024-02-16 DIAGNOSIS — E78.2 MIXED HYPERLIPIDEMIA: ICD-10-CM

## 2024-02-16 DIAGNOSIS — Z12.11 SCREEN FOR COLON CANCER: ICD-10-CM

## 2024-02-16 RX ORDER — HYDROCHLOROTHIAZIDE 25 MG/1
25 TABLET ORAL EVERY MORNING
Qty: 30 TABLET | Refills: 5 | Status: SHIPPED | OUTPATIENT
Start: 2024-02-16

## 2024-02-16 NOTE — PROGRESS NOTES
24  Name: Anisha Bustamante    : 1950    Sex: female    Age: 73 y.o.        Subjective:  Chief Complaint: Patient is here for ck  bp chol     bp cjhol  arth   anx  sagur  pre op   shoudler       She was seen by cardiology and cleared for surgery.  They advised add diuretic.  Feels well no cp ros ob         Review of Systems   Constitutional: Negative.    HENT: Negative.     Eyes: Negative.    Respiratory: Negative.     Cardiovascular: Negative.    Gastrointestinal: Negative.    Endocrine: Negative.    Genitourinary: Negative.    Musculoskeletal:  Positive for arthralgias.        Right shoulder pain   Skin: Negative.    Allergic/Immunologic: Negative.    Neurological: Negative.    Hematological: Negative.    Psychiatric/Behavioral: Negative.           Current Outpatient Medications:     hydroCHLOROthiazide (HYDRODIURIL) 25 MG tablet, Take 1 tablet by mouth every morning, Disp: 30 tablet, Rfl: 5    albuterol sulfate HFA (VENTOLIN HFA) 108 (90 Base) MCG/ACT inhaler, Inhale 2 puffs into the lungs 4 times daily as needed for Wheezing, Disp: 18 g, Rfl: 5    buPROPion (WELLBUTRIN XL) 300 MG extended release tablet, Take 1 tablet by mouth every morning, Disp: 90 tablet, Rfl: 3    losartan (COZAAR) 50 MG tablet, Take 1 tablet by mouth daily replaces norvasc, Disp: 90 tablet, Rfl: 5    simvastatin (ZOCOR) 40 MG tablet, Take 1 tablet by mouth nightly, Disp: 90 tablet, Rfl: 5    silver sulfADIAZINE (SILVADENE) 1 % cream, Apply topically qid, Disp: 60 g, Rfl: 5    Multiple Vitamins-Minerals (CENTRUM SILVER 50+WOMEN PO), Take by mouth, Disp: , Rfl:     vitamin D (CHOLECALCIFEROL) 5000 UNITS CAPS capsule, Take 1 capsule by mouth daily, Disp: , Rfl:     vitamin E 400 UNIT capsule, Take 1 capsule by mouth daily, Disp: , Rfl:     Coenzyme Q10 (CO Q 10 PO), Take 1 tablet by mouth daily.  , Disp: , Rfl:   No Known Allergies  Social History     Socioeconomic History    Marital status:      Spouse name: Not on

## 2024-02-17 VITALS
HEART RATE: 87 BPM | DIASTOLIC BLOOD PRESSURE: 78 MMHG | RESPIRATION RATE: 17 BRPM | BODY MASS INDEX: 45.76 KG/M2 | TEMPERATURE: 97.7 F | WEIGHT: 275 LBS | SYSTOLIC BLOOD PRESSURE: 138 MMHG | OXYGEN SATURATION: 97 %

## 2024-02-17 PROBLEM — M19.011 PRIMARY OSTEOARTHRITIS OF RIGHT SHOULDER: Status: ACTIVE | Noted: 2024-02-17

## 2024-02-19 ENCOUNTER — TELEPHONE (OUTPATIENT)
Dept: PRIMARY CARE CLINIC | Age: 74
End: 2024-02-19

## 2024-02-19 DIAGNOSIS — U07.1 COVID: Primary | ICD-10-CM

## 2024-02-19 RX ORDER — CEFDINIR 300 MG/1
300 CAPSULE ORAL 2 TIMES DAILY
Qty: 20 CAPSULE | Refills: 0 | Status: SHIPPED | OUTPATIENT
Start: 2024-02-19 | End: 2024-02-29

## 2024-02-19 NOTE — TELEPHONE ENCOUNTER
Notify pt    t wo meds sent   if  worse to er   or express         Meds prescribed.  Over-the-counter zinc and vitamin C.  Purchase an oximeter and call if oxygen saturation less than 90% on ambulation   .  If any temperature over 102 degree, shortness of breath,  chest pain go to the emergency room.  Complete isolation for 7 days from onset of symptoms .   Increase fluids.    Three meals a day.   If an loss of appetite, start protein shakes tid.    See me not better in 24 hours.  If worse to er

## 2024-02-19 NOTE — TELEPHONE ENCOUNTER
Covid +, c/o headache, runny nose, body aches, cough-nothing coming up, sore throat. A little sob SPO2  998%

## 2024-02-27 ENCOUNTER — TELEPHONE (OUTPATIENT)
Dept: PRIMARY CARE CLINIC | Age: 74
End: 2024-02-27

## 2024-02-27 DIAGNOSIS — Z01.818 PRE-OP EXAM: ICD-10-CM

## 2024-02-27 DIAGNOSIS — I10 ESSENTIAL HYPERTENSION: Primary | ICD-10-CM

## 2024-02-27 NOTE — TELEPHONE ENCOUNTER
Patient had to reschedule surgery for 4/9 due to getting Covid. Patient moved up her 2 month appointment for 3/13. Says the surgeon needs labs done again and a new form needs completed for preop. Asking if you can place lab orders and she will have them done on that Monday and see you on Wednesday for follow up

## 2024-03-11 DIAGNOSIS — I10 ESSENTIAL HYPERTENSION: ICD-10-CM

## 2024-03-11 DIAGNOSIS — Z01.818 PRE-OP EXAM: ICD-10-CM

## 2024-03-11 LAB
ABSOLUTE IMMATURE GRANULOCYTE: 0.05 K/UL (ref 0–0.58)
ALBUMIN SERPL-MCNC: 4.2 G/DL (ref 3.5–5.2)
ALP BLD-CCNC: 133 U/L (ref 35–104)
ALT SERPL-CCNC: 13 U/L (ref 0–32)
ANION GAP SERPL CALCULATED.3IONS-SCNC: 15 MMOL/L (ref 7–16)
AST SERPL-CCNC: 18 U/L (ref 0–31)
BASOPHILS ABSOLUTE: 0.02 K/UL (ref 0–0.2)
BASOPHILS RELATIVE PERCENT: 0 % (ref 0–2)
BILIRUB SERPL-MCNC: 0.9 MG/DL (ref 0–1.2)
BUN BLDV-MCNC: 13 MG/DL (ref 6–23)
CALCIUM SERPL-MCNC: 9.9 MG/DL (ref 8.6–10.2)
CHLORIDE BLD-SCNC: 100 MMOL/L (ref 98–107)
CO2: 23 MMOL/L (ref 22–29)
CREAT SERPL-MCNC: 0.9 MG/DL (ref 0.5–1)
EOSINOPHILS ABSOLUTE: 0.17 K/UL (ref 0.05–0.5)
EOSINOPHILS RELATIVE PERCENT: 2 % (ref 0–6)
GFR SERPL CREATININE-BSD FRML MDRD: >60 ML/MIN/1.73M2
GLUCOSE BLD-MCNC: 113 MG/DL (ref 74–99)
HCT VFR BLD CALC: 42 % (ref 34–48)
HEMOGLOBIN: 13.5 G/DL (ref 11.5–15.5)
IMMATURE GRANULOCYTES: 1 % (ref 0–5)
LYMPHOCYTES ABSOLUTE: 1.02 K/UL (ref 1.5–4)
LYMPHOCYTES RELATIVE PERCENT: 12 % (ref 20–42)
MCH RBC QN AUTO: 29.9 PG (ref 26–35)
MCHC RBC AUTO-ENTMCNC: 32.1 G/DL (ref 32–34.5)
MCV RBC AUTO: 92.9 FL (ref 80–99.9)
MONOCYTES ABSOLUTE: 0.48 K/UL (ref 0.1–0.95)
MONOCYTES RELATIVE PERCENT: 6 % (ref 2–12)
NEUTROPHILS ABSOLUTE: 6.56 K/UL (ref 1.8–7.3)
NEUTROPHILS RELATIVE PERCENT: 79 % (ref 43–80)
PDW BLD-RTO: 13.2 % (ref 11.5–15)
PLATELET # BLD: 262 K/UL (ref 130–450)
PMV BLD AUTO: 11.6 FL (ref 7–12)
POTASSIUM SERPL-SCNC: 4 MMOL/L (ref 3.5–5)
RBC # BLD: 4.52 M/UL (ref 3.5–5.5)
SODIUM BLD-SCNC: 138 MMOL/L (ref 132–146)
TOTAL PROTEIN: 7.1 G/DL (ref 6.4–8.3)
WBC # BLD: 8.3 K/UL (ref 4.5–11.5)

## 2024-03-13 ENCOUNTER — OFFICE VISIT (OUTPATIENT)
Dept: PRIMARY CARE CLINIC | Age: 74
End: 2024-03-13

## 2024-03-13 VITALS
DIASTOLIC BLOOD PRESSURE: 78 MMHG | BODY MASS INDEX: 45.43 KG/M2 | TEMPERATURE: 97.7 F | WEIGHT: 273 LBS | SYSTOLIC BLOOD PRESSURE: 138 MMHG | RESPIRATION RATE: 17 BRPM | HEART RATE: 89 BPM | OXYGEN SATURATION: 99 %

## 2024-03-13 DIAGNOSIS — Z01.818 PRE-OPERATIVE EXAMINATION: ICD-10-CM

## 2024-03-13 DIAGNOSIS — R60.0 BILATERAL LOWER EXTREMITY EDEMA: ICD-10-CM

## 2024-03-13 DIAGNOSIS — M19.011 PRIMARY OSTEOARTHRITIS OF RIGHT SHOULDER: ICD-10-CM

## 2024-03-13 DIAGNOSIS — E53.8 VITAMIN B 12 DEFICIENCY: ICD-10-CM

## 2024-03-13 DIAGNOSIS — I10 ESSENTIAL HYPERTENSION: Primary | ICD-10-CM

## 2024-03-13 DIAGNOSIS — R73.01 IMPAIRED FASTING GLUCOSE: ICD-10-CM

## 2024-03-13 DIAGNOSIS — M81.0 AGE-RELATED OSTEOPOROSIS WITHOUT CURRENT PATHOLOGICAL FRACTURE: ICD-10-CM

## 2024-03-13 SDOH — ECONOMIC STABILITY: FOOD INSECURITY: WITHIN THE PAST 12 MONTHS, THE FOOD YOU BOUGHT JUST DIDN'T LAST AND YOU DIDN'T HAVE MONEY TO GET MORE.: NEVER TRUE

## 2024-03-13 SDOH — ECONOMIC STABILITY: INCOME INSECURITY: HOW HARD IS IT FOR YOU TO PAY FOR THE VERY BASICS LIKE FOOD, HOUSING, MEDICAL CARE, AND HEATING?: NOT HARD AT ALL

## 2024-03-13 SDOH — ECONOMIC STABILITY: FOOD INSECURITY: WITHIN THE PAST 12 MONTHS, YOU WORRIED THAT YOUR FOOD WOULD RUN OUT BEFORE YOU GOT MONEY TO BUY MORE.: NEVER TRUE

## 2024-03-13 ASSESSMENT — ENCOUNTER SYMPTOMS
RESPIRATORY NEGATIVE: 1
GASTROINTESTINAL NEGATIVE: 1
EYES NEGATIVE: 1
ALLERGIC/IMMUNOLOGIC NEGATIVE: 1
SHORTNESS OF BREATH: 0

## 2024-03-13 ASSESSMENT — PATIENT HEALTH QUESTIONNAIRE - PHQ9
SUM OF ALL RESPONSES TO PHQ9 QUESTIONS 1 & 2: 0
SUM OF ALL RESPONSES TO PHQ QUESTIONS 1-9: 0
1. LITTLE INTEREST OR PLEASURE IN DOING THINGS: 0
SUM OF ALL RESPONSES TO PHQ QUESTIONS 1-9: 0
SUM OF ALL RESPONSES TO PHQ QUESTIONS 1-9: 0
2. FEELING DOWN, DEPRESSED OR HOPELESS: 0

## 2024-03-13 NOTE — PROGRESS NOTES
3/13/24  Name: Anisha Bustamante    : 1950    Sex: female    Age: 73 y.o.        Subjective:  Chief Complaint: Patient is here for   ck  re  edema  bp   chol arth   w t   anx sugar    pre opp   right shoudler         Feel ok no cp ros ob  o cough      or  cancleed due to covid   Lab doen  on hctz            Review of Systems   Constitutional: Negative.    HENT: Negative.     Eyes: Negative.    Respiratory: Negative.  Negative for shortness of breath.    Cardiovascular: Negative.  Negative for chest pain.   Gastrointestinal: Negative.    Endocrine: Negative.    Genitourinary: Negative.    Musculoskeletal:  Positive for arthralgias.   Skin: Negative.    Allergic/Immunologic: Negative.    Neurological: Negative.    Hematological: Negative.    Psychiatric/Behavioral: Negative.           Current Outpatient Medications:     nirmatrelvir/ritonavir (PAXLOVID) 10 x 150 MG & 10 x 100MG TBPK, Take 2 tablets (one 150 mg nirmatrelvir and one 100 mg ritonavir tablets) by mouth every 12 hours for 5 days. Hold zocor while on this med, Disp: 20 tablet, Rfl: 0    hydroCHLOROthiazide (HYDRODIURIL) 25 MG tablet, Take 1 tablet by mouth every morning, Disp: 30 tablet, Rfl: 5    albuterol sulfate HFA (VENTOLIN HFA) 108 (90 Base) MCG/ACT inhaler, Inhale 2 puffs into the lungs 4 times daily as needed for Wheezing, Disp: 18 g, Rfl: 5    buPROPion (WELLBUTRIN XL) 300 MG extended release tablet, Take 1 tablet by mouth every morning, Disp: 90 tablet, Rfl: 3    losartan (COZAAR) 50 MG tablet, Take 1 tablet by mouth daily replaces norvasc, Disp: 90 tablet, Rfl: 5    simvastatin (ZOCOR) 40 MG tablet, Take 1 tablet by mouth nightly, Disp: 90 tablet, Rfl: 5    silver sulfADIAZINE (SILVADENE) 1 % cream, Apply topically qid, Disp: 60 g, Rfl: 5    Multiple Vitamins-Minerals (CENTRUM SILVER 50+WOMEN PO), Take by mouth, Disp: , Rfl:     vitamin D (CHOLECALCIFEROL) 5000 UNITS CAPS capsule, Take 1 capsule by mouth daily, Disp: , Rfl:

## 2024-04-12 ENCOUNTER — OFFICE VISIT (OUTPATIENT)
Dept: PRIMARY CARE CLINIC | Age: 74
End: 2024-04-12

## 2024-04-12 VITALS
OXYGEN SATURATION: 99 % | TEMPERATURE: 97.6 F | WEIGHT: 270.7 LBS | HEART RATE: 83 BPM | RESPIRATION RATE: 17 BRPM | BODY MASS INDEX: 45.05 KG/M2

## 2024-04-12 DIAGNOSIS — R07.0 THROAT PAIN: Primary | ICD-10-CM

## 2024-04-12 DIAGNOSIS — R13.19 OTHER DYSPHAGIA: ICD-10-CM

## 2024-04-12 ASSESSMENT — ENCOUNTER SYMPTOMS
RESPIRATORY NEGATIVE: 1
GASTROINTESTINAL NEGATIVE: 1
ALLERGIC/IMMUNOLOGIC NEGATIVE: 1
EYES NEGATIVE: 1

## 2024-04-12 NOTE — PROGRESS NOTES
24  Name: Anisha Bustamante    : 1950    Sex: female    Age: 73 y.o.        Subjective:  Chief Complaint: Patient is here for  throat      and all     Set for thru     thre d ago and  cancleed due to unable to get tube dwon throat  Not has to be done at   [facility]   surger   but  sicne with  sl pain      no choking        Review of Systems   Constitutional: Negative.    HENT:          Hpi   Eyes: Negative.    Respiratory: Negative.     Cardiovascular: Negative.    Gastrointestinal: Negative.    Endocrine: Negative.    Genitourinary: Negative.    Skin: Negative.    Allergic/Immunologic: Negative.    Neurological: Negative.    Hematological: Negative.    Psychiatric/Behavioral: Negative.           Current Outpatient Medications:     nirmatrelvir/ritonavir (PAXLOVID) 10 x 150 MG & 10 x 100MG TBPK, Take 2 tablets (one 150 mg nirmatrelvir and one 100 mg ritonavir tablets) by mouth every 12 hours for 5 days. Hold zocor while on this med, Disp: 20 tablet, Rfl: 0    hydroCHLOROthiazide (HYDRODIURIL) 25 MG tablet, Take 1 tablet by mouth every morning, Disp: 30 tablet, Rfl: 5    albuterol sulfate HFA (VENTOLIN HFA) 108 (90 Base) MCG/ACT inhaler, Inhale 2 puffs into the lungs 4 times daily as needed for Wheezing, Disp: 18 g, Rfl: 5    buPROPion (WELLBUTRIN XL) 300 MG extended release tablet, Take 1 tablet by mouth every morning, Disp: 90 tablet, Rfl: 3    losartan (COZAAR) 50 MG tablet, Take 1 tablet by mouth daily replaces norvasc, Disp: 90 tablet, Rfl: 5    simvastatin (ZOCOR) 40 MG tablet, Take 1 tablet by mouth nightly, Disp: 90 tablet, Rfl: 5    silver sulfADIAZINE (SILVADENE) 1 % cream, Apply topically qid, Disp: 60 g, Rfl: 5    Multiple Vitamins-Minerals (CENTRUM SILVER 50+WOMEN PO), Take by mouth, Disp: , Rfl:     vitamin D (CHOLECALCIFEROL) 5000 UNITS CAPS capsule, Take 1 capsule by mouth daily, Disp: , Rfl:     vitamin E 400 UNIT capsule, Take 1 capsule by mouth daily, Disp: ,

## 2024-05-21 ENCOUNTER — OFFICE VISIT (OUTPATIENT)
Dept: ENT CLINIC | Age: 74
End: 2024-05-21
Payer: MEDICARE

## 2024-05-21 VITALS
OXYGEN SATURATION: 100 % | HEIGHT: 65 IN | WEIGHT: 275 LBS | BODY MASS INDEX: 45.82 KG/M2 | DIASTOLIC BLOOD PRESSURE: 66 MMHG | TEMPERATURE: 97.3 F | HEART RATE: 74 BPM | SYSTOLIC BLOOD PRESSURE: 138 MMHG

## 2024-05-21 DIAGNOSIS — T88.4XXA HARD TO INTUBATE, INITIAL ENCOUNTER: Primary | ICD-10-CM

## 2024-05-21 PROCEDURE — 1123F ACP DISCUSS/DSCN MKR DOCD: CPT | Performed by: OTOLARYNGOLOGY

## 2024-05-21 PROCEDURE — 1090F PRES/ABSN URINE INCON ASSESS: CPT | Performed by: OTOLARYNGOLOGY

## 2024-05-21 PROCEDURE — 99204 OFFICE O/P NEW MOD 45 MIN: CPT | Performed by: OTOLARYNGOLOGY

## 2024-05-21 PROCEDURE — 3075F SYST BP GE 130 - 139MM HG: CPT | Performed by: OTOLARYNGOLOGY

## 2024-05-21 PROCEDURE — G8417 CALC BMI ABV UP PARAM F/U: HCPCS | Performed by: OTOLARYNGOLOGY

## 2024-05-21 PROCEDURE — G8399 PT W/DXA RESULTS DOCUMENT: HCPCS | Performed by: OTOLARYNGOLOGY

## 2024-05-21 PROCEDURE — 31575 DIAGNOSTIC LARYNGOSCOPY: CPT | Performed by: OTOLARYNGOLOGY

## 2024-05-21 PROCEDURE — 3078F DIAST BP <80 MM HG: CPT | Performed by: OTOLARYNGOLOGY

## 2024-05-21 PROCEDURE — 1036F TOBACCO NON-USER: CPT | Performed by: OTOLARYNGOLOGY

## 2024-05-21 PROCEDURE — G8427 DOCREV CUR MEDS BY ELIG CLIN: HCPCS | Performed by: OTOLARYNGOLOGY

## 2024-05-21 PROCEDURE — 3017F COLORECTAL CA SCREEN DOC REV: CPT | Performed by: OTOLARYNGOLOGY

## 2024-05-21 NOTE — PROGRESS NOTES
Subjective:      Patient ID:  Anisha Bustamante is a 73 y.o. female.    HPI:    Pt presents for evaluation after aborted intubation for elective shoulder surgery. Pt reports she was told anesthesia could not intubate due to \"adhesions\" She denies any symptoms of difficulty swallowing or breathing issues. Denies history of head or neck surgeries. Non smoker.     Patient's medications, allergies, past medical, surgical, social and family histories were reviewed and updated as appropriate.        Review of Systems   Constitutional:  Negative for activity change, fatigue and fever.   HENT:  Negative for congestion, ear discharge, ear pain, hearing loss, nosebleeds, postnasal drip, rhinorrhea, sinus pressure, sinus pain, sore throat, tinnitus, trouble swallowing and voice change.    Respiratory:  Negative for cough, choking, wheezing and stridor.    Cardiovascular:  Negative for chest pain.   Gastrointestinal: Negative.    Genitourinary: Negative.    Skin:  Negative for color change and rash.   Neurological:  Negative for speech difficulty, light-headedness, numbness and headaches.   Hematological:  Negative for adenopathy.   Psychiatric/Behavioral:  Negative for behavioral problems.              Objective:   Physical Exam  Constitutional:       Appearance: Normal appearance. She is normal weight.   HENT:      Head: Normocephalic and atraumatic.      Right Ear: Tympanic membrane, ear canal and external ear normal. No drainage.      Left Ear: Tympanic membrane, ear canal and external ear normal. No drainage.      Nose: Nose normal. No nasal deformity or septal deviation.      Mouth/Throat:      Mouth: Mucous membranes are moist.      Comments: Mallampati class 4  Eyes:      General: Lids are normal. Vision grossly intact.      Extraocular Movements: Extraocular movements intact.      Conjunctiva/sclera: Conjunctivae normal.      Pupils: Pupils are equal, round, and reactive to light.   Cardiovascular:      Rate and

## 2024-06-12 ENCOUNTER — OFFICE VISIT (OUTPATIENT)
Dept: PRIMARY CARE CLINIC | Age: 74
End: 2024-06-12
Payer: MEDICARE

## 2024-06-12 VITALS
SYSTOLIC BLOOD PRESSURE: 138 MMHG | HEART RATE: 100 BPM | OXYGEN SATURATION: 97 % | WEIGHT: 276 LBS | DIASTOLIC BLOOD PRESSURE: 83 MMHG | TEMPERATURE: 97.8 F | RESPIRATION RATE: 17 BRPM | BODY MASS INDEX: 45.93 KG/M2

## 2024-06-12 DIAGNOSIS — E53.8 VITAMIN B 12 DEFICIENCY: ICD-10-CM

## 2024-06-12 DIAGNOSIS — E11.9 DIET-CONTROLLED DIABETES MELLITUS (HCC): ICD-10-CM

## 2024-06-12 DIAGNOSIS — I10 ESSENTIAL HYPERTENSION: Primary | Chronic | ICD-10-CM

## 2024-06-12 DIAGNOSIS — R60.0 BILATERAL LOWER EXTREMITY EDEMA: ICD-10-CM

## 2024-06-12 DIAGNOSIS — I10 ESSENTIAL HYPERTENSION: ICD-10-CM

## 2024-06-12 DIAGNOSIS — M81.0 AGE-RELATED OSTEOPOROSIS WITHOUT CURRENT PATHOLOGICAL FRACTURE: ICD-10-CM

## 2024-06-12 DIAGNOSIS — M17.12 PRIMARY OSTEOARTHRITIS OF LEFT KNEE: Chronic | ICD-10-CM

## 2024-06-12 DIAGNOSIS — M19.011 PRIMARY OSTEOARTHRITIS OF RIGHT SHOULDER: ICD-10-CM

## 2024-06-12 DIAGNOSIS — R73.01 IMPAIRED FASTING GLUCOSE: ICD-10-CM

## 2024-06-12 LAB
ALBUMIN: 4.5 G/DL (ref 3.5–5.2)
ALP BLD-CCNC: 137 U/L (ref 35–104)
ALT SERPL-CCNC: 13 U/L (ref 0–32)
ANION GAP SERPL CALCULATED.3IONS-SCNC: 16 MMOL/L (ref 7–16)
AST SERPL-CCNC: 20 U/L (ref 0–31)
BASOPHILS ABSOLUTE: 0.03 K/UL (ref 0–0.2)
BASOPHILS RELATIVE PERCENT: 1 % (ref 0–2)
BILIRUB SERPL-MCNC: 0.8 MG/DL (ref 0–1.2)
BUN BLDV-MCNC: 18 MG/DL (ref 6–23)
CALCIUM SERPL-MCNC: 10.3 MG/DL (ref 8.6–10.2)
CHLORIDE BLD-SCNC: 104 MMOL/L (ref 98–107)
CHOLESTEROL, TOTAL: 193 MG/DL
CO2: 22 MMOL/L (ref 22–29)
CREAT SERPL-MCNC: 0.9 MG/DL (ref 0.5–1)
CREATININE URINE: 229.3 MG/DL (ref 29–226)
EOSINOPHILS ABSOLUTE: 0.2 K/UL (ref 0.05–0.5)
EOSINOPHILS RELATIVE PERCENT: 3 % (ref 0–6)
GFR, ESTIMATED: 67 ML/MIN/1.73M2
GLUCOSE BLD-MCNC: 115 MG/DL (ref 74–99)
HBA1C MFR BLD: 5.9 % (ref 4–5.6)
HCT VFR BLD CALC: 42.8 % (ref 34–48)
HDLC SERPL-MCNC: 38 MG/DL
HEMOGLOBIN: 13.5 G/DL (ref 11.5–15.5)
IMMATURE GRANULOCYTES %: 1 % (ref 0–5)
IMMATURE GRANULOCYTES ABSOLUTE: 0.04 K/UL (ref 0–0.58)
LDL CHOLESTEROL: 122 MG/DL
LYMPHOCYTES ABSOLUTE: 1.1 K/UL (ref 1.5–4)
LYMPHOCYTES RELATIVE PERCENT: 18 % (ref 20–42)
MCH RBC QN AUTO: 28.8 PG (ref 26–35)
MCHC RBC AUTO-ENTMCNC: 31.5 G/DL (ref 32–34.5)
MCV RBC AUTO: 91.3 FL (ref 80–99.9)
MICROALBUMIN/CREAT 24H UR: 23 MG/L (ref 0–19)
MICROALBUMIN/CREAT UR-RTO: 10 MCG/MG CREAT (ref 0–30)
MONOCYTES ABSOLUTE: 0.44 K/UL (ref 0.1–0.95)
MONOCYTES RELATIVE PERCENT: 7 % (ref 2–12)
NEUTROPHILS ABSOLUTE: 4.35 K/UL (ref 1.8–7.3)
NEUTROPHILS RELATIVE PERCENT: 71 % (ref 43–80)
PDW BLD-RTO: 14 % (ref 11.5–15)
PLATELET # BLD: 260 K/UL (ref 130–450)
PMV BLD AUTO: 11.8 FL (ref 7–12)
POTASSIUM SERPL-SCNC: 4.8 MMOL/L (ref 3.5–5)
RBC # BLD: 4.69 M/UL (ref 3.5–5.5)
SODIUM BLD-SCNC: 142 MMOL/L (ref 132–146)
TOTAL PROTEIN: 7.5 G/DL (ref 6.4–8.3)
TRIGL SERPL-MCNC: 164 MG/DL
VITAMIN B-12: 656 PG/ML (ref 211–946)
VITAMIN D 25-HYDROXY: 35.6 NG/ML (ref 30–100)
VLDLC SERPL CALC-MCNC: 33 MG/DL
WBC # BLD: 6.2 K/UL (ref 4.5–11.5)

## 2024-06-12 PROCEDURE — 3044F HG A1C LEVEL LT 7.0%: CPT | Performed by: FAMILY MEDICINE

## 2024-06-12 PROCEDURE — 1090F PRES/ABSN URINE INCON ASSESS: CPT | Performed by: FAMILY MEDICINE

## 2024-06-12 PROCEDURE — G8428 CUR MEDS NOT DOCUMENT: HCPCS | Performed by: FAMILY MEDICINE

## 2024-06-12 PROCEDURE — 3075F SYST BP GE 130 - 139MM HG: CPT | Performed by: FAMILY MEDICINE

## 2024-06-12 PROCEDURE — G8417 CALC BMI ABV UP PARAM F/U: HCPCS | Performed by: FAMILY MEDICINE

## 2024-06-12 PROCEDURE — 1036F TOBACCO NON-USER: CPT | Performed by: FAMILY MEDICINE

## 2024-06-12 PROCEDURE — G8399 PT W/DXA RESULTS DOCUMENT: HCPCS | Performed by: FAMILY MEDICINE

## 2024-06-12 PROCEDURE — 99214 OFFICE O/P EST MOD 30 MIN: CPT | Performed by: FAMILY MEDICINE

## 2024-06-12 PROCEDURE — 3017F COLORECTAL CA SCREEN DOC REV: CPT | Performed by: FAMILY MEDICINE

## 2024-06-12 PROCEDURE — 1123F ACP DISCUSS/DSCN MKR DOCD: CPT | Performed by: FAMILY MEDICINE

## 2024-06-12 PROCEDURE — 2022F DILAT RTA XM EVC RTNOPTHY: CPT | Performed by: FAMILY MEDICINE

## 2024-06-12 PROCEDURE — 3079F DIAST BP 80-89 MM HG: CPT | Performed by: FAMILY MEDICINE

## 2024-06-12 SDOH — ECONOMIC STABILITY: FOOD INSECURITY: WITHIN THE PAST 12 MONTHS, YOU WORRIED THAT YOUR FOOD WOULD RUN OUT BEFORE YOU GOT MONEY TO BUY MORE.: NEVER TRUE

## 2024-06-12 SDOH — ECONOMIC STABILITY: FOOD INSECURITY: WITHIN THE PAST 12 MONTHS, THE FOOD YOU BOUGHT JUST DIDN'T LAST AND YOU DIDN'T HAVE MONEY TO GET MORE.: NEVER TRUE

## 2024-06-12 SDOH — ECONOMIC STABILITY: INCOME INSECURITY: HOW HARD IS IT FOR YOU TO PAY FOR THE VERY BASICS LIKE FOOD, HOUSING, MEDICAL CARE, AND HEATING?: NOT HARD AT ALL

## 2024-06-12 ASSESSMENT — PATIENT HEALTH QUESTIONNAIRE - PHQ9
SUM OF ALL RESPONSES TO PHQ QUESTIONS 1-9: 0
SUM OF ALL RESPONSES TO PHQ9 QUESTIONS 1 & 2: 0
SUM OF ALL RESPONSES TO PHQ QUESTIONS 1-9: 0
2. FEELING DOWN, DEPRESSED OR HOPELESS: NOT AT ALL
SUM OF ALL RESPONSES TO PHQ QUESTIONS 1-9: 0
SUM OF ALL RESPONSES TO PHQ QUESTIONS 1-9: 0
1. LITTLE INTEREST OR PLEASURE IN DOING THINGS: NOT AT ALL

## 2024-06-12 NOTE — PROGRESS NOTES
24  Name: Anisha Bustamante    : 1950    Sex: female    Age: 73 y.o.        Subjective:  Chief Complaint: Patient is here for   3 mo ck  re   Bp  chol  arht anx  roseanne frank    Feels ok  no  cp or sob  s aw ent and thorat cleared   she post pone surgeyr a but sees stefko next week  Was to get lab doen but  [facility]          Review of Systems   Constitutional: Negative.    HENT: Negative.     Eyes: Negative.    Respiratory: Negative.     Cardiovascular: Negative.    Gastrointestinal: Negative.    Endocrine: Negative.    Genitourinary: Negative.    Musculoskeletal:  Positive for arthralgias.   Skin: Negative.    Allergic/Immunologic: Negative.    Neurological: Negative.    Hematological: Negative.    Psychiatric/Behavioral: Negative.           Current Outpatient Medications:     hydroCHLOROthiazide (HYDRODIURIL) 25 MG tablet, Take 1 tablet by mouth every morning, Disp: 30 tablet, Rfl: 5    buPROPion (WELLBUTRIN XL) 300 MG extended release tablet, Take 1 tablet by mouth every morning, Disp: 90 tablet, Rfl: 3    losartan (COZAAR) 50 MG tablet, Take 1 tablet by mouth daily replaces norvasc, Disp: 90 tablet, Rfl: 5    simvastatin (ZOCOR) 40 MG tablet, Take 1 tablet by mouth nightly, Disp: 90 tablet, Rfl: 5    Multiple Vitamins-Minerals (CENTRUM SILVER 50+WOMEN PO), Take by mouth, Disp: , Rfl:     vitamin D (CHOLECALCIFEROL) 5000 UNITS CAPS capsule, Take 1 capsule by mouth daily, Disp: , Rfl:     vitamin E 400 UNIT capsule, Take 1 capsule by mouth daily, Disp: , Rfl:     Coenzyme Q10 (CO Q 10 PO), Take 1 tablet by mouth daily.  , Disp: , Rfl:   No Known Allergies  Social History     Socioeconomic History    Marital status:      Spouse name: Not on file    Number of children: Not on file    Years of education: Not on file    Highest education level: Not on file   Occupational History    Not on file   Tobacco Use    Smoking status: Former     Types: Cigarettes    Smokeless tobacco: Never   Vaping

## 2024-06-13 NOTE — RESULT ENCOUNTER NOTE
Notify patient lab okay but hemoglobin A1c which is her average sugar over 90 days up in the prediabetic range.  Low sugar low-carb diet.  Regular appointment

## 2024-08-05 ENCOUNTER — OFFICE VISIT (OUTPATIENT)
Dept: FAMILY MEDICINE CLINIC | Age: 74
End: 2024-08-05
Payer: MEDICARE

## 2024-08-05 VITALS
SYSTOLIC BLOOD PRESSURE: 130 MMHG | BODY MASS INDEX: 44.65 KG/M2 | HEART RATE: 86 BPM | DIASTOLIC BLOOD PRESSURE: 74 MMHG | TEMPERATURE: 97.8 F | OXYGEN SATURATION: 95 % | HEIGHT: 65 IN | WEIGHT: 268 LBS

## 2024-08-05 DIAGNOSIS — B02.9 HERPES ZOSTER WITHOUT COMPLICATION: Primary | ICD-10-CM

## 2024-08-05 PROCEDURE — 3075F SYST BP GE 130 - 139MM HG: CPT | Performed by: PHYSICIAN ASSISTANT

## 2024-08-05 PROCEDURE — 1036F TOBACCO NON-USER: CPT | Performed by: PHYSICIAN ASSISTANT

## 2024-08-05 PROCEDURE — 1090F PRES/ABSN URINE INCON ASSESS: CPT | Performed by: PHYSICIAN ASSISTANT

## 2024-08-05 PROCEDURE — 3017F COLORECTAL CA SCREEN DOC REV: CPT | Performed by: PHYSICIAN ASSISTANT

## 2024-08-05 PROCEDURE — 1123F ACP DISCUSS/DSCN MKR DOCD: CPT | Performed by: PHYSICIAN ASSISTANT

## 2024-08-05 PROCEDURE — G8417 CALC BMI ABV UP PARAM F/U: HCPCS | Performed by: PHYSICIAN ASSISTANT

## 2024-08-05 PROCEDURE — G8399 PT W/DXA RESULTS DOCUMENT: HCPCS | Performed by: PHYSICIAN ASSISTANT

## 2024-08-05 PROCEDURE — G8427 DOCREV CUR MEDS BY ELIG CLIN: HCPCS | Performed by: PHYSICIAN ASSISTANT

## 2024-08-05 PROCEDURE — 99214 OFFICE O/P EST MOD 30 MIN: CPT | Performed by: PHYSICIAN ASSISTANT

## 2024-08-05 PROCEDURE — 3078F DIAST BP <80 MM HG: CPT | Performed by: PHYSICIAN ASSISTANT

## 2024-08-05 RX ORDER — HYDROCODONE BITARTRATE AND ACETAMINOPHEN 5; 325 MG/1; MG/1
1 TABLET ORAL EVERY 8 HOURS PRN
Qty: 9 TABLET | Refills: 0 | Status: SHIPPED | OUTPATIENT
Start: 2024-08-05 | End: 2024-08-08

## 2024-08-05 RX ORDER — VALACYCLOVIR HYDROCHLORIDE 1 G/1
1000 TABLET, FILM COATED ORAL 3 TIMES DAILY
Qty: 21 TABLET | Refills: 0 | Status: SHIPPED | OUTPATIENT
Start: 2024-08-05 | End: 2024-08-12

## 2024-08-05 NOTE — PROGRESS NOTES
will be given a very short course of pain medication to help with the discomfort.  Patient is to follow-up with PCP.  Call with any questions    The patient is to return to express care or go directly to the emergency department should any of the signs or symptoms worsen. The patient is to followup with primary care physician in 2-3 days for repeat evaluation. The patient has no other questions or concerns at this time the patient will be discharged home.      Clinical Impression:   Anisha was seen today for rash.    Diagnoses and all orders for this visit:    Herpes zoster without complication  -     HYDROcodone-acetaminophen (NORCO) 5-325 MG per tablet; Take 1 tablet by mouth every 8 hours as needed for Pain for up to 3 days. Intended supply: 3 days. Take lowest dose possible to manage pain Max Daily Amount: 3 tablets    Other orders  -     valACYclovir (VALTREX) 1 g tablet; Take 1 tablet by mouth 3 times daily for 7 days        The patient is to call for any concerns or return if any of the signs or symptoms worsen. The patient is to follow-up with PCP in the next 2-3 days for repeat evaluation repeat assessment or go directly to the emergency department.     SIGNATURE: Naveed Singh III, PA-C    This document may have been prepared at least partially through the use of voice recognition software. Although effort is taken to assure the accuracy ofthis document, it is possible that grammatical, syntax, or spelling errors may occur.

## 2024-08-12 ENCOUNTER — OFFICE VISIT (OUTPATIENT)
Dept: PRIMARY CARE CLINIC | Age: 74
End: 2024-08-12
Payer: MEDICARE

## 2024-08-12 VITALS
RESPIRATION RATE: 17 BRPM | OXYGEN SATURATION: 98 % | TEMPERATURE: 97.7 F | BODY MASS INDEX: 44.56 KG/M2 | DIASTOLIC BLOOD PRESSURE: 83 MMHG | SYSTOLIC BLOOD PRESSURE: 135 MMHG | WEIGHT: 267.8 LBS | HEART RATE: 92 BPM

## 2024-08-12 DIAGNOSIS — B02.9 HERPES ZOSTER WITHOUT COMPLICATION: Primary | ICD-10-CM

## 2024-08-12 DIAGNOSIS — B02.29 POST HERPETIC NEURALGIA: ICD-10-CM

## 2024-08-12 PROCEDURE — 1090F PRES/ABSN URINE INCON ASSESS: CPT | Performed by: FAMILY MEDICINE

## 2024-08-12 PROCEDURE — 3017F COLORECTAL CA SCREEN DOC REV: CPT | Performed by: FAMILY MEDICINE

## 2024-08-12 PROCEDURE — G8417 CALC BMI ABV UP PARAM F/U: HCPCS | Performed by: FAMILY MEDICINE

## 2024-08-12 PROCEDURE — 1123F ACP DISCUSS/DSCN MKR DOCD: CPT | Performed by: FAMILY MEDICINE

## 2024-08-12 PROCEDURE — 99213 OFFICE O/P EST LOW 20 MIN: CPT | Performed by: FAMILY MEDICINE

## 2024-08-12 PROCEDURE — G8399 PT W/DXA RESULTS DOCUMENT: HCPCS | Performed by: FAMILY MEDICINE

## 2024-08-12 PROCEDURE — 3079F DIAST BP 80-89 MM HG: CPT | Performed by: FAMILY MEDICINE

## 2024-08-12 PROCEDURE — 1036F TOBACCO NON-USER: CPT | Performed by: FAMILY MEDICINE

## 2024-08-12 PROCEDURE — G8428 CUR MEDS NOT DOCUMENT: HCPCS | Performed by: FAMILY MEDICINE

## 2024-08-12 PROCEDURE — 3075F SYST BP GE 130 - 139MM HG: CPT | Performed by: FAMILY MEDICINE

## 2024-08-12 RX ORDER — PREDNISONE 10 MG/1
TABLET ORAL
Qty: 18 TABLET | Refills: 0 | Status: SHIPPED | OUTPATIENT
Start: 2024-08-12

## 2024-08-12 RX ORDER — BACLOFEN 10 MG/1
10 TABLET ORAL 3 TIMES DAILY PRN
Qty: 30 TABLET | Refills: 2 | Status: SHIPPED | OUTPATIENT
Start: 2024-08-12

## 2024-08-12 RX ORDER — GABAPENTIN 300 MG/1
CAPSULE ORAL
Qty: 60 CAPSULE | Refills: 2 | Status: SHIPPED | OUTPATIENT
Start: 2024-08-12 | End: 2024-10-22

## 2024-08-12 SDOH — ECONOMIC STABILITY: FOOD INSECURITY: WITHIN THE PAST 12 MONTHS, THE FOOD YOU BOUGHT JUST DIDN'T LAST AND YOU DIDN'T HAVE MONEY TO GET MORE.: NEVER TRUE

## 2024-08-12 SDOH — ECONOMIC STABILITY: FOOD INSECURITY: WITHIN THE PAST 12 MONTHS, YOU WORRIED THAT YOUR FOOD WOULD RUN OUT BEFORE YOU GOT MONEY TO BUY MORE.: NEVER TRUE

## 2024-08-12 SDOH — ECONOMIC STABILITY: INCOME INSECURITY: HOW HARD IS IT FOR YOU TO PAY FOR THE VERY BASICS LIKE FOOD, HOUSING, MEDICAL CARE, AND HEATING?: NOT HARD AT ALL

## 2024-08-12 ASSESSMENT — ENCOUNTER SYMPTOMS
EYES NEGATIVE: 1
ALLERGIC/IMMUNOLOGIC NEGATIVE: 1
RESPIRATORY NEGATIVE: 1
GASTROINTESTINAL NEGATIVE: 1

## 2024-08-12 ASSESSMENT — PATIENT HEALTH QUESTIONNAIRE - PHQ9
2. FEELING DOWN, DEPRESSED OR HOPELESS: NOT AT ALL
SUM OF ALL RESPONSES TO PHQ9 QUESTIONS 1 & 2: 0
1. LITTLE INTEREST OR PLEASURE IN DOING THINGS: NOT AT ALL
SUM OF ALL RESPONSES TO PHQ QUESTIONS 1-9: 0

## 2024-08-12 NOTE — PROGRESS NOTES
my office immediately to avoid risk of worsening medical condition      Follow Up: No follow-ups on file.     Seen by:  Manan Gallegos DO

## 2024-08-15 DIAGNOSIS — F41.9 ANXIETY: ICD-10-CM

## 2024-08-15 RX ORDER — BUPROPION HYDROCHLORIDE 300 MG/1
300 TABLET ORAL EVERY MORNING
Qty: 90 TABLET | Refills: 3 | Status: SHIPPED | OUTPATIENT
Start: 2024-08-15

## 2024-08-15 NOTE — TELEPHONE ENCOUNTER
Patients last appointment 8/12/2024.  Patients next scheduled appointment   Future Appointments   Date Time Provider Department Center   12/13/2024 10:00 AM Manan Gallegos DO [facility] DEP

## 2024-08-19 DIAGNOSIS — I10 ESSENTIAL HYPERTENSION: Chronic | ICD-10-CM

## 2024-08-19 RX ORDER — LOSARTAN POTASSIUM 50 MG/1
50 TABLET ORAL DAILY
Qty: 90 TABLET | Refills: 3 | Status: SHIPPED | OUTPATIENT
Start: 2024-08-19

## 2024-08-21 ENCOUNTER — OFFICE VISIT (OUTPATIENT)
Dept: PRIMARY CARE CLINIC | Age: 74
End: 2024-08-21

## 2024-08-21 VITALS — OXYGEN SATURATION: 96 % | BODY MASS INDEX: 44.26 KG/M2 | HEART RATE: 100 BPM | WEIGHT: 266 LBS | TEMPERATURE: 98 F

## 2024-08-21 DIAGNOSIS — L30.9 DERMATITIS: ICD-10-CM

## 2024-08-21 DIAGNOSIS — B02.9 HERPES ZOSTER WITHOUT COMPLICATION: Primary | ICD-10-CM

## 2024-08-21 DIAGNOSIS — L03.313 CELLULITIS OF CHEST WALL: ICD-10-CM

## 2024-08-21 RX ORDER — CEPHALEXIN 500 MG/1
500 CAPSULE ORAL 3 TIMES DAILY
Qty: 21 CAPSULE | Refills: 0 | Status: SHIPPED | OUTPATIENT
Start: 2024-08-21

## 2024-08-21 RX ORDER — NYSTATIN 100000 U/G
CREAM TOPICAL
Qty: 120 G | Refills: 5 | Status: SHIPPED | OUTPATIENT
Start: 2024-08-21

## 2024-08-21 RX ORDER — LIDOCAINE 50 MG/G
OINTMENT TOPICAL
Qty: 120 G | Refills: 5 | Status: SHIPPED | OUTPATIENT
Start: 2024-08-21

## 2024-08-21 RX ORDER — CEFTRIAXONE 500 MG/1
500 INJECTION, POWDER, FOR SOLUTION INTRAMUSCULAR; INTRAVENOUS ONCE
Status: COMPLETED | OUTPATIENT
Start: 2024-08-21 | End: 2024-08-21

## 2024-08-21 RX ADMIN — CEFTRIAXONE 500 MG: 500 INJECTION, POWDER, FOR SOLUTION INTRAMUSCULAR; INTRAVENOUS at 11:28

## 2024-08-21 NOTE — PROGRESS NOTES
24  Name: Anisha Bustamante    : 1950    Sex: female    Age: 73 y.o.        Subjective:  Chief Complaint: Patient is here for right ax   and  chst wall [facility]     She took all the  valtrex     Nwo ith   charan  under lright breats withmostirue  Itch and burn        Review of Systems   Constitutional: Negative.    HENT: Negative.     Eyes: Negative.    Respiratory: Negative.     Cardiovascular: Negative.    Gastrointestinal: Negative.    Endocrine: Negative.    Genitourinary: Negative.    Musculoskeletal: Negative.    Skin:  Positive for rash.   Allergic/Immunologic: Negative.    Neurological: Negative.    Hematological: Negative.    Psychiatric/Behavioral: Negative.           Current Outpatient Medications:     cephALEXin (KEFLEX) 500 MG capsule, Take 1 capsule by mouth 3 times daily, Disp: 21 capsule, Rfl: 0    mupirocin (BACTROBAN) 2 % ointment, Apply topically 4 times daily., Disp: 120 g, Rfl: 5    lidocaine (XYLOCAINE) 5 % ointment, Apply topically every 4 hrs as needed, Disp: 120 g, Rfl: 5    nystatin (MYCOSTATIN) 018132 UNIT/GM cream, Apply topically 4 times daily., Disp: 120 g, Rfl: 5    losartan (COZAAR) 50 MG tablet, Take 1 tablet by mouth daily replaces norvasc, Disp: 90 tablet, Rfl: 3    buPROPion (WELLBUTRIN XL) 300 MG extended release tablet, Take 1 tablet by mouth every morning, Disp: 90 tablet, Rfl: 3    gabapentin (NEURONTIN) 300 MG capsule, One q hs for three days then one bid thereafter--supper and bedtime, Disp: 60 capsule, Rfl: 2    predniSONE (DELTASONE) 10 MG tablet, ONE TID FOR THREE DAYS, ONE BID FOR THREE DAYS, ONE QD FOR THREE DAYS   FOOD, Disp: 18 tablet, Rfl: 0    baclofen (LIORESAL) 10 MG tablet, Take 1 tablet by mouth 3 times daily as needed (spasm) Tired  Do not drive, Disp: 30 tablet, Rfl: 2    hydroCHLOROthiazide (HYDRODIURIL) 25 MG tablet, Take 1 tablet by mouth every morning, Disp: 30 tablet, Rfl: 5    simvastatin (ZOCOR) 40 MG tablet, Take 1 tablet by mouth nightly,

## 2024-09-16 ENCOUNTER — OFFICE VISIT (OUTPATIENT)
Dept: PRIMARY CARE CLINIC | Age: 74
End: 2024-09-16
Payer: MEDICARE

## 2024-09-16 VITALS
SYSTOLIC BLOOD PRESSURE: 142 MMHG | HEART RATE: 84 BPM | OXYGEN SATURATION: 99 % | TEMPERATURE: 97.8 F | WEIGHT: 266.2 LBS | RESPIRATION RATE: 17 BRPM | BODY MASS INDEX: 44.3 KG/M2 | DIASTOLIC BLOOD PRESSURE: 84 MMHG

## 2024-09-16 DIAGNOSIS — B02.29 POST HERPETIC NEURALGIA: Primary | ICD-10-CM

## 2024-09-16 DIAGNOSIS — I10 ESSENTIAL HYPERTENSION: Chronic | ICD-10-CM

## 2024-09-16 PROCEDURE — G8399 PT W/DXA RESULTS DOCUMENT: HCPCS | Performed by: FAMILY MEDICINE

## 2024-09-16 PROCEDURE — 1090F PRES/ABSN URINE INCON ASSESS: CPT | Performed by: FAMILY MEDICINE

## 2024-09-16 PROCEDURE — 3079F DIAST BP 80-89 MM HG: CPT | Performed by: FAMILY MEDICINE

## 2024-09-16 PROCEDURE — G8417 CALC BMI ABV UP PARAM F/U: HCPCS | Performed by: FAMILY MEDICINE

## 2024-09-16 PROCEDURE — 1123F ACP DISCUSS/DSCN MKR DOCD: CPT | Performed by: FAMILY MEDICINE

## 2024-09-16 PROCEDURE — 99213 OFFICE O/P EST LOW 20 MIN: CPT | Performed by: FAMILY MEDICINE

## 2024-09-16 PROCEDURE — 3017F COLORECTAL CA SCREEN DOC REV: CPT | Performed by: FAMILY MEDICINE

## 2024-09-16 PROCEDURE — G8428 CUR MEDS NOT DOCUMENT: HCPCS | Performed by: FAMILY MEDICINE

## 2024-09-16 PROCEDURE — 3077F SYST BP >= 140 MM HG: CPT | Performed by: FAMILY MEDICINE

## 2024-09-16 PROCEDURE — 1036F TOBACCO NON-USER: CPT | Performed by: FAMILY MEDICINE

## 2024-09-16 RX ORDER — GABAPENTIN 300 MG/1
CAPSULE ORAL
Qty: 120 CAPSULE | Refills: 4 | Status: SHIPPED | OUTPATIENT
Start: 2024-09-16 | End: 2024-11-26

## 2024-09-16 SDOH — ECONOMIC STABILITY: FOOD INSECURITY: WITHIN THE PAST 12 MONTHS, THE FOOD YOU BOUGHT JUST DIDN'T LAST AND YOU DIDN'T HAVE MONEY TO GET MORE.: NEVER TRUE

## 2024-09-16 SDOH — ECONOMIC STABILITY: INCOME INSECURITY: HOW HARD IS IT FOR YOU TO PAY FOR THE VERY BASICS LIKE FOOD, HOUSING, MEDICAL CARE, AND HEATING?: NOT HARD AT ALL

## 2024-09-16 SDOH — ECONOMIC STABILITY: FOOD INSECURITY: WITHIN THE PAST 12 MONTHS, YOU WORRIED THAT YOUR FOOD WOULD RUN OUT BEFORE YOU GOT MONEY TO BUY MORE.: NEVER TRUE

## 2024-09-16 ASSESSMENT — PATIENT HEALTH QUESTIONNAIRE - PHQ9
2. FEELING DOWN, DEPRESSED OR HOPELESS: NOT AT ALL
SUM OF ALL RESPONSES TO PHQ QUESTIONS 1-9: 0
SUM OF ALL RESPONSES TO PHQ9 QUESTIONS 1 & 2: 0
SUM OF ALL RESPONSES TO PHQ QUESTIONS 1-9: 0
1. LITTLE INTEREST OR PLEASURE IN DOING THINGS: NOT AT ALL

## 2024-09-16 ASSESSMENT — ENCOUNTER SYMPTOMS
EYES NEGATIVE: 1
ROS SKIN COMMENTS: NERVE PAIN
GASTROINTESTINAL NEGATIVE: 1
ALLERGIC/IMMUNOLOGIC NEGATIVE: 1
RESPIRATORY NEGATIVE: 1

## 2024-12-05 DIAGNOSIS — M81.0 AGE-RELATED OSTEOPOROSIS WITHOUT CURRENT PATHOLOGICAL FRACTURE: ICD-10-CM

## 2024-12-05 DIAGNOSIS — E11.9 DIET-CONTROLLED DIABETES MELLITUS (HCC): ICD-10-CM

## 2024-12-05 DIAGNOSIS — I10 ESSENTIAL HYPERTENSION: Chronic | ICD-10-CM

## 2024-12-05 LAB
ALBUMIN: 4.4 G/DL (ref 3.5–5.2)
ALP BLD-CCNC: 136 U/L (ref 35–104)
ALT SERPL-CCNC: 15 U/L (ref 0–32)
ANION GAP SERPL CALCULATED.3IONS-SCNC: 11 MMOL/L (ref 7–16)
AST SERPL-CCNC: 20 U/L (ref 0–31)
BASOPHILS ABSOLUTE: 0.02 K/UL (ref 0–0.2)
BASOPHILS RELATIVE PERCENT: 0 % (ref 0–2)
BILIRUB SERPL-MCNC: 0.9 MG/DL (ref 0–1.2)
BILIRUBIN, URINE: NEGATIVE
BUN BLDV-MCNC: 17 MG/DL (ref 6–23)
CALCIUM SERPL-MCNC: 10 MG/DL (ref 8.6–10.2)
CHLORIDE BLD-SCNC: 103 MMOL/L (ref 98–107)
CHOLESTEROL, TOTAL: 163 MG/DL
CO2: 26 MMOL/L (ref 22–29)
COLOR, UA: YELLOW
COMMENT: ABNORMAL
CREAT SERPL-MCNC: 0.9 MG/DL (ref 0.5–1)
EOSINOPHILS ABSOLUTE: 0.23 K/UL (ref 0.05–0.5)
EOSINOPHILS RELATIVE PERCENT: 4 % (ref 0–6)
GFR, ESTIMATED: 71 ML/MIN/1.73M2
GLUCOSE BLD-MCNC: 104 MG/DL (ref 74–99)
GLUCOSE URINE: NEGATIVE MG/DL
HBA1C MFR BLD: 5.4 % (ref 4–5.6)
HCT VFR BLD CALC: 40.6 % (ref 34–48)
HDLC SERPL-MCNC: 35 MG/DL
HEMOGLOBIN: 13.3 G/DL (ref 11.5–15.5)
IMMATURE GRANULOCYTES %: 1 % (ref 0–5)
IMMATURE GRANULOCYTES ABSOLUTE: 0.03 K/UL (ref 0–0.58)
KETONES, URINE: NEGATIVE MG/DL
LDL CHOLESTEROL: 95 MG/DL
LEUKOCYTE ESTERASE, URINE: NEGATIVE
LYMPHOCYTES ABSOLUTE: 1.56 K/UL (ref 1.5–4)
LYMPHOCYTES RELATIVE PERCENT: 27 % (ref 20–42)
MCH RBC QN AUTO: 30.5 PG (ref 26–35)
MCHC RBC AUTO-ENTMCNC: 32.8 G/DL (ref 32–34.5)
MCV RBC AUTO: 93.1 FL (ref 80–99.9)
MONOCYTES ABSOLUTE: 0.46 K/UL (ref 0.1–0.95)
MONOCYTES RELATIVE PERCENT: 8 % (ref 2–12)
NEUTROPHILS ABSOLUTE: 3.51 K/UL (ref 1.8–7.3)
NEUTROPHILS RELATIVE PERCENT: 60 % (ref 43–80)
NITRITE, URINE: NEGATIVE
PDW BLD-RTO: 12.4 % (ref 11.5–15)
PH, URINE: 5.5 (ref 5–9)
PLATELET # BLD: 232 K/UL (ref 130–450)
PMV BLD AUTO: 12 FL (ref 7–12)
POTASSIUM SERPL-SCNC: 4.2 MMOL/L (ref 3.5–5)
PROTEIN UA: NEGATIVE MG/DL
RBC # BLD: 4.36 M/UL (ref 3.5–5.5)
SODIUM BLD-SCNC: 140 MMOL/L (ref 132–146)
SPECIFIC GRAVITY UA: >1.03 (ref 1–1.03)
TOTAL PROTEIN: 6.9 G/DL (ref 6.4–8.3)
TRIGL SERPL-MCNC: 166 MG/DL
TURBIDITY: CLEAR
URINE HGB: NEGATIVE
UROBILINOGEN, URINE: 0.2 EU/DL (ref 0–1)
VITAMIN D 25-HYDROXY: 35.9 NG/ML (ref 30–100)
VLDLC SERPL CALC-MCNC: 33 MG/DL
WBC # BLD: 5.8 K/UL (ref 4.5–11.5)

## 2024-12-13 ENCOUNTER — TELEPHONE (OUTPATIENT)
Dept: CARDIOLOGY | Age: 74
End: 2024-12-13

## 2024-12-13 ENCOUNTER — OFFICE VISIT (OUTPATIENT)
Dept: PRIMARY CARE CLINIC | Age: 74
End: 2024-12-13

## 2024-12-13 VITALS
SYSTOLIC BLOOD PRESSURE: 138 MMHG | DIASTOLIC BLOOD PRESSURE: 84 MMHG | TEMPERATURE: 97.6 F | OXYGEN SATURATION: 97 % | BODY MASS INDEX: 44.76 KG/M2 | WEIGHT: 269 LBS | HEART RATE: 86 BPM | RESPIRATION RATE: 16 BRPM

## 2024-12-13 DIAGNOSIS — E55.9 VITAMIN D DEFICIENCY: Chronic | ICD-10-CM

## 2024-12-13 DIAGNOSIS — R73.03 PRE-DIABETES: ICD-10-CM

## 2024-12-13 DIAGNOSIS — E53.8 VITAMIN B 12 DEFICIENCY: ICD-10-CM

## 2024-12-13 DIAGNOSIS — E78.2 MIXED HYPERLIPIDEMIA: ICD-10-CM

## 2024-12-13 DIAGNOSIS — I35.0 NONRHEUMATIC AORTIC VALVE STENOSIS: ICD-10-CM

## 2024-12-13 DIAGNOSIS — R01.1 HEART MURMUR: ICD-10-CM

## 2024-12-13 DIAGNOSIS — I10 ESSENTIAL HYPERTENSION: Primary | Chronic | ICD-10-CM

## 2024-12-13 SDOH — ECONOMIC STABILITY: FOOD INSECURITY: WITHIN THE PAST 12 MONTHS, YOU WORRIED THAT YOUR FOOD WOULD RUN OUT BEFORE YOU GOT MONEY TO BUY MORE.: NEVER TRUE

## 2024-12-13 SDOH — ECONOMIC STABILITY: INCOME INSECURITY: HOW HARD IS IT FOR YOU TO PAY FOR THE VERY BASICS LIKE FOOD, HOUSING, MEDICAL CARE, AND HEATING?: NOT HARD AT ALL

## 2024-12-13 SDOH — ECONOMIC STABILITY: FOOD INSECURITY: WITHIN THE PAST 12 MONTHS, THE FOOD YOU BOUGHT JUST DIDN'T LAST AND YOU DIDN'T HAVE MONEY TO GET MORE.: NEVER TRUE

## 2024-12-13 ASSESSMENT — PATIENT HEALTH QUESTIONNAIRE - PHQ9
SUM OF ALL RESPONSES TO PHQ QUESTIONS 1-9: 0
SUM OF ALL RESPONSES TO PHQ QUESTIONS 1-9: 0
2. FEELING DOWN, DEPRESSED OR HOPELESS: NOT AT ALL
SUM OF ALL RESPONSES TO PHQ QUESTIONS 1-9: 0
SUM OF ALL RESPONSES TO PHQ9 QUESTIONS 1 & 2: 0
1. LITTLE INTEREST OR PLEASURE IN DOING THINGS: NOT AT ALL
SUM OF ALL RESPONSES TO PHQ QUESTIONS 1-9: 0

## 2024-12-13 NOTE — TELEPHONE ENCOUNTER
Called patient 1x and spoke with patients  as patient was still at here pcp. Calling to schedule an echo and gave message to her  for patient to call us.    Electronically signed by Danielle Sullivan on 12/13/2024 at 10:33 AM

## 2024-12-13 NOTE — PROGRESS NOTES
avulsion fracture of distal fibula with delayed healing, left     COVID-19     Degenerative joint disease involving multiple joints     Diverticulitis     Hyperlipidemia     Hypertension     Essential hypertension    Liver disease     LLL pneumonia     Obesity     Osteoarthritis     Palpitations 9/17/2012    Sleep apnea     Tricuspid regurgitation     Trochanteric bursitis of right hip     Varicose veins of both lower extremities      Family History   Problem Relation Age of Onset    Lung Cancer Father     Thyroid Disease Sister       Past Surgical History:   Procedure Laterality Date    BREAST SURGERY  1990    cyst lt breast    CARDIAC CATHETERIZATION  06/19/2019    dr garcía    CHOLECYSTECTOMY      COLONOSCOPY  5/2012    HYSTERECTOMY (CERVIX STATUS UNKNOWN)  1980    JOINT REPLACEMENT  2001    L knee    JOINT REPLACEMENT  2001    rt knee    TOTAL KNEE ARTHROPLASTY Left 2/22/2021    LEFT KNEE TOTAL ARTHROPLASTY [place] [facility] [place]      Vitals:    12/13/24 1003   BP: 138/84   Pulse: 86   Resp: 16   Temp: 97.6 °F (36.4 °C)   TempSrc: Temporal   SpO2: 97%   Weight: 122 kg (269 lb)       Objective:    Physical Exam  Vitals reviewed.   Constitutional:       Appearance: Normal appearance. She is well-developed.   HENT:      Head: Normocephalic.      Right Ear: Tympanic membrane normal.      Left Ear: Tympanic membrane normal.      Nose: Nose normal.      Mouth/Throat:      Mouth: Mucous membranes are moist.   Eyes:      Pupils: Pupils are equal, round, and reactive to light.   Cardiovascular:      Rate and Rhythm: Normal rate and regular rhythm.      Heart sounds: Murmur heard.   Pulmonary:      Effort: Pulmonary effort is normal.      Breath sounds: Normal breath sounds.   Abdominal:      General: Bowel sounds are normal.      Palpations: Abdomen is soft.   Musculoskeletal:      Cervical back: Normal range of motion.      Comments: Dec rom r  sh   Skin:     General: Skin is warm.

## 2025-01-28 ENCOUNTER — HOSPITAL ENCOUNTER (OUTPATIENT)
Dept: CARDIOLOGY | Age: 75
Discharge: HOME | End: 2025-01-30
Payer: MEDICARE

## 2025-01-28 VITALS — HEIGHT: 65 IN | WEIGHT: 269 LBS | BODY MASS INDEX: 44.82 KG/M2

## 2025-01-28 DIAGNOSIS — R01.1 HEART MURMUR: ICD-10-CM

## 2025-01-28 DIAGNOSIS — I35.0 NONRHEUMATIC AORTIC VALVE STENOSIS: ICD-10-CM

## 2025-01-28 LAB
ECHO AO ASC DIAM: 2.8 CM
ECHO AO ASCENDING AORTA INDEX: 1.25 CM/M2
ECHO AV AREA PEAK VELOCITY: 1.1 CM2
ECHO AV AREA VTI: 1.2 CM2
ECHO AV AREA/BSA PEAK VELOCITY: 0.5 CM2/M2
ECHO AV AREA/BSA VTI: 0.5 CM2/M2
ECHO AV CUSP MM: 1.5 CM
ECHO AV MEAN GRADIENT: 12 MMHG
ECHO AV MEAN VELOCITY: 1.7 M/S
ECHO AV PEAK GRADIENT: 20 MMHG
ECHO AV PEAK VELOCITY: 2.2 M/S
ECHO AV VELOCITY RATIO: 0.36
ECHO AV VTI: 52 CM
ECHO BSA: 2.37 M2
ECHO EST RA PRESSURE: 3 MMHG
ECHO LA DIAMETER INDEX: 1.74 CM/M2
ECHO LA DIAMETER: 3.9 CM
ECHO LA VOL A-L A2C: 93 ML (ref 22–52)
ECHO LA VOL A-L A4C: 66 ML (ref 22–52)
ECHO LA VOL MOD A2C: 88 ML (ref 22–52)
ECHO LA VOL MOD A4C: 61 ML (ref 22–52)
ECHO LA VOLUME AREA LENGTH: 81 ML
ECHO LA VOLUME INDEX A-L A2C: 42 ML/M2 (ref 16–34)
ECHO LA VOLUME INDEX A-L A4C: 29 ML/M2 (ref 16–34)
ECHO LA VOLUME INDEX AREA LENGTH: 36 ML/M2 (ref 16–34)
ECHO LA VOLUME INDEX MOD A2C: 39 ML/M2 (ref 16–34)
ECHO LA VOLUME INDEX MOD A4C: 27 ML/M2 (ref 16–34)
ECHO LV FRACTIONAL SHORTENING: 30 % (ref 28–44)
ECHO LV INTERNAL DIMENSION DIASTOLE INDEX: 2.05 CM/M2
ECHO LV INTERNAL DIMENSION DIASTOLIC: 4.6 CM (ref 3.9–5.3)
ECHO LV INTERNAL DIMENSION SYSTOLIC INDEX: 1.43 CM/M2
ECHO LV INTERNAL DIMENSION SYSTOLIC: 3.2 CM
ECHO LV ISOVOLUMETRIC RELAXATION TIME (IVRT): 69.2 MS
ECHO LV IVSD: 1.4 CM (ref 0.6–0.9)
ECHO LV IVSS: 2.2 CM
ECHO LV MASS 2D: 256.8 G (ref 67–162)
ECHO LV MASS INDEX 2D: 114.6 G/M2 (ref 43–95)
ECHO LV POSTERIOR WALL DIASTOLIC: 1.4 CM (ref 0.6–0.9)
ECHO LV POSTERIOR WALL SYSTOLIC: 1.8 CM
ECHO LV RELATIVE WALL THICKNESS RATIO: 0.61
ECHO LVOT AREA: 3.1 CM2
ECHO LVOT AV VTI INDEX: 0.38
ECHO LVOT DIAM: 2 CM
ECHO LVOT MEAN GRADIENT: 2 MMHG
ECHO LVOT PEAK GRADIENT: 3 MMHG
ECHO LVOT PEAK VELOCITY: 0.8 M/S
ECHO LVOT STROKE VOLUME INDEX: 27.8 ML/M2
ECHO LVOT SV: 62.2 ML
ECHO LVOT VTI: 19.8 CM
ECHO MV "A" WAVE DURATION: 148.8 MSEC
ECHO MV A VELOCITY: 1.17 M/S
ECHO MV AREA PHT: 3.4 CM2
ECHO MV AREA VTI: 2.1 CM2
ECHO MV E DECELERATION TIME (DT): 183.8 MS
ECHO MV E VELOCITY: 0.98 M/S
ECHO MV E/A RATIO: 0.84
ECHO MV LVOT VTI INDEX: 1.46
ECHO MV MAX VELOCITY: 1.4 M/S
ECHO MV MEAN GRADIENT: 4 MMHG
ECHO MV MEAN VELOCITY: 0.9 M/S
ECHO MV PEAK GRADIENT: 8 MMHG
ECHO MV PRESSURE HALF TIME (PHT): 64.1 MS
ECHO MV VTI: 29 CM
ECHO PV MAX VELOCITY: 1.1 M/S
ECHO PV MEAN GRADIENT: 3 MMHG
ECHO PV MEAN VELOCITY: 0.8 M/S
ECHO PV PEAK GRADIENT: 5 MMHG
ECHO PV VTI: 22.7 CM
ECHO PVEIN A DURATION: 96.9 MS
ECHO PVEIN A VELOCITY: 0.3 M/S
ECHO PVEIN PEAK D VELOCITY: 0.5 M/S
ECHO PVEIN PEAK S VELOCITY: 0.7 M/S
ECHO PVEIN S/D RATIO: 1.4
ECHO RIGHT VENTRICULAR SYSTOLIC PRESSURE (RVSP): 25 MMHG
ECHO RV INTERNAL DIMENSION: 2.6 CM
ECHO TV REGURGITANT MAX VELOCITY: 2.32 M/S
ECHO TV REGURGITANT PEAK GRADIENT: 21 MMHG

## 2025-01-28 PROCEDURE — 6360000004 HC RX CONTRAST MEDICATION: Performed by: FAMILY MEDICINE

## 2025-01-28 PROCEDURE — C8929 TTE W OR WO FOL WCON,DOPPLER: HCPCS

## 2025-01-28 RX ADMIN — SULFUR HEXAFLUORIDE 2 ML: KIT at 08:44

## 2025-02-03 LAB
ECHO AO ASC DIAM: 2.8 CM
ECHO AO ASCENDING AORTA INDEX: 1.25 CM/M2
ECHO AV AREA PEAK VELOCITY: 1.1 CM2
ECHO AV AREA VTI: 1.2 CM2
ECHO AV AREA/BSA PEAK VELOCITY: 0.5 CM2/M2
ECHO AV AREA/BSA VTI: 0.5 CM2/M2
ECHO AV CUSP MM: 1.5 CM
ECHO AV MEAN GRADIENT: 12 MMHG
ECHO AV MEAN VELOCITY: 1.7 M/S
ECHO AV PEAK GRADIENT: 20 MMHG
ECHO AV PEAK VELOCITY: 2.2 M/S
ECHO AV VELOCITY RATIO: 0.36
ECHO AV VTI: 52 CM
ECHO BSA: 2.37 M2
ECHO EST RA PRESSURE: 3 MMHG
ECHO LA DIAMETER INDEX: 1.74 CM/M2
ECHO LA DIAMETER: 3.9 CM
ECHO LA VOL A-L A2C: 93 ML (ref 22–52)
ECHO LA VOL A-L A4C: 66 ML (ref 22–52)
ECHO LA VOL MOD A2C: 88 ML (ref 22–52)
ECHO LA VOL MOD A4C: 61 ML (ref 22–52)
ECHO LA VOLUME AREA LENGTH: 81 ML
ECHO LA VOLUME INDEX A-L A2C: 42 ML/M2 (ref 16–34)
ECHO LA VOLUME INDEX A-L A4C: 29 ML/M2 (ref 16–34)
ECHO LA VOLUME INDEX AREA LENGTH: 36 ML/M2 (ref 16–34)
ECHO LA VOLUME INDEX MOD A2C: 39 ML/M2 (ref 16–34)
ECHO LA VOLUME INDEX MOD A4C: 27 ML/M2 (ref 16–34)
ECHO LV EF PHYSICIAN: 65 %
ECHO LV FRACTIONAL SHORTENING: 30 % (ref 28–44)
ECHO LV INTERNAL DIMENSION DIASTOLE INDEX: 2.05 CM/M2
ECHO LV INTERNAL DIMENSION DIASTOLIC: 4.6 CM (ref 3.9–5.3)
ECHO LV INTERNAL DIMENSION SYSTOLIC INDEX: 1.43 CM/M2
ECHO LV INTERNAL DIMENSION SYSTOLIC: 3.2 CM
ECHO LV ISOVOLUMETRIC RELAXATION TIME (IVRT): 69.2 MS
ECHO LV IVSD: 1.4 CM (ref 0.6–0.9)
ECHO LV IVSS: 2.2 CM
ECHO LV MASS 2D: 256.8 G (ref 67–162)
ECHO LV MASS INDEX 2D: 114.6 G/M2 (ref 43–95)
ECHO LV POSTERIOR WALL DIASTOLIC: 1.4 CM (ref 0.6–0.9)
ECHO LV POSTERIOR WALL SYSTOLIC: 1.8 CM
ECHO LV RELATIVE WALL THICKNESS RATIO: 0.61
ECHO LVOT AREA: 3.1 CM2
ECHO LVOT AV VTI INDEX: 0.38
ECHO LVOT DIAM: 2 CM
ECHO LVOT MEAN GRADIENT: 2 MMHG
ECHO LVOT PEAK GRADIENT: 3 MMHG
ECHO LVOT PEAK VELOCITY: 0.8 M/S
ECHO LVOT STROKE VOLUME INDEX: 27.8 ML/M2
ECHO LVOT SV: 62.2 ML
ECHO LVOT VTI: 19.8 CM
ECHO MV "A" WAVE DURATION: 148.8 MSEC
ECHO MV A VELOCITY: 1.17 M/S
ECHO MV AREA PHT: 3.4 CM2
ECHO MV AREA VTI: 2.1 CM2
ECHO MV E DECELERATION TIME (DT): 183.8 MS
ECHO MV E VELOCITY: 0.98 M/S
ECHO MV E/A RATIO: 0.84
ECHO MV LVOT VTI INDEX: 1.46
ECHO MV MAX VELOCITY: 1.4 M/S
ECHO MV MEAN GRADIENT: 4 MMHG
ECHO MV MEAN VELOCITY: 0.9 M/S
ECHO MV PEAK GRADIENT: 8 MMHG
ECHO MV PRESSURE HALF TIME (PHT): 64.1 MS
ECHO MV VTI: 29 CM
ECHO PV MAX VELOCITY: 1.1 M/S
ECHO PV MEAN GRADIENT: 3 MMHG
ECHO PV MEAN VELOCITY: 0.8 M/S
ECHO PV PEAK GRADIENT: 5 MMHG
ECHO PV VTI: 22.7 CM
ECHO PVEIN A DURATION: 96.9 MS
ECHO PVEIN A VELOCITY: 0.3 M/S
ECHO PVEIN PEAK D VELOCITY: 0.5 M/S
ECHO PVEIN PEAK S VELOCITY: 0.7 M/S
ECHO PVEIN S/D RATIO: 1.4
ECHO RIGHT VENTRICULAR SYSTOLIC PRESSURE (RVSP): 25 MMHG
ECHO RV INTERNAL DIMENSION: 2.6 CM
ECHO TV REGURGITANT MAX VELOCITY: 2.32 M/S
ECHO TV REGURGITANT PEAK GRADIENT: 21 MMHG

## 2025-04-28 ENCOUNTER — OFFICE VISIT (OUTPATIENT)
Dept: PRIMARY CARE CLINIC | Age: 75
End: 2025-04-28
Payer: MEDICARE

## 2025-04-28 VITALS
DIASTOLIC BLOOD PRESSURE: 84 MMHG | TEMPERATURE: 97.6 F | OXYGEN SATURATION: 98 % | WEIGHT: 274 LBS | HEART RATE: 90 BPM | RESPIRATION RATE: 16 BRPM | SYSTOLIC BLOOD PRESSURE: 136 MMHG | BODY MASS INDEX: 45.6 KG/M2

## 2025-04-28 DIAGNOSIS — Z01.818 PRE-OP EXAM: Primary | ICD-10-CM

## 2025-04-28 DIAGNOSIS — M19.042 PRIMARY OSTEOARTHRITIS OF LEFT HAND: ICD-10-CM

## 2025-04-28 DIAGNOSIS — I10 ESSENTIAL HYPERTENSION: Chronic | ICD-10-CM

## 2025-04-28 DIAGNOSIS — R94.31 ABNORMAL EKG: ICD-10-CM

## 2025-04-28 PROCEDURE — G8427 DOCREV CUR MEDS BY ELIG CLIN: HCPCS | Performed by: FAMILY MEDICINE

## 2025-04-28 PROCEDURE — 93000 ELECTROCARDIOGRAM COMPLETE: CPT | Performed by: FAMILY MEDICINE

## 2025-04-28 PROCEDURE — 1036F TOBACCO NON-USER: CPT | Performed by: FAMILY MEDICINE

## 2025-04-28 PROCEDURE — 99214 OFFICE O/P EST MOD 30 MIN: CPT | Performed by: FAMILY MEDICINE

## 2025-04-28 PROCEDURE — G8417 CALC BMI ABV UP PARAM F/U: HCPCS | Performed by: FAMILY MEDICINE

## 2025-04-28 PROCEDURE — 3079F DIAST BP 80-89 MM HG: CPT | Performed by: FAMILY MEDICINE

## 2025-04-28 PROCEDURE — 1090F PRES/ABSN URINE INCON ASSESS: CPT | Performed by: FAMILY MEDICINE

## 2025-04-28 PROCEDURE — G8399 PT W/DXA RESULTS DOCUMENT: HCPCS | Performed by: FAMILY MEDICINE

## 2025-04-28 PROCEDURE — 1123F ACP DISCUSS/DSCN MKR DOCD: CPT | Performed by: FAMILY MEDICINE

## 2025-04-28 PROCEDURE — 3075F SYST BP GE 130 - 139MM HG: CPT | Performed by: FAMILY MEDICINE

## 2025-04-28 PROCEDURE — 1159F MED LIST DOCD IN RCRD: CPT | Performed by: FAMILY MEDICINE

## 2025-04-28 ASSESSMENT — PATIENT HEALTH QUESTIONNAIRE - PHQ9
SUM OF ALL RESPONSES TO PHQ QUESTIONS 1-9: 0
1. LITTLE INTEREST OR PLEASURE IN DOING THINGS: NOT AT ALL
2. FEELING DOWN, DEPRESSED OR HOPELESS: NOT AT ALL

## 2025-04-28 NOTE — PROGRESS NOTES
Anisha Bustamante (:  1950) is a 74 y.o. female,    here for evaluation of the following chief complaint(s):  Pre-op Exam            Subjective   History of Present Illness  The patient presents for a preoperative visit for left thumb surgery with orthopedics on 2025.    No chest pain or shortness of breath is reported. An EKG has shown some abnormalities, necessitating preoperative clearance with cardiology. The last visit was in 2024. A form has been provided to be completed by the physician, and clearance from cardiology is awaited.    Review of Systems:  Constitutional:  No fever, no fatigue, no chills, no headaches, no weight change  Dermatology:  No rash, no mole, no dry or sensitive skin  ENT:  No cough, no sore throat, no sinus pain, no runny nose, no ear pain  Cardiology:  No chest pain, no palpitations, no leg edema, no shortness of breath, no PND  Gastroenterology:  No dysphagia, no abdominal pain, no nausea, no vomiting, no constipation, no diarrhea, no heartburn  Musculoskeletal:  hpies  Respiratory:  No shortness of breath, no orthopnea, no wheezing, no CONCEPCION, no hemoptysis  Urology:  No blood in the urine, no urinary frequency, no urinary incontinence, no urinary urgency, no nocturia, no dysuria           Current Outpatient Medications:     gabapentin (NEURONTIN) 300 MG capsule, One q hs for three days then one bid thereafter--supper and bedtime, Disp: 120 capsule, Rfl: 4    lidocaine (XYLOCAINE) 5 % ointment, Apply topically every 4 hrs as needed, Disp: 120 g, Rfl: 5    nystatin (MYCOSTATIN) 428870 UNIT/GM cream, Apply topically 4 times daily., Disp: 120 g, Rfl: 5    losartan (COZAAR) 50 MG tablet, Take 1 tablet by mouth daily replaces norvasc, Disp: 90 tablet, Rfl: 3    buPROPion (WELLBUTRIN XL) 300 MG extended release tablet, Take 1 tablet by mouth every morning, Disp: 90 tablet, Rfl: 3    baclofen (LIORESAL) 10 MG tablet, Take 1 tablet by mouth 3 times daily as needed

## 2025-05-01 ENCOUNTER — OFFICE VISIT (OUTPATIENT)
Dept: CARDIOLOGY CLINIC | Age: 75
End: 2025-05-01
Payer: MEDICARE

## 2025-05-01 VITALS
HEART RATE: 89 BPM | WEIGHT: 272 LBS | DIASTOLIC BLOOD PRESSURE: 82 MMHG | RESPIRATION RATE: 17 BRPM | HEIGHT: 65 IN | BODY MASS INDEX: 45.32 KG/M2 | SYSTOLIC BLOOD PRESSURE: 136 MMHG

## 2025-05-01 DIAGNOSIS — R01.1 HEART MURMUR: ICD-10-CM

## 2025-05-01 DIAGNOSIS — I10 ESSENTIAL HYPERTENSION: Primary | Chronic | ICD-10-CM

## 2025-05-01 PROCEDURE — 3079F DIAST BP 80-89 MM HG: CPT | Performed by: INTERNAL MEDICINE

## 2025-05-01 PROCEDURE — G2211 COMPLEX E/M VISIT ADD ON: HCPCS | Performed by: INTERNAL MEDICINE

## 2025-05-01 PROCEDURE — 1123F ACP DISCUSS/DSCN MKR DOCD: CPT | Performed by: INTERNAL MEDICINE

## 2025-05-01 PROCEDURE — G8427 DOCREV CUR MEDS BY ELIG CLIN: HCPCS | Performed by: INTERNAL MEDICINE

## 2025-05-01 PROCEDURE — G0537 PR RISK ASCVD TST ONCE PR 12 MO: HCPCS | Performed by: INTERNAL MEDICINE

## 2025-05-01 PROCEDURE — 93000 ELECTROCARDIOGRAM COMPLETE: CPT | Performed by: INTERNAL MEDICINE

## 2025-05-01 PROCEDURE — 1036F TOBACCO NON-USER: CPT | Performed by: INTERNAL MEDICINE

## 2025-05-01 PROCEDURE — 1090F PRES/ABSN URINE INCON ASSESS: CPT | Performed by: INTERNAL MEDICINE

## 2025-05-01 PROCEDURE — 3075F SYST BP GE 130 - 139MM HG: CPT | Performed by: INTERNAL MEDICINE

## 2025-05-01 PROCEDURE — 99214 OFFICE O/P EST MOD 30 MIN: CPT | Performed by: INTERNAL MEDICINE

## 2025-05-01 PROCEDURE — G8399 PT W/DXA RESULTS DOCUMENT: HCPCS | Performed by: INTERNAL MEDICINE

## 2025-05-01 PROCEDURE — 1159F MED LIST DOCD IN RCRD: CPT | Performed by: INTERNAL MEDICINE

## 2025-05-01 PROCEDURE — G8417 CALC BMI ABV UP PARAM F/U: HCPCS | Performed by: INTERNAL MEDICINE

## 2025-05-01 ASSESSMENT — ENCOUNTER SYMPTOMS
ABDOMINAL PAIN: 0
VOMITING: 0
BLOOD IN STOOL: 0
NAUSEA: 0
BACK PAIN: 0
COUGH: 0
CHEST TIGHTNESS: 0
SHORTNESS OF BREATH: 0

## 2025-05-01 NOTE — PROGRESS NOTES
He has 29.1% burden by Holter monitor on 5/31/19. The morphology is RBBB and R axis. Amiodarone started 6/5/19. The burden dropped to <1% by Holter 7/12/19.     1. Continue with Amiodarone therapy.    
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 The cardiac cath 5/8/19 showed mild CAD. Cardiac MRI 5/22/19 showed inflammation suggest cardiotoxicity. His CMP could be due to viral infection vs. Chemotherapy vs. Increased PVC burden. His LVEF remained at 31% by Echo 8/23/19 despite controlled PVC burden.   Repeat echo 8/24/2020 showed LVEF at 36%.    1. Discussed with patient the findings and options of management.  2. Continue with medical therapy per Dr Bo    
Normal function and healed incision.  Continue current programming.  Follow up in 6 months with Jocelyn Zarate NP.  Remote monitor in 3 months via Merlin.  
Started 6/5/2019.  Tolerating well.    LFT 11/4/2020 - normal  TSH 9/23/2020 - normal  CXR 10/22/2019 - normal  Chest CT 9/18/2020 - no Amio effect  PFT 12/11/2019 - normal  Eye exam November 2020    1. Continue current dose.  2. Check TSH and LFT  3. Going for PFT in September 2021.  4. Check Chest X-Ray in September.   5. Check Eye exam in November.   
Tolerating.  Per PCP for DVT.  
12/05/2024 09:55 AM     12/05/2024 09:55 AM      Lab Results   Component Value Date/Time     12/05/2024 09:55 AM    K 4.2 12/05/2024 09:55 AM    K 3.5 02/25/2021 03:46 AM    CO2 26 12/05/2024 09:55 AM    BUN 17 12/05/2024 09:55 AM    CREATININE 0.9 12/05/2024 09:55 AM    MG 2.2 02/25/2021 03:46 AM      No components found for: \"HGBA1C\"   Lab Results   Component Value Date/Time    TSH 2.31 02/05/2024 10:34 AM      No components found for: \"LDLCALC\"       The 10-year ASCVD risk score (Dori KNOTT, et al., 2019) is: 42.8%      ASSESSMENT & PLAN:     73 y.o. female referred for preop evaluation due to abnormal EKG. Past medical history of HTN, HLD, basal cell carcinoma of right shoulder.    Preop evaluation:  Patient has normal exercise tolerance and able to perform her daily activities without cardiac symptoms.          ASA: II  Revised Cardiac Risk Index score: I, with estimated 30-day risk of death, MI or cardiac arrest of 0.9%    Per ACC/AHA guidelines, it is reasonable to proceed with surgery without further cardiac work-up.     Recommendations:    Aortic valve sclerosis:  Patient with aortic valve sclerosis and calcification without evidence of stenosis or regurgitation  She is asymptomatic from cardiac standpoint  She can perform more than 4 METS without symptoms  We will follow her up with periodic echocardiograms every 2 - 3 years  HLD:  Continue Simvastatin 40 mg daily  HTN:  Continue Losartan 50 mg daily      RTC in 1 year        Kiran Spangler MD  Interventional cardiology / structural heart disease    [facility]  [address] [address] [phone]

## 2025-06-04 DIAGNOSIS — F41.9 ANXIETY: ICD-10-CM

## 2025-06-04 DIAGNOSIS — I10 ESSENTIAL HYPERTENSION: Chronic | ICD-10-CM

## 2025-06-04 RX ORDER — BUPROPION HYDROCHLORIDE 300 MG/1
300 TABLET ORAL EVERY MORNING
Qty: 90 TABLET | Refills: 3 | Status: SHIPPED | OUTPATIENT
Start: 2025-06-04

## 2025-06-04 RX ORDER — LOSARTAN POTASSIUM 50 MG/1
50 TABLET ORAL DAILY
Qty: 90 TABLET | Refills: 3 | Status: SHIPPED | OUTPATIENT
Start: 2025-06-04

## 2025-06-06 DIAGNOSIS — E78.2 MIXED HYPERLIPIDEMIA: ICD-10-CM

## 2025-06-06 DIAGNOSIS — I10 ESSENTIAL HYPERTENSION: Chronic | ICD-10-CM

## 2025-06-06 DIAGNOSIS — R73.03 PRE-DIABETES: ICD-10-CM

## 2025-06-06 DIAGNOSIS — E55.9 VITAMIN D DEFICIENCY: Chronic | ICD-10-CM

## 2025-06-06 DIAGNOSIS — E53.8 VITAMIN B 12 DEFICIENCY: ICD-10-CM

## 2025-06-06 LAB
ALBUMIN: 4.1 G/DL (ref 3.5–5.2)
ALP BLD-CCNC: 141 U/L (ref 35–104)
ALT SERPL-CCNC: 19 U/L (ref 0–35)
ANION GAP SERPL CALCULATED.3IONS-SCNC: 11 MMOL/L (ref 7–16)
AST SERPL-CCNC: 25 U/L (ref 0–35)
BACTERIA: ABNORMAL
BILIRUB SERPL-MCNC: 0.8 MG/DL (ref 0–1.2)
BILIRUBIN, URINE: NEGATIVE
BUN BLDV-MCNC: 15 MG/DL (ref 8–23)
CALCIUM SERPL-MCNC: 9.8 MG/DL (ref 8.8–10.2)
CHLORIDE BLD-SCNC: 108 MMOL/L (ref 98–107)
CHOLESTEROL, TOTAL: 141 MG/DL
CO2: 23 MMOL/L (ref 22–29)
COLOR, UA: YELLOW
CREAT SERPL-MCNC: 0.8 MG/DL (ref 0.5–1)
EPITHELIAL CELLS, UA: ABNORMAL /HPF
GFR, ESTIMATED: 78 ML/MIN/1.73M2
GLUCOSE BLD-MCNC: 103 MG/DL (ref 74–99)
GLUCOSE URINE: NEGATIVE MG/DL
HBA1C MFR BLD: 5.7 % (ref 4–5.6)
HCT VFR BLD CALC: 38.6 % (ref 34–48)
HDLC SERPL-MCNC: 43 MG/DL
HEMOGLOBIN: 12.6 G/DL (ref 11.5–15.5)
KETONES, URINE: NEGATIVE MG/DL
LDL CHOLESTEROL: 73 MG/DL
LEUKOCYTE ESTERASE, URINE: NEGATIVE
MCH RBC QN AUTO: 29.5 PG (ref 26–35)
MCHC RBC AUTO-ENTMCNC: 32.6 G/DL (ref 32–34.5)
MCV RBC AUTO: 90.4 FL (ref 80–99.9)
NITRITE, URINE: NEGATIVE
PDW BLD-RTO: 13.3 % (ref 11.5–15)
PH, URINE: 5.5 (ref 5–8)
PLATELET # BLD: 229 K/UL (ref 130–450)
PMV BLD AUTO: 11.4 FL (ref 7–12)
POTASSIUM SERPL-SCNC: 3.9 MMOL/L (ref 3.5–5.1)
PROTEIN UA: NEGATIVE MG/DL
RBC # BLD: 4.27 M/UL (ref 3.5–5.5)
RBC UA: ABNORMAL /HPF
SODIUM BLD-SCNC: 142 MMOL/L (ref 136–145)
SPECIFIC GRAVITY UA: >1.03 (ref 1–1.03)
TOTAL PROTEIN: 6.8 G/DL (ref 6.4–8.3)
TRIGL SERPL-MCNC: 125 MG/DL
TURBIDITY: CLEAR
URINE HGB: ABNORMAL
UROBILINOGEN, URINE: 0.2 EU/DL (ref 0–1)
VITAMIN B-12: 674 PG/ML (ref 232–1245)
VITAMIN D 25-HYDROXY: 36.3 NG/ML (ref 30–100)
VLDLC SERPL CALC-MCNC: 25 MG/DL
WBC # BLD: 5.8 K/UL (ref 4.5–11.5)
WBC UA: ABNORMAL /HPF

## 2025-06-13 ENCOUNTER — OFFICE VISIT (OUTPATIENT)
Dept: PRIMARY CARE CLINIC | Age: 75
End: 2025-06-13

## 2025-06-13 VITALS
HEIGHT: 65 IN | BODY MASS INDEX: 45.48 KG/M2 | SYSTOLIC BLOOD PRESSURE: 135 MMHG | TEMPERATURE: 97 F | HEART RATE: 79 BPM | OXYGEN SATURATION: 98 % | DIASTOLIC BLOOD PRESSURE: 82 MMHG | WEIGHT: 273 LBS

## 2025-06-13 DIAGNOSIS — E66.01 MORBID (SEVERE) OBESITY DUE TO EXCESS CALORIES (HCC): Chronic | ICD-10-CM

## 2025-06-13 DIAGNOSIS — E55.9 VITAMIN D DEFICIENCY: ICD-10-CM

## 2025-06-13 DIAGNOSIS — E78.2 MIXED HYPERLIPIDEMIA: ICD-10-CM

## 2025-06-13 DIAGNOSIS — B02.29 POST HERPETIC NEURALGIA: ICD-10-CM

## 2025-06-13 DIAGNOSIS — I10 ESSENTIAL HYPERTENSION: ICD-10-CM

## 2025-06-13 DIAGNOSIS — Z00.00 MEDICARE ANNUAL WELLNESS VISIT, SUBSEQUENT: Primary | ICD-10-CM

## 2025-06-13 DIAGNOSIS — R73.03 PRE-DIABETES: Chronic | ICD-10-CM

## 2025-06-13 RX ORDER — GABAPENTIN 300 MG/1
300 CAPSULE ORAL
Qty: 90 CAPSULE | Refills: 3 | Status: SHIPPED | OUTPATIENT
Start: 2025-06-13 | End: 2026-06-17

## 2025-06-13 ASSESSMENT — LIFESTYLE VARIABLES: HOW MANY STANDARD DRINKS CONTAINING ALCOHOL DO YOU HAVE ON A TYPICAL DAY: PATIENT DOES NOT DRINK

## 2025-06-13 ASSESSMENT — PATIENT HEALTH QUESTIONNAIRE - PHQ9
SUM OF ALL RESPONSES TO PHQ QUESTIONS 1-9: 0
1. LITTLE INTEREST OR PLEASURE IN DOING THINGS: NOT AT ALL
SUM OF ALL RESPONSES TO PHQ QUESTIONS 1-9: 0
2. FEELING DOWN, DEPRESSED OR HOPELESS: NOT AT ALL
SUM OF ALL RESPONSES TO PHQ QUESTIONS 1-9: 0
SUM OF ALL RESPONSES TO PHQ QUESTIONS 1-9: 0

## 2025-06-13 NOTE — PATIENT INSTRUCTIONS
information.    Personalized Preventive Plan for Anisha Bustamante - 6/13/2025  Medicare offers a range of preventive health benefits. Some of the tests and screenings are paid in full while other may be subject to a deductible, co-insurance, and/or copay.  Some of these benefits include a comprehensive review of your medical history including lifestyle, illnesses that may run in your family, and various assessments and screenings as appropriate.  After reviewing your medical record and screening and assessments performed today your provider may have ordered immunizations, labs, imaging, and/or referrals for you.  A list of these orders (if applicable) as well as your Preventive Care list are included within your After Visit Summary for your review.

## 2025-06-13 NOTE — PROGRESS NOTES
all medications.  Make sure they were correct and educated  on the risk associated with missing meds or taking them incorrectly.  A list of medications is being sent home with patient today.    Check blood pressure at home twice a day.  Low-salt low caffeine diet.  Call if systolic blood pressure is above 150 and diastolic blood pressures above 85.  Only use a upper arm digital cuff.  A great deal of time spent reviewing medications, diet, exercise, social issues. Also reviewing the chart before entering the room with patient and finishing charting after leaving patient's room. More than half of that time was spent face to face with the patient in counseling and coordinating care.  Aggressive low-fat diet.  Avoid red meats, greasy fried foods, dairy products.  Avoid processed foods.  Take cholesterol medications without food.    I informed patient about the risk associated with noncompliance of medication and taking medications incorrectly.  Appropriate follow-up with myself and all specialist.  Encourage family members to take active role in assisting with medications and medical care.  If any confusion should develop to notify my office immediately to avoid risk of worsening medical condition            The patient (or guardian, if applicable) and other individuals in attendance with the patient were advised that Artificial Intelligence will be utilized during this visit to record and process the conversation to generate a clinical note. The patient (or guardian, if applicable) and other individuals in attendance at the appointment consented to the use of AI, including the recording.

## 2025-06-23 NOTE — PROGRESS NOTES
[facility] PRE-ADMISSION TESTING INSTRUCTIONS    The Preadmission Testing patient is instructed accordingly using the following criteria (check applicable):    Procedure date:6/25/2025/  Arrival time:1030  Start time:1230  [x] You can expect a call the business day prior to procedure to notify you if your arrival time changes    ARRIVAL INSTRUCTIONS:  [x] Parking the day of Surgery is located in the Main Entrance lot.  Upon entering at Entrance A, make an immediate right to the surgery reception desk    [x] Bring photo ID and insurance card    [x] Bring in a copy of Living will or Durable Power of  papers.    [x] Please be sure to arrange for a responsible adult to provide transportation to and from the hospital    [x] Please arrange for a responsible adult to be with you for the 24 hour period post procedure due to having anesthesia    [x] If you awake morning of surgery not feeling well or have temperature >100 please call [phone]    GENERAL INSTRUCTIONS:    [x] No solid foods after midnight, may have up to 8oz of water until 4 hours prior to surgery. No gum, no candy, no mints.       [x] You may brush your teeth and use mouthwash    [x] Take medications as instructed     Read back / verified medication instructions with patient  Bupropion    [x] Stop herbal supplements and vitamins within 5 days (or as of now) prior to procedure    [x] Morning of surgery, Shower or bathe with your usual products - wash hair, face and body - do not apply any products to any area after washing    [x] No nail polish, make up or hair products on the day of surgery    [x] No tobacco or Vaping products, No alcohol or illegal drug use, marijuana included, within 24 hours of surgery.    [x] Leave all Jewelry and valuables at home, no type of jewelry, no body piercings are allowed in the surgery room  / Bring cases for eyeglasses, contact lenses and dentures     [x] If you receive a survey after

## 2025-06-24 ENCOUNTER — ANESTHESIA EVENT (OUTPATIENT)
Dept: OPERATING ROOM | Age: 75
End: 2025-06-24
Payer: MEDICARE

## 2025-06-24 ASSESSMENT — LIFESTYLE VARIABLES: SMOKING_STATUS: 0

## 2025-06-24 NOTE — ANESTHESIA PRE PROCEDURE
Department of Anesthesiology  Preprocedure Note       Name:  Anisha Bustamante   Age:  74 y.o.  :  1950                                          MRN:  [id number]         Date:  2025      Surgeon: Surgeon(s):  Arabella Camacho MD    Procedure: Procedure(s):  LEFT THUMB LIGAMENT RECONSTRUCTION TENDON INTERPOSITION ARTHROPLASTY EXCISION TRAPEZIUM LEFT WRIST FLEXOR TENDON TRANSFER    Medications prior to admission:   Prior to Admission medications    Medication Sig Start Date End Date Taking? Authorizing Provider   gabapentin (NEURONTIN) 300 MG capsule Take 1 capsule by mouth nightly. One q hs for three days then one bid thereafter--supper and bedtime 25  Manan Gallegos,    buPROPion (WELLBUTRIN XL) 300 MG extended release tablet Take 1 tablet by mouth every morning 25   Manan Gallegos, DO   losartan (COZAAR) 50 MG tablet Take 1 tablet by mouth daily replaces norvasc 25   Manan Gallegos DO   simvastatin (ZOCOR) 40 MG tablet Take 1 tablet by mouth nightly 23   Manan Gallegos, DO   Multiple Vitamins-Minerals (CENTRUM SILVER 50+WOMEN PO) Take by mouth    ProviderShannan MD   vitamin D (CHOLECALCIFEROL) 5000 UNITS CAPS capsule Take 1 capsule by mouth daily    Shannan Carter MD   Coenzyme Q10 (CO Q 10 PO) Take 1 tablet by mouth daily.      ProviderShannan MD       Current medications:    No current facility-administered medications for this encounter.     Current Outpatient Medications   Medication Sig Dispense Refill    gabapentin (NEURONTIN) 300 MG capsule Take 1 capsule by mouth nightly. One q hs for three days then one bid thereafter--supper and bedtime 90 capsule 3    buPROPion (WELLBUTRIN XL) 300 MG extended release tablet Take 1 tablet by mouth every morning 90 tablet 3    losartan (COZAAR) 50 MG tablet Take 1 tablet by mouth daily replaces norvasc 90 tablet 3    simvastatin (ZOCOR) 40 MG tablet Take 1 tablet by mouth nightly 90 tablet 5

## 2025-06-24 NOTE — PROGRESS NOTES
Patient reports was not samuel to be intubated for a shoulder surgery at [facility] office 12/16/2024 - spoke to Soraida at Dr Camacho's office - requested the anesthesia records be faxed to PAT office.    Spoke to Dr Menjivar - reported the above - Dr Menjivar sated nothing more is needed prior to day of surgery.

## 2025-06-25 ENCOUNTER — HOSPITAL ENCOUNTER (OUTPATIENT)
Age: 75
Setting detail: OUTPATIENT SURGERY
Discharge: HOME | End: 2025-06-25
Attending: ORTHOPAEDIC SURGERY | Admitting: ORTHOPAEDIC SURGERY
Payer: MEDICARE

## 2025-06-25 ENCOUNTER — ANESTHESIA (OUTPATIENT)
Dept: OPERATING ROOM | Age: 75
End: 2025-06-25
Payer: MEDICARE

## 2025-06-25 ENCOUNTER — APPOINTMENT (OUTPATIENT)
Dept: GENERAL RADIOLOGY | Age: 75
End: 2025-06-25
Attending: ORTHOPAEDIC SURGERY
Payer: MEDICARE

## 2025-06-25 VITALS
OXYGEN SATURATION: 96 % | SYSTOLIC BLOOD PRESSURE: 156 MMHG | RESPIRATION RATE: 21 BRPM | BODY MASS INDEX: 45.65 KG/M2 | WEIGHT: 274 LBS | TEMPERATURE: 97.4 F | HEART RATE: 82 BPM | HEIGHT: 65 IN | DIASTOLIC BLOOD PRESSURE: 75 MMHG

## 2025-06-25 LAB
ANION GAP SERPL CALCULATED.3IONS-SCNC: 13 MMOL/L (ref 7–16)
BUN SERPL-MCNC: 13 MG/DL (ref 8–23)
CALCIUM SERPL-MCNC: 9.5 MG/DL (ref 8.8–10.2)
CHLORIDE SERPL-SCNC: 105 MMOL/L (ref 98–107)
CO2 SERPL-SCNC: 21 MMOL/L (ref 22–29)
CREAT SERPL-MCNC: 0.7 MG/DL (ref 0.5–1)
ERYTHROCYTE [DISTWIDTH] IN BLOOD BY AUTOMATED COUNT: 13.9 % (ref 11.5–15)
GFR, ESTIMATED: 88 ML/MIN/1.73M2
GLUCOSE SERPL-MCNC: 100 MG/DL (ref 74–99)
HCT VFR BLD AUTO: 37.4 % (ref 34–48)
HGB BLD-MCNC: 12.3 G/DL (ref 11.5–15.5)
MCH RBC QN AUTO: 29.6 PG (ref 26–35)
MCHC RBC AUTO-ENTMCNC: 32.9 G/DL (ref 32–34.5)
MCV RBC AUTO: 89.9 FL (ref 80–99.9)
PLATELET # BLD AUTO: 230 K/UL (ref 130–450)
PMV BLD AUTO: 11.4 FL (ref 7–12)
POTASSIUM SERPL-SCNC: 4.2 MMOL/L (ref 3.5–5.1)
RBC # BLD AUTO: 4.16 M/UL (ref 3.5–5.5)
SODIUM SERPL-SCNC: 139 MMOL/L (ref 136–145)
WBC OTHER # BLD: 5.5 K/UL (ref 4.5–11.5)

## 2025-06-25 PROCEDURE — 7100000001 HC PACU RECOVERY - ADDTL 15 MIN: Performed by: ORTHOPAEDIC SURGERY

## 2025-06-25 PROCEDURE — 85027 COMPLETE CBC AUTOMATED: CPT

## 2025-06-25 PROCEDURE — 2500000003 HC RX 250 WO HCPCS: Performed by: ORTHOPAEDIC SURGERY

## 2025-06-25 PROCEDURE — 6360000002 HC RX W HCPCS: Performed by: ORTHOPAEDIC SURGERY

## 2025-06-25 PROCEDURE — 6360000002 HC RX W HCPCS: Performed by: ANESTHESIOLOGY

## 2025-06-25 PROCEDURE — 2500000003 HC RX 250 WO HCPCS: Performed by: NURSE ANESTHETIST, CERTIFIED REGISTERED

## 2025-06-25 PROCEDURE — 3700000001 HC ADD 15 MINUTES (ANESTHESIA): Performed by: ORTHOPAEDIC SURGERY

## 2025-06-25 PROCEDURE — 80048 BASIC METABOLIC PNL TOTAL CA: CPT

## 2025-06-25 PROCEDURE — 2709999900 HC NON-CHARGEABLE SUPPLY: Performed by: ORTHOPAEDIC SURGERY

## 2025-06-25 PROCEDURE — 6360000002 HC RX W HCPCS

## 2025-06-25 PROCEDURE — 2500000003 HC RX 250 WO HCPCS

## 2025-06-25 PROCEDURE — 3700000000 HC ANESTHESIA ATTENDED CARE: Performed by: ORTHOPAEDIC SURGERY

## 2025-06-25 PROCEDURE — 3600000014 HC SURGERY LEVEL 4 ADDTL 15MIN: Performed by: ORTHOPAEDIC SURGERY

## 2025-06-25 PROCEDURE — 6370000000 HC RX 637 (ALT 250 FOR IP): Performed by: ANESTHESIOLOGY

## 2025-06-25 PROCEDURE — 7100000000 HC PACU RECOVERY - FIRST 15 MIN: Performed by: ORTHOPAEDIC SURGERY

## 2025-06-25 PROCEDURE — 3600000004 HC SURGERY LEVEL 4 BASE: Performed by: ORTHOPAEDIC SURGERY

## 2025-06-25 PROCEDURE — 2580000003 HC RX 258

## 2025-06-25 RX ORDER — HYDROCODONE BITARTRATE AND ACETAMINOPHEN 5; 325 MG/1; MG/1
1 TABLET ORAL ONCE
Status: COMPLETED | OUTPATIENT
Start: 2025-06-25 | End: 2025-06-25

## 2025-06-25 RX ORDER — DEXMEDETOMIDINE HYDROCHLORIDE 100 UG/ML
INJECTION, SOLUTION INTRAVENOUS
Status: DISCONTINUED | OUTPATIENT
Start: 2025-06-25 | End: 2025-06-25 | Stop reason: SDUPTHER

## 2025-06-25 RX ORDER — SODIUM CHLORIDE 0.9 % (FLUSH) 0.9 %
5-40 SYRINGE (ML) INJECTION EVERY 12 HOURS SCHEDULED
Status: DISCONTINUED | OUTPATIENT
Start: 2025-06-25 | End: 2025-06-25 | Stop reason: HOSPADM

## 2025-06-25 RX ORDER — PHENYLEPHRINE HCL IN 0.9% NACL 1 MG/10 ML
SYRINGE (ML) INTRAVENOUS
Status: DISCONTINUED | OUTPATIENT
Start: 2025-06-25 | End: 2025-06-25 | Stop reason: SDUPTHER

## 2025-06-25 RX ORDER — PROCHLORPERAZINE EDISYLATE 5 MG/ML
5 INJECTION INTRAMUSCULAR; INTRAVENOUS
Status: DISCONTINUED | OUTPATIENT
Start: 2025-06-25 | End: 2025-06-25 | Stop reason: HOSPADM

## 2025-06-25 RX ORDER — NALOXONE HYDROCHLORIDE 0.4 MG/ML
INJECTION, SOLUTION INTRAMUSCULAR; INTRAVENOUS; SUBCUTANEOUS PRN
Status: DISCONTINUED | OUTPATIENT
Start: 2025-06-25 | End: 2025-06-25 | Stop reason: HOSPADM

## 2025-06-25 RX ORDER — SODIUM CHLORIDE 9 MG/ML
INJECTION, SOLUTION INTRAVENOUS
Status: DISCONTINUED | OUTPATIENT
Start: 2025-06-25 | End: 2025-06-25 | Stop reason: SDUPTHER

## 2025-06-25 RX ORDER — PROPOFOL 10 MG/ML
INJECTION, EMULSION INTRAVENOUS
Status: DISCONTINUED | OUTPATIENT
Start: 2025-06-25 | End: 2025-06-25 | Stop reason: SDUPTHER

## 2025-06-25 RX ORDER — ROCURONIUM BROMIDE 10 MG/ML
INJECTION, SOLUTION INTRAVENOUS
Status: DISCONTINUED | OUTPATIENT
Start: 2025-06-25 | End: 2025-06-25 | Stop reason: SDUPTHER

## 2025-06-25 RX ORDER — LIDOCAINE HYDROCHLORIDE 20 MG/ML
INJECTION, SOLUTION EPIDURAL; INFILTRATION; INTRACAUDAL; PERINEURAL
Status: DISCONTINUED | OUTPATIENT
Start: 2025-06-25 | End: 2025-06-25 | Stop reason: SDUPTHER

## 2025-06-25 RX ORDER — FENTANYL CITRATE 50 UG/ML
INJECTION, SOLUTION INTRAMUSCULAR; INTRAVENOUS
Status: DISCONTINUED | OUTPATIENT
Start: 2025-06-25 | End: 2025-06-25 | Stop reason: SDUPTHER

## 2025-06-25 RX ORDER — SODIUM CHLORIDE 0.9 % (FLUSH) 0.9 %
5-40 SYRINGE (ML) INJECTION PRN
Status: DISCONTINUED | OUTPATIENT
Start: 2025-06-25 | End: 2025-06-25 | Stop reason: HOSPADM

## 2025-06-25 RX ORDER — SODIUM CHLORIDE 9 MG/ML
INJECTION, SOLUTION INTRAVENOUS PRN
Status: DISCONTINUED | OUTPATIENT
Start: 2025-06-25 | End: 2025-06-25 | Stop reason: HOSPADM

## 2025-06-25 RX ORDER — SUCCINYLCHOLINE/SOD CL,ISO/PF 200MG/10ML
SYRINGE (ML) INTRAVENOUS
Status: DISCONTINUED | OUTPATIENT
Start: 2025-06-25 | End: 2025-06-25 | Stop reason: SDUPTHER

## 2025-06-25 RX ORDER — BUPIVACAINE HYDROCHLORIDE 5 MG/ML
INJECTION, SOLUTION EPIDURAL; INTRACAUDAL; PERINEURAL PRN
Status: DISCONTINUED | OUTPATIENT
Start: 2025-06-25 | End: 2025-06-25 | Stop reason: ALTCHOICE

## 2025-06-25 RX ORDER — ONDANSETRON 2 MG/ML
INJECTION INTRAMUSCULAR; INTRAVENOUS
Status: DISCONTINUED | OUTPATIENT
Start: 2025-06-25 | End: 2025-06-25 | Stop reason: SDUPTHER

## 2025-06-25 RX ORDER — DEXAMETHASONE SODIUM PHOSPHATE 4 MG/ML
INJECTION, SOLUTION INTRA-ARTICULAR; INTRALESIONAL; INTRAMUSCULAR; INTRAVENOUS; SOFT TISSUE
Status: DISCONTINUED | OUTPATIENT
Start: 2025-06-25 | End: 2025-06-25 | Stop reason: SDUPTHER

## 2025-06-25 RX ADMIN — HYDROMORPHONE HYDROCHLORIDE 0.5 MG: 1 INJECTION, SOLUTION INTRAMUSCULAR; INTRAVENOUS; SUBCUTANEOUS at 16:16

## 2025-06-25 RX ADMIN — HYDROMORPHONE HYDROCHLORIDE 0.5 MG: 1 INJECTION, SOLUTION INTRAMUSCULAR; INTRAVENOUS; SUBCUTANEOUS at 15:54

## 2025-06-25 RX ADMIN — ONDANSETRON 4 MG: 2 INJECTION, SOLUTION INTRAMUSCULAR; INTRAVENOUS at 12:53

## 2025-06-25 RX ADMIN — FENTANYL CITRATE 25 MCG: 50 INJECTION, SOLUTION INTRAMUSCULAR; INTRAVENOUS at 14:46

## 2025-06-25 RX ADMIN — FENTANYL CITRATE 50 MCG: 50 INJECTION, SOLUTION INTRAMUSCULAR; INTRAVENOUS at 12:53

## 2025-06-25 RX ADMIN — FENTANYL CITRATE 50 MCG: 50 INJECTION, SOLUTION INTRAMUSCULAR; INTRAVENOUS at 13:28

## 2025-06-25 RX ADMIN — FENTANYL CITRATE 25 MCG: 50 INJECTION, SOLUTION INTRAMUSCULAR; INTRAVENOUS at 14:11

## 2025-06-25 RX ADMIN — LIDOCAINE HYDROCHLORIDE 100 MG: 20 INJECTION, SOLUTION EPIDURAL; INFILTRATION; INTRACAUDAL; PERINEURAL at 12:53

## 2025-06-25 RX ADMIN — PROPOFOL 200 MG: 10 INJECTION, EMULSION INTRAVENOUS at 12:53

## 2025-06-25 RX ADMIN — DEXAMETHASONE SODIUM PHOSPHATE 8 MG: 4 INJECTION, SOLUTION INTRAMUSCULAR; INTRAVENOUS at 12:53

## 2025-06-25 RX ADMIN — HYDROCODONE BITARTRATE AND ACETAMINOPHEN 1 TABLET: 5; 325 TABLET ORAL at 16:52

## 2025-06-25 RX ADMIN — ROCURONIUM BROMIDE 25 MG: 10 INJECTION, SOLUTION INTRAVENOUS at 13:42

## 2025-06-25 RX ADMIN — SUGAMMADEX 200 MG: 100 INJECTION, SOLUTION INTRAVENOUS at 15:12

## 2025-06-25 RX ADMIN — SODIUM CHLORIDE: 9 INJECTION, SOLUTION INTRAVENOUS at 12:46

## 2025-06-25 RX ADMIN — DEXMEDETOMIDINE HCL 4 MCG: 100 INJECTION INTRAVENOUS at 13:38

## 2025-06-25 RX ADMIN — FENTANYL CITRATE 50 MCG: 50 INJECTION, SOLUTION INTRAMUSCULAR; INTRAVENOUS at 12:46

## 2025-06-25 RX ADMIN — FENTANYL CITRATE 25 MCG: 50 INJECTION, SOLUTION INTRAMUSCULAR; INTRAVENOUS at 14:56

## 2025-06-25 RX ADMIN — Medication 120 MG: at 13:00

## 2025-06-25 RX ADMIN — SODIUM CHLORIDE: 9 INJECTION, SOLUTION INTRAVENOUS at 14:40

## 2025-06-25 RX ADMIN — HYDROMORPHONE HYDROCHLORIDE 0.5 MG: 1 INJECTION, SOLUTION INTRAMUSCULAR; INTRAVENOUS; SUBCUTANEOUS at 16:32

## 2025-06-25 RX ADMIN — Medication 50 MCG: at 13:17

## 2025-06-25 RX ADMIN — FENTANYL CITRATE 25 MCG: 50 INJECTION, SOLUTION INTRAMUSCULAR; INTRAVENOUS at 15:06

## 2025-06-25 RX ADMIN — HYDROMORPHONE HYDROCHLORIDE 0.5 MG: 1 INJECTION, SOLUTION INTRAMUSCULAR; INTRAVENOUS; SUBCUTANEOUS at 15:34

## 2025-06-25 RX ADMIN — DEXMEDETOMIDINE HCL 4 MCG: 100 INJECTION INTRAVENOUS at 13:31

## 2025-06-25 RX ADMIN — WATER 3000 MG: 1 INJECTION INTRAMUSCULAR; INTRAVENOUS; SUBCUTANEOUS at 12:53

## 2025-06-25 RX ADMIN — Medication 50 MCG: at 13:21

## 2025-06-25 ASSESSMENT — PAIN DESCRIPTION - ORIENTATION
ORIENTATION: LEFT

## 2025-06-25 ASSESSMENT — PAIN SCALES - GENERAL
PAINLEVEL_OUTOF10: 5
PAINLEVEL_OUTOF10: 7
PAINLEVEL_OUTOF10: 8
PAINLEVEL_OUTOF10: 8
PAINLEVEL_OUTOF10: 7

## 2025-06-25 ASSESSMENT — PAIN DESCRIPTION - PAIN TYPE
TYPE: SURGICAL PAIN

## 2025-06-25 ASSESSMENT — PAIN DESCRIPTION - LOCATION
LOCATION: WRIST

## 2025-06-25 ASSESSMENT — PAIN DESCRIPTION - DESCRIPTORS
DESCRIPTORS: SORE

## 2025-06-25 ASSESSMENT — PAIN - FUNCTIONAL ASSESSMENT: PAIN_FUNCTIONAL_ASSESSMENT: NONE - DENIES PAIN

## 2025-06-25 NOTE — H&P
Department of Orthopedic Surgery  Attending History and Physical        CHIEF COMPLAINT:  pain    Reason for Admission:  OR    History Obtained From:  patient    HISTORY OF PRESENT ILLNESS:      The patient is a 74 y.o. female with significant past medical history of pain at left thumb who presents with history of failing conservative measures    Past Medical History:        Diagnosis Date    Anxiety     Basal cell carcinoma (BCC) of right shoulder     Chronic back pain     Closed avulsion fracture of distal fibula with delayed healing, left     COVID-19     Degenerative joint disease involving multiple joints     Difficult intubation 12/16/2024    Patient reports was not samuel to be intubated for a shoulder surgery at [facility] office during the last year - spoke to Soraida at Dr Camacho's office - requested the anesthesia records be faxed  to [facility] office    Diverticulitis     Hyperlipidemia     Hypertension     Essential hypertension    Liver disease     LLL pneumonia     Obesity     Osteoarthritis     Palpitations 09/17/2012    Thumb pain     left    Tricuspid regurgitation     Trochanteric bursitis of right hip     Varicose veins of both lower extremities      Past Surgical History:        Procedure Laterality Date    BREAST SURGERY  1990    cyst lt breast    CARDIAC CATHETERIZATION  06/19/2019    dr garcía    CHOLECYSTECTOMY      COLONOSCOPY  5/2012    HYSTERECTOMY (CERVIX STATUS UNKNOWN)  1980    JOINT REPLACEMENT  2001    L knee    JOINT REPLACEMENT  2001    rt knee    TOTAL KNEE ARTHROPLASTY Left 2/22/2021    LEFT KNEE TOTAL ARTHROPLASTY REVISION performed by Barber Fung MD at [facility] [place]     Immunizations:              Influenza:  Up-to-date            Pneumococcal Polysaccharide:  Up-to-date; approximate date:     Medications Prior to Admission:   Medications Prior to Admission: buPROPion (WELLBUTRIN XL) 300 MG extended release tablet, Take 1 tablet by mouth every morning  losartan

## 2025-06-25 NOTE — DISCHARGE INSTRUCTIONS
F/U with me in 7-10 days    Call [phone] for appointment   Elevate extremity  Keep dressing clean and dry  Do not remove dressing

## 2025-06-25 NOTE — OP NOTE
[place] [address]                            OPERATIVE REPORT      PATIENT NAME: DILIA TENORIO         : 1950  MED REC NO: [id number]                        ROOM: [facility]  ACCOUNT NO: [id number]                       ADMIT DATE: 2025  PROVIDER: Arabella Camacho MD      DATE OF PROCEDURE:  2025    SURGEON:  Arabella Camacho MD    PREOPERATIVE DIAGNOSIS:  Osteoarthritis, left thumb base.    POSTOPERATIVE DIAGNOSIS:  Osteoarthritis, left thumb base.    PROCEDURE TITLE:  Left thumb ligament reconstruction tendon interposition arthroplasty, excision of left trapezium, left wrist flexor tendon transfer.    ASSISTANT:  None.    ANESTHESIA:  General.    COMPLICATIONS:  None.    TOURNIQUET TIME:  95 minutes.    ESTIMATED BLOOD LOSS:  Minimal.    INDICATIONS:  This is a 74-year-old female with significant osteoarthritis of the base of the left thumb, has failed conservative measures; and after lengthy discussion of risks, benefits, treatment alternatives, agreed to operative intervention.    OPERATIVE FINDINGS:  Include end-stage CMC osteoarthritis.  Stat satisfactory excision of trapezium and reconstruction with FCR tendon.    DESCRIPTION OF PROCEDURE:  The patient was brought to the OR and placed in supine position.  After adequate general anesthetic, a tourniquet was applied to the upper arm.  The arm was prepped and draped in sterile manner.  The arm was exsanguinated with Esmarch, and tourniquet inflated to 280 mmHg.  A curvilinear and Prince incision was created at the base of the thumb longitudinally along the dorsal radial aspect of the metacarpal and then curving transversely across the distal wrist crease, carried down through skin and subcutaneous fat.  Traversing vessels and superficial sensory nerves identified and protected.  A longitudinal incision was created between the extensor

## 2025-06-25 NOTE — ANESTHESIA POSTPROCEDURE EVALUATION
Department of Anesthesiology  Postprocedure Note    Patient: Anisha Bustamante  MRN: [id number]  YOB: 1950  Date of evaluation: 6/25/2025    Procedure Summary       Date: 06/25/25 Room / Location: [address]    Anesthesia Start: 1246 Anesthesia Stop: 1525    Procedure: LEFT THUMB LIGAMENT RECONSTRUCTION TENDON INTERPOSITION ARTHROPLASTY EXCISION TRAPEZIUM LEFT WRIST FLEXOR TENDON TRANSFER (Left: Arm Lower) Diagnosis:       Osteoarthritis of thumb, left      (Osteoarthritis of thumb, left [M18.12])    Surgeons: Arabella Camacho MD Responsible Provider: Magy Farias MD    Anesthesia Type: General ASA Status: 3            Anesthesia Type: General    Lazarus Phase I: Lazarus Score: 8    Lazarus Phase II:      Anesthesia Post Evaluation    Patient location during evaluation: PACU  Patient participation: complete - patient participated  Level of consciousness: awake and alert  Pain score: 0  Airway patency: patent  Nausea & Vomiting: no vomiting and no nausea  Cardiovascular status: hemodynamically stable  Respiratory status: spontaneous ventilation, nonlabored ventilation and acceptable  Hydration status: stable  Pain management: adequate        No notable events documented.

## 2025-06-25 NOTE — PROGRESS NOTES
Pt is much more comfortable with her pain control. Pt is now on O2 and we are weening her off. Pt's family at bedside. Pt is alert and oriented talking with family and has snacks.

## 2025-06-25 NOTE — BRIEF OP NOTE
Brief Postoperative Note      Patient: Anisha Bustamante  YOB: 1950  MRN: [id number]    Date of Procedure: 6/25/2025    Pre-Op Diagnosis Codes:      * Osteoarthritis of thumb, left [M18.12]    Post-Op Diagnosis: Same       Procedure(s):  LEFT THUMB LIGAMENT RECONSTRUCTION TENDON INTERPOSITION ARTHROPLASTY EXCISION TRAPEZIUM LEFT WRIST FLEXOR TENDON TRANSFER    Surgeon(s):  Arabella Camacho MD    Assistant:  Surgical Assistant: Enid Serrano    Anesthesia: General    Estimated Blood Loss (mL): Minimal    Complications: None    Specimens:   * No specimens in log *    Implants:  * No implants in log *      Drains: * No LDAs found *    Findings:  Infection Present At Time Of Surgery (PATOS) (choose all levels that have infection present):  No infection present  Other Findings: excellent reconstruction of thumb base    Electronically signed by Arabella Camacho MD on 6/25/2025 at 3:24 PM

## (undated) DEVICE — CLOTH SURG PREP PREOPERATIVE CHLORHEXIDINE GLUC 2% READYPREP

## (undated) DEVICE — DRILL SYSTEM 7

## (undated) DEVICE — SYRINGE MED 50ML LUERLOCK TIP

## (undated) DEVICE — PATIENT RETURN ELECTRODE, SINGLE-USE, CONTACT QUALITY MONITORING, ADULT, WITH 9FT CORD, FOR PATIENTS WEIGING OVER 33LBS. (15KG): Brand: MEGADYNE

## (undated) DEVICE — BANDAGE,GAUZE,CONFORMING,3"X75",STRL,LF: Brand: MEDLINE

## (undated) DEVICE — GLOVE ORANGE PI 7   MSG9070

## (undated) DEVICE — INTENDED FOR TISSUE SEPARATION, AND OTHER PROCEDURES THAT REQUIRE A SHARP SURGICAL BLADE TO PUNCTURE OR CUT.: Brand: BARD-PARKER ® STAINLESS STEEL BLADES

## (undated) DEVICE — DRAPE,HAND,STERILE: Brand: MEDLINE

## (undated) DEVICE — SYRINGE 20ML LL S/C 50

## (undated) DEVICE — BLADE OPHTH GRN ROUNDED TIP 1 SIDE SHRP GRINDLESS MINI-BLDE

## (undated) DEVICE — SPLINT KNEE UNIV FOR LESS THAN 36IN L24IN FOAM LAM E CNTCT

## (undated) DEVICE — PADDING CAST W6INXL4YD COT LO LINTING WYTEX

## (undated) DEVICE — MATERIAL CAST W3INXL125FT 8 15MMHG BLK SUPP SEAMLESS ULT SHR

## (undated) DEVICE — SKIN PREP TRAY 4 COMPARTM TRAY: Brand: MEDLINE INDUSTRIES, INC.

## (undated) DEVICE — SOLUTION IV IRRIG WATER 1000ML POUR BRL 2F7114

## (undated) DEVICE — SURGICAL PROCEDURE PACK BASIC

## (undated) DEVICE — GOWN,AURORA,BRTHSLV,2XL,18/CS: Brand: MEDLINE

## (undated) DEVICE — Device

## (undated) DEVICE — SET ORTHO STD STORTSTD1

## (undated) DEVICE — ZIMMER® STERILE DISPOSABLE TOURNIQUET CUFF WITH PROTECTIVE SLEEVE AND PLC, DUAL PORT, SINGLE BLADDER, 34 IN. (86 CM)

## (undated) DEVICE — 3M™ COBAN™ NL STERILE NON-LATEX SELF-ADHERENT WRAP, 2084S, 4 IN X 5 YD (10 CM X 4,5 M), 18 ROLLS/CASE: Brand: 3M™ COBAN™

## (undated) DEVICE — BANDAGE,ELASTIC,ESMARK,STERILE,4"X9',LF: Brand: MEDLINE

## (undated) DEVICE — SIGNATURE SET

## (undated) DEVICE — DRESSING PETRO W3XL8IN OIL EMUL N ADH GZ KNIT IMPREG CELOS

## (undated) DEVICE — Z DISCONTINUED USE 2275686 GLOVE SURG SZ 8 L12IN FNGR THK13MIL WHT ISOLEX POLYISOPRENE

## (undated) DEVICE — APPLICATOR PREP 26ML 0.7% IOD POVACRYLEX 74% ISO ALC ST

## (undated) DEVICE — BOWL AND CEMENT CARTRIDGE WITH BREAKAWAY FEMORAL NOZZLE: Brand: ACM

## (undated) DEVICE — ZIMMER® STERILE DISPOSABLE TOURNIQUET CUFF WITH PLC, DUAL PORT, SINGLE BLADDER, 18 IN. (46 CM)

## (undated) DEVICE — GOWN,BREATHABLE,IMP SLV 3XL AURORA: Brand: MEDLINE

## (undated) DEVICE — BLADE,STAINLESS-STEEL,11,STRL,DISPOSABLE: Brand: MEDLINE

## (undated) DEVICE — GLOVE SURG SZ 8 L12IN FNGR THK79MIL GRN LTX FREE

## (undated) DEVICE — HANDPIECE SET WITH BONE CLEANING TIP AND SUCTION TUBE: Brand: INTERPULSE

## (undated) DEVICE — TOTAL KNEE PK

## (undated) DEVICE — Z INACTIVE USE 2660664 SOLUTION IRRIG 3000ML 0.9% SOD CHL USP UROMATIC PLAS CONT

## (undated) DEVICE — SHEET,DRAPE,53X77,STERILE: Brand: MEDLINE

## (undated) DEVICE — MARKER,SKIN,WI/RULER AND LABELS: Brand: MEDLINE

## (undated) DEVICE — GAUZE,SPONGE,4"X4",16PLY,XRAY,STRL,LF: Brand: MEDLINE

## (undated) DEVICE — TRAP,MUCUS SPECIMEN,40CC: Brand: MEDLINE

## (undated) DEVICE — GAUZE,SPONGE,4"X4",8PLY,STRL,LF,10/TRAY: Brand: MEDLINE

## (undated) DEVICE — PEEL-AWAY HOOD: Brand: FLYTE, SURGICOOL

## (undated) DEVICE — STRYKER PERFORMANCE SERIES SAGITTAL BLADE: Brand: STRYKER PERFORMANCE SERIES

## (undated) DEVICE — ZIMMER® STERILE DISPOSABLE TOURNIQUET CUFF WITH PLC, DUAL PORT, SINGLE BLADDER, 24 IN. (61 CM)

## (undated) DEVICE — BLADE CLIPPER GEN PURP NS

## (undated) DEVICE — SET TRIATHALON KNEE SZ 3-6 FEM/TIB TRIAL

## (undated) DEVICE — DRIP REDUCTION MANIFOLD

## (undated) DEVICE — SET TRIATHALON KNEE SZ 3-6 FEM/TIB PREP

## (undated) DEVICE — BANDAGE COMPR W4INXL10YD WHITE/BEIGE E MTRX HK LOOP CLSR

## (undated) DEVICE — 3M™ STERI-DRAPE™ U-DRAPE 1015: Brand: STERI-DRAPE™

## (undated) DEVICE — SYSTEM TPS ORTHO

## (undated) DEVICE — PIN FIX L3.5IN DIA1/8IN LNG HDLSS FOR MIS KNEE JT REPL

## (undated) DEVICE — SET STRYKER GLUE GUN

## (undated) DEVICE — SET ORTHO STD STORTSTD2

## (undated) DEVICE — BLADE,STAINLESS-STEEL,15,STRL,DISPOSABLE: Brand: MEDLINE

## (undated) DEVICE — PRECISION THIN (9.0 X 0.38 X 31.0MM)

## (undated) DEVICE — PICO 7 10CM X 30CM: Brand: PICO™ 7

## (undated) DEVICE — NEEDLE HYPO 21GA L1.5IN GRN POLYPR HUB S STL REG BVL STR

## (undated) DEVICE — GOWN,SIRUS,FABRNF,XL,20/CS: Brand: MEDLINE

## (undated) DEVICE — PRECISION THIN, OFFSET (5.5 X 0.38 X 25.0MM)

## (undated) DEVICE — BASIC SINGLE BASIN 1-LF: Brand: MEDLINE INDUSTRIES, INC.

## (undated) DEVICE — BANDAGE COMPR W6INXL12FT SMOOTH FOR LIMB EXSANG ESMARCH

## (undated) DEVICE — 2108 SERIES SAGITTAL BLADE FAN, OFFSET  (34.5 X 0.8 X 64.0MM)

## (undated) DEVICE — SET TRIATHALON MISC INSTR TRAY

## (undated) DEVICE — SET TRIATH PATELLA PREP TRIAL TRAY

## (undated) DEVICE — 3M™ IOBAN™ 2 ANTIMICROBIAL INCISE DRAPE 6650EZ: Brand: IOBAN™ 2

## (undated) DEVICE — SOLUTION IV IRRIG POUR BRL 0.9% SODIUM CHL 2F7124

## (undated) DEVICE — GLOVE SURG SZ 8 L11.77IN FNGR THK9.8MIL STRW LTX POLYMER

## (undated) DEVICE — ELECTRODE PT RET AD L9FT HI MOIST COND ADH HYDRGEL CORDED

## (undated) DEVICE — 4-PORT MANIFOLD: Brand: NEPTUNE 2

## (undated) DEVICE — NEEDLE HYPO 18GA L1.5IN PNK POLYPR HUB S STL REG BVL STR

## (undated) DEVICE — GOWN,BREATHABLE SLV,AURORA,XLG,STRL: Brand: MEDLINE

## (undated) DEVICE — TOWEL,OR,DSP,ST,BLUE,STD,6/PK,12PK/CS: Brand: MEDLINE